# Patient Record
Sex: FEMALE | Race: WHITE | NOT HISPANIC OR LATINO | Employment: OTHER | URBAN - METROPOLITAN AREA
[De-identification: names, ages, dates, MRNs, and addresses within clinical notes are randomized per-mention and may not be internally consistent; named-entity substitution may affect disease eponyms.]

---

## 2017-01-17 ENCOUNTER — LAB VISIT (OUTPATIENT)
Dept: LAB | Facility: HOSPITAL | Age: 72
End: 2017-01-17
Attending: INTERNAL MEDICINE
Payer: MEDICARE

## 2017-01-17 DIAGNOSIS — E78.5 DYSLIPIDEMIA: ICD-10-CM

## 2017-01-17 LAB
CHOLEST/HDLC SERPL: 4.8 {RATIO}
HDL/CHOLESTEROL RATIO: 20.6 %
HDLC SERPL-MCNC: 218 MG/DL
HDLC SERPL-MCNC: 45 MG/DL
LDLC SERPL CALC-MCNC: 116.6 MG/DL
NONHDLC SERPL-MCNC: 173 MG/DL
TRIGL SERPL-MCNC: 282 MG/DL

## 2017-01-17 PROCEDURE — 80061 LIPID PANEL: CPT

## 2017-01-17 PROCEDURE — 36415 COLL VENOUS BLD VENIPUNCTURE: CPT | Mod: PO

## 2017-01-25 ENCOUNTER — OFFICE VISIT (OUTPATIENT)
Dept: NEUROLOGY | Facility: CLINIC | Age: 72
End: 2017-01-25
Payer: MEDICARE

## 2017-01-25 VITALS
SYSTOLIC BLOOD PRESSURE: 151 MMHG | HEIGHT: 65 IN | BODY MASS INDEX: 25.56 KG/M2 | WEIGHT: 153.44 LBS | DIASTOLIC BLOOD PRESSURE: 66 MMHG | HEART RATE: 68 BPM

## 2017-01-25 DIAGNOSIS — R51.9 OCCIPITAL PAIN: Primary | ICD-10-CM

## 2017-01-25 DIAGNOSIS — Z86.59 HISTORY OF ANXIETY: ICD-10-CM

## 2017-01-25 DIAGNOSIS — R03.0 TRANSIENT ELEVATED BLOOD PRESSURE: ICD-10-CM

## 2017-01-25 PROCEDURE — 99214 OFFICE O/P EST MOD 30 MIN: CPT | Mod: PBBFAC | Performed by: PHYSICIAN ASSISTANT

## 2017-01-25 PROCEDURE — 99999 PR PBB SHADOW E&M-EST. PATIENT-LVL IV: CPT | Mod: PBBFAC,,, | Performed by: PHYSICIAN ASSISTANT

## 2017-01-25 PROCEDURE — 99213 OFFICE O/P EST LOW 20 MIN: CPT | Mod: S$PBB,,, | Performed by: PHYSICIAN ASSISTANT

## 2017-01-25 RX ORDER — CYCLOBENZAPRINE HCL 10 MG
TABLET ORAL
Refills: 1 | COMMUNITY
Start: 2016-11-18 | End: 2017-02-07

## 2017-01-25 RX ORDER — PROPRANOLOL HYDROCHLORIDE 10 MG/1
TABLET ORAL
Qty: 60 TABLET | Refills: 3 | Status: SHIPPED | OUTPATIENT
Start: 2017-01-25 | End: 2017-11-16 | Stop reason: ALTCHOICE

## 2017-01-25 NOTE — PROGRESS NOTES
"[facility], Department of Neurology   [facility] [place] [address]  Phone:[phone]  Fax: [phone]      Established Patient       Patient Name: Elizabeth Gleason  : 1945  MRN:  [id number]    2017  Hector Oviedo MPA, PA-C    PCP:  COLEMAN Reyes MD  Date of Last Visit: 2016    CC: Follow-up      IMPRESSION:       ICD-10-CM ICD-9-CM   1. Occipital pain R51 784.0   2. History of anxiety Z86.59 V11.8   3. Transient elevated blood pressure R03.0 796.2   4. Hx of DM and osteoporosis (will not write steroids)   ---------in addition to above--------  Was doing well until recently, back under stress (flat tired, moved, etc). Comes and goes.   PLAN:   Will have her restart inderal daily, at noon (most of her "HA" occur mid day), she can try PRN in am or evening.     Reassured her that her exam was unchanged and essentially normal, if concern/changes, I am happy to get updated imaging, if the above treatment fails    She never got the PT I ordered at last visit, could consider another order for this in the future.     Follow up with PCP for other issues (BP, DM, Thyroid).     Review:   Orders Placed This Encounter    propranolol (INDERAL) 10 MG tablet     No orders of the defined types were placed in this encounter.      The patient is to continue to follow with the PCP for all other health conditions.    The patient indicates understanding of these issues and agrees to the plan    All current medications, test results as well as any necessary instructions were discussed with Elizabeth Gleason. Side effects of medications were explained. The patient indicates understanding of these issues and agrees to the plan and/or medication (s) discussed.      Follow Up PRN, I will be available for a while longer but will be departing the practice late Spring. My colleagues will be available if she needs to be re-examined. She voiced agreement.   INTERVAL HISTORY:  " "    History of Present Illness:  Elizabeth Gleason is a 71 y.o. right handed, female. She presents alone for HA. She  states:    States HA have come and gone. "When I take the inderal, I feel better, then I stop it, then I get a headache. I then take my BP, and its high, and that makes me nervous, and the headache gets worse, then my BP goes up..."   States daily  HA for past week, L occipitalis, no vision loss, weakness, sensory changes, falls, confusion, eye pain. Pain lasts a few hours. "I am sure its the anxiety, but I want to make sure nothing else is wrong." When asked, "if you were on the medicine on a regular basis, would you have the headaches (referencing the inderal)?" she replied "well, no."     Never got the PT ordered at last visit, "when I took the medicine, I was fine." States she is out of inderal.     As an aside, states her BP has been mildly elevated, her FBS has been "okay" during these episodes, and will be following up with her PCP in the near future.     States she is 5/10 pain in office, though smiling, lights on, doesn't appear in distress.     Imaging on file: no recent brain imaging   Therapies tried in past:  excedrin  advil   Aleve  Flexeril   Inderal     Medication reviewed and documented below:  Current Outpatient Prescriptions   Medication Sig Dispense Refill    ascorbic acid (VITAMIN C) 250 MG tablet Take 250 mg by mouth once daily.      aspirin (ECOTRIN) 81 MG EC tablet Take 81 mg by mouth once daily.      blood sugar diagnostic Strp 1 freestyle lite test strip weekly 50 strip 11    calcium-vitamin D 500-125 mg-unit tablet Take 1 tablet by mouth 2 (two) times daily.        colestipol (COLESTID) 1 gram Tab Take 1 tablet (1 g total) by mouth 2 (two) times daily. 180 tablet 3    cranberry 400 mg Cap Take by mouth.      cyclobenzaprine (FLEXERIL) 10 MG tablet TAKE 1 TABLET BY MOUTH 3 (THREE) TIMES DAILY AS NEEDED FOR MUSCLE SPASMS.  1    diazePAM (VALIUM) 5 MG " tablet Take 1 tablet (5 mg total) by mouth every 12 (twelve) hours as needed. 60 tablet 1    fexofenadine (ALLEGRA) 180 MG tablet Take 1 tablet (180 mg total) by mouth once daily. 90 tablet 3    fish oil-omega-3 fatty acids 300-1,000 mg capsule Take 2 g by mouth once daily.      fluticasone (FLONASE) 50 mcg/actuation nasal spray 2 sprays by Each Nare route once daily. 16 g 3    hydrocortisone (ANUSOL-HC) 25 mg suppository PLACE 1 SUPPOSITORY (25 MG TOTAL) RECTALLY 2 (TWO) TIMES DAILY AS NEEDED FOR HEMORRHOIDS. 24 suppository 0    levothyroxine (SYNTHROID) 88 MCG tablet Take 1 tablet (88 mcg total) by mouth once daily. 90 tablet 3    lisinopril-hydrochlorothiazide (PRINZIDE,ZESTORETIC) 20-12.5 mg per tablet Take 1 tablet by mouth once daily. 90 tablet 3    neomycin-polymyxin-hydrocortisone (CORTISPORIN) otic solution INSTILL 4 DROPS FOUR TIMES DAILY IN AFFECTED EAR 10 mL 1    omeprazole (PRILOSEC) 20 MG capsule Take 1 capsule (20 mg total) by mouth once daily. 90 capsule 3    ondansetron (ZOFRAN) 4 MG tablet Take 4 mg by mouth. 1-2 po q 4 hrs prn nausea.      promethazine (PHENERGAN) 25 MG tablet Take 1 tablet (25 mg total) by mouth every 6 (six) hours as needed for Nausea. 20 tablet 1    propranolol (INDERAL) 10 MG tablet 1 pill up to 3x a day as needed for stress/headache 60 tablet 3    rosuvastatin (CRESTOR) 5 MG tablet Take 1 tablet (5 mg total) by mouth once daily. 1 po qod 90 tablet 3    UBIDECARENONE (CO Q-10 ORAL) Take by mouth.      amoxicillin (AMOXIL) 500 MG capsule Take 500 mg by mouth 3 (three) times daily.  0     No current facility-administered medications for this visit.      Review of patient's allergies indicates:   Allergen Reactions    Levaquin [levofloxacin]      Heart raced and felt faint    Tramadol Nausea And Vomiting       PMHx: hearing loss Right ear  Past Medical History   Diagnosis Date    ALLERGIC RHINITIS     Carotid artery narrowing      rt worse than the left     "HEARING LOSS     Hyperlipidemia     Hypertension     Hypothyroid     Impaired fasting glucose     Menopause present     Migraine headache     Osteopenia     Tuberculosis      childhood TB       Fhx:  Family History   Problem Relation Age of Onset    Heart disease Mother      cabg    Kidney disease Mother      was on dialysis    Stroke Father     Cancer Brother      lung cancer    Breast cancer Neg Hx     Ovarian cancer Neg Hx     Colon cancer Maternal Aunt      SHx:no tobacco, occasional glass of wine, 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          Since Last visit, no further changes in PMHx, FHx, SHx if not already listed above.      Review of Testing:  Reviewed in chart, nothing to add to today's visit     ROS:   Review Of Systems (questions asked, positive or additions in BOLD)  Gen: Weakness    HEENT: Tinnitus, headache, blurred vision, eye pain, diplopia, photophobia,  nose bleeds   Card: Palpitations, CP   Pulm: SOB, cough         GI: N/V/D/C, incontinence, hematemesis, hematochezia    : incontinence, hematuria         M/S: Neck pain, falls    Neuro: PER HPI   PSY: Memory loss, confusion, depression, anxiety, trouble in sleep             PHYSICAL EXAM:      Visit Vitals    BP (!) 151/66    Pulse 68    Ht 5' 5" (1.651 m)    Wt 69.6 kg (153 lb 7 oz)    BMI 25.53 kg/m2      GEN: NAD, lights on in exam room   HEENT: NC/AT, " frontalis/temporalis/occipitalis/trapzeius NTTP  CVS: RRR, no MRG  MS: kyphotic at baseline     NEURO:  Mental Status: Awake, alert and appropriately oriented to time, place, and person. Speech is fluent and appropriate language function. Anxious, wringing hands during visit, has to redirected at times.      Cranial Nerves: Extraocular movements are intact and without nystagmus. Visual fields are full to confrontation testing Facial movement is symmetric. Facial sensation is intact. Hearing is normal. Uvula in Shoulder shrug symmetrical. T      Motor:   RUE:appropriate against gravity and medium force as tested 5/5  LUE: appropriate against gravity and medium force as tested 5/5  RLE:appropriate against gravity and medium force as tested 5/5  LLE: appropriate against gravity and medium force as tested 5/5  No resting tremor      Sensory:  RUE  intact light touch, proprioception and temperature  LUE intact light touch, proprioception and temperature     RLE intact light touch  LLE intact light touch        DTR's: 2+ bilat patellar      Gait and Stance: sit to stand and ambulates without assistance     CC:  P Parth Reyes MD      This document has been electronically signed by Hector Oviedo MPA, PA-C on 1/25/2017, 11:51 AM.  I have personally typed this message using the EMR.      Attending, Dr. Kiesha MD was available during today's encounter. Any change to plan along with cosign to appear in the EMR.

## 2017-01-25 NOTE — LETTER
January 25, 2017      CHAPINCITO Reyes MD  [address] [facility] [place] [address]           [facility]  [address]  [place] [address]  Phone: [phone]  Fax: [phone]          Patient: Elizabeth Gleason   MR Number: [id number]   YOB: 1945   Date of Visit: 1/25/2017       Dear Dr. CHAPINCITO Reyes:    Thank you for referring Elizabeth Gleason to me for evaluation. Attached you will find relevant portions of my assessment and plan of care.    If you have questions, please do not hesitate to call me. I look forward to following Elizabeth Gleason along with you.    Sincerely,    Hector Oviedo PA-C    Enclosure  CC:  No Recipients    If you would like to receive this communication electronically, please contact [email or web address] or [phone] to request more information on [employer] Link access.    For providers and/or their staff who would like to refer a patient to [facility], please contact us through our one-stop-shop provider referral line, [facility], at [phone].    If you feel you have received this communication in error or would no longer like to receive these types of communications, please e-mail [email or web address]

## 2017-01-31 ENCOUNTER — OFFICE VISIT (OUTPATIENT)
Dept: INTERNAL MEDICINE | Facility: CLINIC | Age: 72
End: 2017-01-31
Payer: MEDICARE

## 2017-01-31 VITALS
DIASTOLIC BLOOD PRESSURE: 78 MMHG | RESPIRATION RATE: 16 BRPM | BODY MASS INDEX: 25.2 KG/M2 | TEMPERATURE: 98 F | HEART RATE: 60 BPM | HEIGHT: 65 IN | SYSTOLIC BLOOD PRESSURE: 140 MMHG | WEIGHT: 151.25 LBS

## 2017-01-31 DIAGNOSIS — I10 ESSENTIAL HYPERTENSION: Primary | ICD-10-CM

## 2017-01-31 DIAGNOSIS — E78.5 HYPERLIPIDEMIA, UNSPECIFIED HYPERLIPIDEMIA TYPE: ICD-10-CM

## 2017-01-31 DIAGNOSIS — K64.4 EXTERNAL HEMORRHOID: ICD-10-CM

## 2017-01-31 PROCEDURE — 99213 OFFICE O/P EST LOW 20 MIN: CPT | Mod: PBBFAC,PO | Performed by: INTERNAL MEDICINE

## 2017-01-31 PROCEDURE — 99214 OFFICE O/P EST MOD 30 MIN: CPT | Mod: S$PBB,,, | Performed by: INTERNAL MEDICINE

## 2017-01-31 PROCEDURE — 99999 PR PBB SHADOW E&M-EST. PATIENT-LVL III: CPT | Mod: PBBFAC,,, | Performed by: INTERNAL MEDICINE

## 2017-01-31 NOTE — MR AVS SNAPSHOT
[facility]   [facility]  [place] [address]  Phone: [phone]  Fax: [phone]                  Elizabeth Gleason   2017 11:40 AM   Office Visit    Description:  Female : 1945   Provider:  CHAPINCITO Reyes MD   Department:  [facility]           Reason for Visit     Follow-up           Diagnoses this Visit        Comments    Essential hypertension    -  Primary     External hemorrhoid         Hyperlipidemia, unspecified hyperlipidemia type                To Do List           Future Appointments        Provider Department Dept Phone    2017 1:15 PM Kristyn Castro MD [facility]/ -[phone]      Goals (5 Years of Data)     None      Follow-Up and Disposition     Return in about 6 months (around 2017).       These Medications        Disp Refills Start End    rosuvastatin (CRESTOR) 5 MG tablet 90 tablet 3 2017    Take 1 tablet (5 mg total) by mouth once daily. - Oral    Pharmacy: [facility] #[id number] - [place] - [address]  #: [phone]         [facility]     [facility] Nurse Care [facility] -  Assistance  Registered nurses in the [facility] provide clinical advisement, health education, appointment booking, and other advisory services.  Call for this free service at [phone].             Medications           Message regarding Medications     Verify the changes and/or additions to your medication regime listed below are the same as discussed with your clinician today.  If any of these changes or additions are incorrect, please notify your healthcare provider.        START taking these NEW medications        Refills    rosuvastatin (CRESTOR) 5 MG tablet 3    Sig: Take 1 tablet (5 mg total) by mouth once daily.    Class: No Print    Route: Oral           Verify that the below list of medications is an accurate representation of the medications you are currently  taking.  If none reported, the list may be blank. If incorrect, please contact your healthcare provider. Carry this list with you in case of emergency.           Current Medications     amoxicillin (AMOXIL) 500 MG capsule Take 500 mg by mouth 3 (three) times daily.    ascorbic acid (VITAMIN C) 250 MG tablet Take 250 mg by mouth once daily.    aspirin (ECOTRIN) 81 MG EC tablet Take 81 mg by mouth once daily.    blood sugar diagnostic Strp 1 freestyle lite test strip weekly    calcium-vitamin D 500-125 mg-unit tablet Take 1 tablet by mouth 2 (two) times daily.      colestipol (COLESTID) 1 gram Tab Take 1 tablet (1 g total) by mouth 2 (two) times daily.    cranberry 400 mg Cap Take by mouth.    cyclobenzaprine (FLEXERIL) 10 MG tablet TAKE 1 TABLET BY MOUTH 3 (THREE) TIMES DAILY AS NEEDED FOR MUSCLE SPASMS.    diazePAM (VALIUM) 5 MG tablet Take 1 tablet (5 mg total) by mouth every 12 (twelve) hours as needed.    fexofenadine (ALLEGRA) 180 MG tablet Take 1 tablet (180 mg total) by mouth once daily.    fish oil-omega-3 fatty acids 300-1,000 mg capsule Take 2 g by mouth once daily.    fluticasone (FLONASE) 50 mcg/actuation nasal spray 2 sprays by Each Nare route once daily.    hydrocortisone (ANUSOL-HC) 25 mg suppository PLACE 1 SUPPOSITORY (25 MG TOTAL) RECTALLY 2 (TWO) TIMES DAILY AS NEEDED FOR HEMORRHOIDS.    levothyroxine (SYNTHROID) 88 MCG tablet Take 1 tablet (88 mcg total) by mouth once daily.    lisinopril-hydrochlorothiazide (PRINZIDE,ZESTORETIC) 20-12.5 mg per tablet Take 1 tablet by mouth once daily.    neomycin-polymyxin-hydrocortisone (CORTISPORIN) otic solution INSTILL 4 DROPS FOUR TIMES DAILY IN AFFECTED EAR    omeprazole (PRILOSEC) 20 MG capsule Take 1 capsule (20 mg total) by mouth once daily.    ondansetron (ZOFRAN) 4 MG tablet Take 4 mg by mouth. 1-2 po q 4 hrs prn nausea.    promethazine (PHENERGAN) 25 MG tablet Take 1 tablet (25 mg total) by mouth every 6 (six) hours as needed for Nausea.     "propranolol (INDERAL) 10 MG tablet 1 pill up to 3x a day as needed for stress/headache    rosuvastatin (CRESTOR) 5 MG tablet Take 1 tablet (5 mg total) by mouth once daily.    UBIDECARENONE (CO Q-10 ORAL) Take by mouth.           Clinical Reference Information           Vital Signs - Last Recorded  Most recent update: 1/31/2017 11:59 AM by Mable Levy LPN    BP Pulse Temp Resp Ht Wt    (!) 140/78 (BP Location: Left arm, Patient Position: Sitting, BP Method: Manual) 60 98 °F (36.7 °C) (Oral) 16 5' 5" (1.651 m) 68.6 kg (151 lb 3.8 oz)    BMI                25.17 kg/m2          Blood Pressure          Most Recent Value    BP  (!)  140/78      Allergies as of 1/31/2017     Levaquin [Levofloxacin]    Tramadol      Immunizations Administered on Date of Encounter - 1/31/2017     None      "

## 2017-02-02 RX ORDER — ROSUVASTATIN CALCIUM 5 MG/1
5 TABLET, COATED ORAL DAILY
Qty: 90 TABLET | Refills: 3
Start: 2017-02-02 | End: 2017-11-16 | Stop reason: ALTCHOICE

## 2017-02-02 NOTE — PROGRESS NOTES
This office note has been dictated.  HISTORY:  This is a 71-year-old female following up on hypertension and other   issues.  She also has hypothyroidism, dyslipidemia.  We are executing a   three-month follow up today to recheck her blood pressure.  She has brought her   home blood pressure monitor with her today.  She reports her home blood pressure   readings are in the normal range.  She has noticed a small lump around her anus   recently.  No pain.  No bleeding.  No change in bowel habits or other.  No   history of hemorrhoids.    CURRENT MEDICATIONS:  Noted and reviewed and updated in the electronic medical   record medication list.  Of note, she is now tolerating Crestor 5 mg daily.    REVIEW OF SYSTEMS:  CONSTITUTIONAL:  No fever, no chills, no generalized body aches.  HEENT:  No hoarseness, no dysphagia, no earache, no hearing issues.  CARDIOVASCULAR:  No chest pain, palpitations or syncope.  RESPIRATORY:  No cough or shortness of breath.  GASTROINTESTINAL:  No nausea, vomiting, abdominal pain, diarrhea, change in   bowel habits.  See HPI.    PAST MEDICAL HISTORY, PAST SURGICAL HISTORY, FAMILY AND SOCIAL HISTORY:  All   noted and reviewed in the electronic medical record history sections.    PHYSICAL EXAMINATION:  GENERAL:  Pleasant, alert, appropriately groomed lady in no acute distress.  VITAL SIGNS:  All noted and reviewed.  We took the patient's blood pressure   readings today in the office with her home monitor and this rendered readings of   162/89 and 158/80.  HEENT:  Normocephalic.  NECK:  Supple, no masses, no thyromegaly.  LUNGS:  Clear to auscultation and normal to percussion.  CARDIOVASCULAR:  Regular rate and rhythm.  No significant murmur.  Carotids are   full bilaterally without bruits.  No peripheral extremity edema.  GASTROINTESTINAL:  The abdomen is soft, benign.  No masses, no tenderness or   organomegaly.  RECTAL:  There is a small 5-mm external hemorrhoid on the right side, nontender.     No bleeding.  Digital unremarkable.    DATA:  Recent lab data noted regarding lipid profile.    IMPRESSION:  1.  Hypertension, probably reasonably controlled based on the patient's home   readings.  2.  External hemorrhoid, asymptomatic.  3.  Hypothyroidism, on replacement therapy.  4.  Dyslipidemia, on pharmacologic therapy.    PLAN:  1.  Continue current medical regimen.  Continue home monitoring of blood   pressure and advise if consistently outside the normal readings, which are   discussed.  Discussed over-the-counter treatment for the hemorrhoid if needed.    Advise if becomes symptomatic, changes or other.  2.  Return to clinic in six months.      LISETTE/ANNABELLA  dd: 02/02/2017 07:23:49 (CST)  td: 02/02/2017 11:46:12 (CST)  Doc ID   #[id number]  Job ID #[id number]    CC:

## 2017-02-07 ENCOUNTER — OFFICE VISIT (OUTPATIENT)
Dept: OBSTETRICS AND GYNECOLOGY | Facility: CLINIC | Age: 72
End: 2017-02-07
Payer: MEDICARE

## 2017-02-07 VITALS
HEIGHT: 65 IN | SYSTOLIC BLOOD PRESSURE: 122 MMHG | DIASTOLIC BLOOD PRESSURE: 60 MMHG | WEIGHT: 150.56 LBS | BODY MASS INDEX: 25.08 KG/M2

## 2017-02-07 DIAGNOSIS — K64.9 HEMORRHOIDS, UNSPECIFIED HEMORRHOID TYPE: ICD-10-CM

## 2017-02-07 DIAGNOSIS — Z01.419 ENCOUNTER FOR GYNECOLOGICAL EXAMINATION WITHOUT ABNORMAL FINDING: Primary | ICD-10-CM

## 2017-02-07 DIAGNOSIS — Z78.0 POSTMENOPAUSAL: ICD-10-CM

## 2017-02-07 DIAGNOSIS — N95.2 VAGINAL ATROPHY: ICD-10-CM

## 2017-02-07 PROCEDURE — 99999 PR PBB SHADOW E&M-EST. PATIENT-LVL III: CPT | Mod: PBBFAC,,, | Performed by: OBSTETRICS & GYNECOLOGY

## 2017-02-07 PROCEDURE — 99213 OFFICE O/P EST LOW 20 MIN: CPT | Mod: PBBFAC,PO | Performed by: OBSTETRICS & GYNECOLOGY

## 2017-02-07 PROCEDURE — G0101 CA SCREEN;PELVIC/BREAST EXAM: HCPCS | Mod: S$PBB,,, | Performed by: OBSTETRICS & GYNECOLOGY

## 2017-02-07 RX ORDER — HYDROCORTISONE ACETATE PRAMOXINE HCL 2.5; 1 G/100G; G/100G
CREAM TOPICAL 3 TIMES DAILY
Qty: 1 TUBE | Refills: 3 | Status: SHIPPED | OUTPATIENT
Start: 2017-02-07 | End: 2017-11-16 | Stop reason: ALTCHOICE

## 2017-02-07 NOTE — PROGRESS NOTES
"CC: Well woman exam    Elizabeth Gleason is a 71 y.o. female  presents for a well woman exam.  LMP: No LMP recorded. Patient is postmenopausal..    Has a hemorrhoid. It is bothersome for the patient.Using OTC products. NO sexually active.    Past Medical History   Diagnosis Date    ALLERGIC RHINITIS     Carotid artery narrowing      rt worse than the left    HEARING LOSS     Hyperlipidemia     Hypertension     Hypothyroid     Impaired fasting glucose     Menopause present     Migraine headache     Osteopenia     Tuberculosis      childhood TB     Past Surgical History   Procedure Laterality Date    Rt lung lobectomy and a rib removed       secondary to TB    Rt ear surgery      Tubal ligation      Left 5th toe surgery       Social History     Social History    Marital status:      Spouse name: N/A    Number of children: N/A    Years of education: N/A     Occupational History    retired teller      Social History Main Topics    Smoking status: Never Smoker    Smokeless tobacco: Never Used    Alcohol use 0.0 oz/week     0 Standard drinks or equivalent per week      Comment: occasionally    Drug use: No    Sexual activity: No     Other Topics Concern    None     Social History Narrative    She exercises regularly.     Family History   Problem Relation Age of Onset    Heart disease Mother      cabg    Kidney disease Mother      was on dialysis    Stroke Father     Cancer Brother      lung cancer    Colon cancer Maternal Aunt     Breast cancer Cousin      paternal FIRST cousin    Ovarian cancer Neg Hx      OB History      Para Term  AB TAB SAB Ectopic Multiple Living    1 1                  Visit Vitals    /60    Ht 5' 5" (1.651 m)    Wt 68.3 kg (150 lb 9.2 oz)    BMI 25.06 kg/m2         ROS:    ROS:  GENERAL: Denies weight gain or weight loss. Feeling well overall.   SKIN: Denies rash or lesions.   HEAD: Denies head injury or headache.   NODES: " Denies enlarged lymph nodes.   CHEST: Denies chest pain or shortness of breath.   CARDIOVASCULAR: Denies palpitations or left sided chest pain.   ABDOMEN: No abdominal pain, constipation, diarrhea, nausea, vomiting or rectal bleeding.   URINARY: No frequency, dysuria, hematuria, or burning on urination.  REPRODUCTIVE: See HPI.   BREASTS: The patient performs breast self-examination and denies pain, lumps, or nipple discharge.   HEMATOLOGIC: No easy bruisability or excessive bleeding.   MUSCULOSKELETAL: Denies joint pain or swelling.   NEUROLOGIC: Denies syncope or weakness.   PSYCHIATRIC: Denies depression, anxiety or mood swings.    PHYSICAL EXAM:    APPEARANCE: Well nourished, well developed, in no acute distress.  AFFECT: WNL, alert and oriented x 3  SKIN: No acne or hirsutism  NECK: Neck symmetric without masses or thyromegaly  NODES: No inguinal, cervical, axillary, or femoral lymph node enlargement  CHEST: Good respiratory effect  ABDOMEN: Soft.  No tenderness or masses.  No hepatosplenomegaly.  No hernias.  BREASTS: Symmetrical, no skin changes or visible lesions.  No palpable masses, nipple discharge bilaterally.  PELVIC: Normal external genitalia without lesions.  Normal hair distribution.  Adequate perineal body, normal urethral meatus.  Vagina moist and well rugated without lesions or discharge.  Cervix pink, without lesions, discharge or tenderness.  No significant cystocele or rectocele.  Bimanual exam shows uterus to be normal size, regular, mobile and nontender.  Adnexa without masses or tenderness.    RECTAL: Rectovaginal exam confirms above with normal sphincter tone, no masses.  EXTREMITIES: No edema.      ICD-10-CM ICD-9-CM    1. Encounter for gynecological examination without abnormal finding Z01.419 V72.31    2. Postmenopausal Z78.0 V49.81    3. Vaginal atrophy N95.2 627.3    4. Hemorrhoids, unspecified hemorrhoid type K64.9 455.6 hydrocortisone-pramoxine (ANALPRAM-HC) 2.5-1 % Crea          Patient was counseled today on A.C.S. Pap guidelines and recommendations for yearly pelvic exams, mammograms and monthly self breast exams; to see her PCP for other health maintenance.     F/U PRN

## 2017-02-07 NOTE — MR AVS SNAPSHOT
[facility]   [facility]  [place] [address]  Phone: [phone]                  Elizabeth Gleason   2017 1:15 PM   Office Visit    Description:  Female : 1945   Provider:  Kristyn Castro MD   Department:  [facility]           Reason for Visit     Well Woman                To Do List           Future Appointments        Provider Department Dept Phone    2017 1:15 PM Kristyn Castro MD [facility]/ -[phone]      Goals (5 Years of Data)     None      [facility]     [facility] -  Assistance  Registered nurses in the [facility] provide clinical advisement, health education, appointment booking, and other advisory services.  Call for this free service at [phone].             Medications           Message regarding Medications     Verify the changes and/or additions to your medication regime listed below are the same as discussed with your clinician today.  If any of these changes or additions are incorrect, please notify your healthcare provider.        STOP taking these medications     amoxicillin (AMOXIL) 500 MG capsule Take 500 mg by mouth 3 (three) times daily.    cyclobenzaprine (FLEXERIL) 10 MG tablet TAKE 1 TABLET BY MOUTH 3 (THREE) TIMES DAILY AS NEEDED FOR MUSCLE SPASMS.    hydrocortisone (ANUSOL-HC) 25 mg suppository PLACE 1 SUPPOSITORY (25 MG TOTAL) RECTALLY 2 (TWO) TIMES DAILY AS NEEDED FOR HEMORRHOIDS.    ondansetron (ZOFRAN) 4 MG tablet Take 4 mg by mouth. 1-2 po q 4 hrs prn nausea.    promethazine (PHENERGAN) 25 MG tablet Take 1 tablet (25 mg total) by mouth every 6 (six) hours as needed for Nausea.           Verify that the below list of medications is an accurate representation of the medications you are currently taking.  If none reported, the list may be blank. If incorrect, please contact your healthcare provider. Carry this list with you in case of emergency.           Current  "Medications     shark liver oil-cocoa butter 0.25-3% (PREPARATION H) 0.25-3 % suppository Place 1 suppository rectally as needed for Hemorrhoids.    ascorbic acid (VITAMIN C) 250 MG tablet Take 250 mg by mouth once daily.    aspirin (ECOTRIN) 81 MG EC tablet Take 81 mg by mouth once daily.    blood sugar diagnostic Strp 1 freestyle lite test strip weekly    calcium-vitamin D 500-125 mg-unit tablet Take 1 tablet by mouth 2 (two) times daily.      colestipol (COLESTID) 1 gram Tab Take 1 tablet (1 g total) by mouth 2 (two) times daily.    cranberry 400 mg Cap Take by mouth.    diazePAM (VALIUM) 5 MG tablet Take 1 tablet (5 mg total) by mouth every 12 (twelve) hours as needed.    fexofenadine (ALLEGRA) 180 MG tablet Take 1 tablet (180 mg total) by mouth once daily.    fish oil-omega-3 fatty acids 300-1,000 mg capsule Take 2 g by mouth once daily.    fluticasone (FLONASE) 50 mcg/actuation nasal spray 2 sprays by Each Nare route once daily.    levothyroxine (SYNTHROID) 88 MCG tablet Take 1 tablet (88 mcg total) by mouth once daily.    lisinopril-hydrochlorothiazide (PRINZIDE,ZESTORETIC) 20-12.5 mg per tablet Take 1 tablet by mouth once daily.    neomycin-polymyxin-hydrocortisone (CORTISPORIN) otic solution INSTILL 4 DROPS FOUR TIMES DAILY IN AFFECTED EAR    omeprazole (PRILOSEC) 20 MG capsule Take 1 capsule (20 mg total) by mouth once daily.    propranolol (INDERAL) 10 MG tablet 1 pill up to 3x a day as needed for stress/headache    rosuvastatin (CRESTOR) 5 MG tablet Take 1 tablet (5 mg total) by mouth once daily.    UBIDECARENONE (CO Q-10 ORAL) Take by mouth.           Clinical Reference Information           Your Vitals Were     BP Height Weight BMI       122/60 5' 5" (1.651 m) 68.3 kg (150 lb 9.2 oz) 25.06 kg/m2       Blood Pressure          Most Recent Value    BP  122/60      Allergies as of 2/7/2017     Levaquin [Levofloxacin]    Tramadol      Immunizations Administered on Date of Encounter - 2/7/2017     None    "   Language Assistance Services     ATTENTION: Language assistance services are available, free of charge. Please call [phone].      ATENCIÓN: Si habla viviana, tiene a chapman disposición servicios gratuitos de asistencia lingüística. Llame al [phone].     CHÚ Ý: N?u b?n nói Ti?ng Vi?t, có các d?ch v? h? tr? ngôn ng? mi?n phí dành cho b?n. G?i s? [phone].         [facility] complies with applicable Federal civil rights laws and does not discriminate on the basis of race, color, national origin, age, disability, or sex.

## 2017-02-13 ENCOUNTER — TELEPHONE (OUTPATIENT)
Dept: NEUROLOGY | Facility: CLINIC | Age: 72
End: 2017-02-13

## 2017-02-13 DIAGNOSIS — Z01.812 PRE-PROCEDURE LAB EXAM: ICD-10-CM

## 2017-02-13 DIAGNOSIS — R51.9 WORSENING HEADACHES: Primary | ICD-10-CM

## 2017-02-13 NOTE — TELEPHONE ENCOUNTER
Pt states she stills having bad headaches  The medications that was prescribed is no longer working  for her shes requesting a MRI          -- Message from Isaiah Bray sent at 2/13/2017  2:47 PM CST -----  Contact: Self [phone]  or work # [phone]  Patient is returning a miss call, pls call

## 2017-02-13 NOTE — TELEPHONE ENCOUNTER
Left message and call back number         - Message from Khushboo Perez sent at 2/13/2017 11:24 AM CST -----  Contact: Patient [phone]   Patient is calling to speak with  about her head. Please call to discuss further

## 2017-02-14 ENCOUNTER — TELEPHONE (OUTPATIENT)
Dept: NEUROLOGY | Facility: CLINIC | Age: 72
End: 2017-02-14

## 2017-02-14 RX ORDER — HYDROXYZINE PAMOATE 25 MG/1
CAPSULE ORAL
Qty: 3 CAPSULE | Refills: 0 | Status: SHIPPED | OUTPATIENT
Start: 2017-02-14 | End: 2017-11-16 | Stop reason: ALTCHOICE

## 2017-02-14 NOTE — TELEPHONE ENCOUNTER
Called and left detailed message verbatim of instructions left by Yosvany Oviedo PA-C concerning her need to take something for her MRI due to her being claustrophobic.It was explained in no uncertain terms that regardless that she will need a ride to and from testing.Number left for her to call is she has any questions.

## 2017-02-14 NOTE — TELEPHONE ENCOUNTER
Called and spoke to Patient.Her MRI has been scheduled for 2/21 at 4:30.She states she will need sedation due to claustrophobia.She stated she will come in and have lab work drawn this week.She does understand that she will need someone to drive her for her MRI due to sedation.

## 2017-02-14 NOTE — TELEPHONE ENCOUNTER
If she has valium, she can use one of those from her other provider (I ask that she secure a ride, please document this...).    I do not sedate people for MRIs.    If she needs something to relax, and is out of valium I will write her vistaril. 1 pill 4 hours before MRI, second pill 30 mins before. She needs to secure a ride.  If she already has the valium still, DO NOT PICK THIS MEDICATION UP/DO NOT USE.  Please let her know, and document.     I sent the Rx to her [facility] [address].    MORENITA MOLINA  02/14/2017    Please document the above in the chart.

## 2017-02-14 NOTE — TELEPHONE ENCOUNTER
I have order a MRI of the brain +/- contrast. She will need to get pre procedure kidney lab (creatinine), which I have placed.     Staff: can you a) relay the above, b) schedule her for the MRI, c) document?      MORENITA MOLINA  02/14/2017

## 2017-02-15 NOTE — TELEPHONE ENCOUNTER
Called and informed Patient the reasoning why labs have been ordered for her MRI-to check kidney function due to the contrast.She verbalized understanding of above.

## 2017-02-16 ENCOUNTER — LAB VISIT (OUTPATIENT)
Dept: LAB | Facility: HOSPITAL | Age: 72
End: 2017-02-16
Attending: PSYCHIATRY & NEUROLOGY
Payer: MEDICARE

## 2017-02-16 DIAGNOSIS — Z01.812 PRE-PROCEDURE LAB EXAM: ICD-10-CM

## 2017-02-16 LAB
CREAT SERPL-MCNC: 0.8 MG/DL
EST. GFR  (AFRICAN AMERICAN): >60 ML/MIN/1.73 M^2
EST. GFR  (NON AFRICAN AMERICAN): >60 ML/MIN/1.73 M^2

## 2017-02-16 PROCEDURE — 36415 COLL VENOUS BLD VENIPUNCTURE: CPT | Mod: PO

## 2017-02-16 PROCEDURE — 82565 ASSAY OF CREATININE: CPT

## 2017-02-17 ENCOUNTER — TELEPHONE (OUTPATIENT)
Dept: NEUROLOGY | Facility: CLINIC | Age: 72
End: 2017-02-17

## 2017-02-17 NOTE — TELEPHONE ENCOUNTER
Please let her know/document that the labs are normal. I will await her imaging.    MORENITA MOLINA  02/17/2017

## 2017-02-17 NOTE — TELEPHONE ENCOUNTER
Called and left message for Patient.Informed her that per Yosvany Oviedo-her labs are normal and when imaging is available,we will contact her with results.

## 2017-02-21 ENCOUNTER — HOSPITAL ENCOUNTER (OUTPATIENT)
Dept: RADIOLOGY | Facility: HOSPITAL | Age: 72
Discharge: HOME | End: 2017-02-21
Attending: PSYCHIATRY & NEUROLOGY
Payer: MEDICARE

## 2017-02-21 DIAGNOSIS — R51.9 WORSENING HEADACHES: ICD-10-CM

## 2017-02-21 PROCEDURE — 25500020 PHARM REV CODE 255: Performed by: PSYCHIATRY & NEUROLOGY

## 2017-02-21 PROCEDURE — A9585 GADOBUTROL INJECTION: HCPCS | Performed by: PSYCHIATRY & NEUROLOGY

## 2017-02-21 PROCEDURE — 70553 MRI BRAIN STEM W/O & W/DYE: CPT | Mod: 26,,, | Performed by: RADIOLOGY

## 2017-02-21 PROCEDURE — 70553 MRI BRAIN STEM W/O & W/DYE: CPT | Mod: TC

## 2017-02-21 RX ORDER — GADOBUTROL 604.72 MG/ML
8 INJECTION INTRAVENOUS
Status: COMPLETED | OUTPATIENT
Start: 2017-02-21 | End: 2017-02-21

## 2017-02-21 RX ADMIN — GADOBUTROL 8 ML: 604.72 INJECTION INTRAVENOUS at 04:02

## 2017-02-22 ENCOUNTER — TELEPHONE (OUTPATIENT)
Dept: NEUROLOGY | Facility: CLINIC | Age: 72
End: 2017-02-22

## 2017-02-22 NOTE — TELEPHONE ENCOUNTER
Please let the patient know her brain doesn't show any new/acute abnormalities. Continue to take aspirin daily. Please document.    MORENITA MOLINA  02/22/2017

## 2017-02-22 NOTE — TELEPHONE ENCOUNTER
Called and left message for Patient informing her of results and to continue to take her aspirin.Number left for her if she has any questions.

## 2017-06-24 ENCOUNTER — HOSPITAL ENCOUNTER (EMERGENCY)
Facility: HOSPITAL | Age: 72
Discharge: HOME | End: 2017-06-24
Attending: EMERGENCY MEDICINE
Payer: MEDICARE

## 2017-06-24 VITALS
TEMPERATURE: 98 F | SYSTOLIC BLOOD PRESSURE: 135 MMHG | WEIGHT: 152 LBS | HEART RATE: 75 BPM | OXYGEN SATURATION: 98 % | RESPIRATION RATE: 16 BRPM | BODY MASS INDEX: 25.33 KG/M2 | HEIGHT: 65 IN | DIASTOLIC BLOOD PRESSURE: 63 MMHG

## 2017-06-24 DIAGNOSIS — N30.91 CYSTITIS WITH HEMATURIA: Primary | ICD-10-CM

## 2017-06-24 LAB
BACTERIA #/AREA URNS AUTO: ABNORMAL /HPF
BILIRUB UR QL STRIP: NEGATIVE
CLARITY UR REFRACT.AUTO: ABNORMAL
COLOR UR AUTO: ABNORMAL
GLUCOSE UR QL STRIP: ABNORMAL
HGB UR QL STRIP: ABNORMAL
HYALINE CASTS UR QL AUTO: 0 /LPF
KETONES UR QL STRIP: NEGATIVE
LEUKOCYTE ESTERASE UR QL STRIP: ABNORMAL
MICROSCOPIC COMMENT: ABNORMAL
NITRITE UR QL STRIP: NEGATIVE
PH UR STRIP: 6 [PH] (ref 5–8)
PROT UR QL STRIP: ABNORMAL
RBC #/AREA URNS AUTO: >100 /HPF (ref 0–4)
SP GR UR STRIP: 1.01 (ref 1–1.03)
SQUAMOUS #/AREA URNS AUTO: 7 /HPF
URN SPEC COLLECT METH UR: ABNORMAL
UROBILINOGEN UR STRIP-ACNC: NEGATIVE EU/DL
WBC #/AREA URNS AUTO: >100 /HPF (ref 0–5)
WBC CLUMPS UR QL AUTO: ABNORMAL

## 2017-06-24 PROCEDURE — 87186 SC STD MICRODIL/AGAR DIL: CPT

## 2017-06-24 PROCEDURE — 99284 EMERGENCY DEPT VISIT MOD MDM: CPT

## 2017-06-24 PROCEDURE — 81001 URINALYSIS AUTO W/SCOPE: CPT

## 2017-06-24 PROCEDURE — 25000003 PHARM REV CODE 250: Performed by: EMERGENCY MEDICINE

## 2017-06-24 PROCEDURE — 87088 URINE BACTERIA CULTURE: CPT

## 2017-06-24 PROCEDURE — 87086 URINE CULTURE/COLONY COUNT: CPT

## 2017-06-24 PROCEDURE — 87077 CULTURE AEROBIC IDENTIFY: CPT

## 2017-06-24 PROCEDURE — 99284 EMERGENCY DEPT VISIT MOD MDM: CPT | Mod: ,,, | Performed by: EMERGENCY MEDICINE

## 2017-06-24 PROCEDURE — 81003 URINALYSIS AUTO W/O SCOPE: CPT

## 2017-06-24 RX ORDER — CEPHALEXIN 500 MG/1
500 CAPSULE ORAL EVERY 6 HOURS
Qty: 28 CAPSULE | Refills: 0 | Status: SHIPPED | OUTPATIENT
Start: 2017-06-24 | End: 2017-07-01

## 2017-06-24 RX ORDER — ACETAMINOPHEN 325 MG/1
650 TABLET ORAL
Status: COMPLETED | OUTPATIENT
Start: 2017-06-24 | End: 2017-06-24

## 2017-06-24 RX ORDER — PHENAZOPYRIDINE HYDROCHLORIDE 100 MG/1
200 TABLET, FILM COATED ORAL EVERY 8 HOURS PRN
Qty: 9 TABLET | Refills: 0 | Status: SHIPPED | OUTPATIENT
Start: 2017-06-24 | End: 2017-06-26

## 2017-06-24 RX ORDER — PHENAZOPYRIDINE HYDROCHLORIDE 100 MG/1
100 TABLET, FILM COATED ORAL
Status: COMPLETED | OUTPATIENT
Start: 2017-06-24 | End: 2017-06-24

## 2017-06-24 RX ORDER — IBUPROFEN 400 MG/1
400 TABLET ORAL
Status: DISCONTINUED | OUTPATIENT
Start: 2017-06-24 | End: 2017-06-24

## 2017-06-24 RX ORDER — ACETAMINOPHEN 500 MG
500 TABLET ORAL EVERY 6 HOURS PRN
Qty: 20 TABLET | Refills: 0 | Status: SHIPPED | OUTPATIENT
Start: 2017-06-24 | End: 2017-06-26

## 2017-06-24 RX ADMIN — ACETAMINOPHEN 650 MG: 325 TABLET ORAL at 08:06

## 2017-06-24 RX ADMIN — PHENAZOPYRIDINE HYDROCHLORIDE 100 MG: 100 TABLET ORAL at 08:06

## 2017-06-24 NOTE — ED PROVIDER NOTES
Encounter Date: 6/24/2017       History     Chief Complaint   Patient presents with    Hematuria     since 3am    Urinary Tract Infection     burning while urinating     71 y.o. F with h/o HTN, HLD presents to ED with painful urination and blood in urine. Symptoms began about 3am when she went to the bathroom and noted a severe burning pain with urination. She has since developed bloody urine with a few small clumps. She also endorses a burning pain to the suprapubic area that began at the same time, is severe and constant. No radiation of pain. Symptoms aggravated with urination, no alleviating factors. Has had similar symptoms in the past when she was diagnosed with a UTI. Denies associated fever, back pain, nausea or vomiting. Has not taken anything for pain.           Review of patient's allergies indicates:   Allergen Reactions    Levaquin [levofloxacin]      Heart raced and felt faint    Tramadol Nausea And Vomiting     Past Medical History:   Diagnosis Date    ALLERGIC RHINITIS     Carotid artery narrowing     rt worse than the left    HEARING LOSS     Hyperlipidemia     Hypertension     Hypothyroid     Impaired fasting glucose     Menopause present     Migraine headache     Osteopenia     Tuberculosis     childhood TB     Past Surgical History:   Procedure Laterality Date    left 5th toe surgery      rt ear surgery      rt lung lobectomy and a rib removed      secondary to TB    TUBAL LIGATION       Family History   Problem Relation Age of Onset    Heart disease Mother      cabg    Kidney disease Mother      was on dialysis    Stroke Father     Cancer Brother      lung cancer    Colon cancer Maternal Aunt     Breast cancer Cousin      paternal FIRST cousin    Ovarian cancer Neg Hx      Social History   Substance Use Topics    Smoking status: Never Smoker    Smokeless tobacco: Never Used    Alcohol use 0.0 oz/week      Comment: occasionally     Review of Systems   Constitutional:  Negative for chills and fever.   HENT: Negative for congestion and sore throat.    Respiratory: Negative for cough and shortness of breath.    Cardiovascular: Negative for chest pain and leg swelling.   Gastrointestinal: Negative for nausea and vomiting.   Genitourinary: Positive for dysuria, hematuria, pelvic pain and urgency. Negative for flank pain.   Musculoskeletal: Negative for back pain and gait problem.   Skin: Negative for rash.   Neurological: Negative for weakness and numbness.   Hematological: Does not bruise/bleed easily.   All other systems reviewed and are negative.      Physical Exam     Initial Vitals [06/24/17 0655]   BP Pulse Resp Temp SpO2   (!) 191/84 104 20 98.5 °F (36.9 °C) 97 %      MAP       119.67         Physical Exam    Nursing note and vitals reviewed.  Constitutional: She appears well-developed and well-nourished. She is not diaphoretic.   Appears uncomfortable from pain but nontoxic   HENT:   Head: Normocephalic and atraumatic.   Eyes: Conjunctivae and EOM are normal. No scleral icterus.   Neck: Normal range of motion. Neck supple. No tracheal deviation present.   Cardiovascular: Normal rate, regular rhythm, normal heart sounds and intact distal pulses. Exam reveals no gallop and no friction rub.    No murmur heard.  Pulmonary/Chest: Breath sounds normal. No respiratory distress. She has no wheezes. She has no rhonchi. She has no rales.   Abdominal: Soft. Bowel sounds are normal. She exhibits no distension. There is no tenderness. There is no rebound and no guarding.   Abdomen soft, nontender. No CVA tenderness   Neurological: She is oriented to person, place, and time. She has normal strength. No cranial nerve deficit.   Skin: Skin is warm and dry.         ED Course   Procedures  Labs Reviewed   URINALYSIS, REFLEX TO URINE CULTURE   URINALYSIS MICROSCOPIC                   APC / Resident Notes:   71 y.o. F here with LUTS with hematuria, suprapubic pain.  Afebrile, well appearing, no  abdominal or CVA tenderness.  Suspect UTI vs. Early pyelo. Has been passing small amount of blood in urine but no symptoms of blood loss anemia.  UA with RBCs and WBCs with clumps, consistent with UTI with hematuria. UCx sent.  Treated here with tylenol and pyridium and pain significantly improved. Pt hypertensive upon arrival but BP significantly improved with pain control.  Will treat with keflex for 7 days, short course of pyridium and tylenol.  Return precautions discussed. To f/u with PCP as needed for reassessment.  Aliza Lopez MD  10:23 AM           Attending Attestation:   Physician Attestation Statement for Resident:  As the supervising MD   Physician Attestation Statement: I have personally seen and examined this patient.   I agree with the above history. -: 72 yo f, healthy, urinary sx, R flank pain.  No fever/chills.  No CVA tend on exam    UA c/w UTI vs pyelo  Will tx with keflex x 7 days, possible early pyelo  Safe for outpatient management   As the supervising MD I agree with the above PE.    As the supervising MD I agree with the above treatment, course, plan, and disposition.  I have reviewed and agree with the residents interpretation of the following: lab data.                    ED Course     Clinical Impression:   The encounter diagnosis was Cystitis with hematuria.                           Aliza Lopez MD  Resident  06/24/17 [id number]

## 2017-06-26 LAB — BACTERIA UR CULT: NORMAL

## 2017-06-27 ENCOUNTER — TELEPHONE (OUTPATIENT)
Dept: INTERNAL MEDICINE | Facility: CLINIC | Age: 72
End: 2017-06-27

## 2017-06-27 DIAGNOSIS — E11.9 DIABETES MELLITUS WITHOUT COMPLICATION: Primary | ICD-10-CM

## 2017-06-27 NOTE — TELEPHONE ENCOUNTER
----- Message from Michelle Edwards sent at 6/27/2017  7:58 AM CDT -----  Contact: Pt at [phone]  Doctor appointment and lab have been scheduled.  Please link lab orders to the lab appointment.  Date of doctor appointment:   8/15  Physical or EP:  ep  Date of lab appointment:  8/8  Comments:

## 2017-08-08 ENCOUNTER — LAB VISIT (OUTPATIENT)
Dept: LAB | Facility: HOSPITAL | Age: 72
End: 2017-08-08
Attending: INTERNAL MEDICINE
Payer: MEDICARE

## 2017-08-08 ENCOUNTER — TELEPHONE (OUTPATIENT)
Dept: ENDOSCOPY | Facility: HOSPITAL | Age: 72
End: 2017-08-08

## 2017-08-08 ENCOUNTER — OFFICE VISIT (OUTPATIENT)
Dept: SURGERY | Facility: CLINIC | Age: 72
End: 2017-08-08
Payer: MEDICARE

## 2017-08-08 VITALS
SYSTOLIC BLOOD PRESSURE: 140 MMHG | HEIGHT: 65 IN | DIASTOLIC BLOOD PRESSURE: 61 MMHG | WEIGHT: 151.69 LBS | HEART RATE: 68 BPM | BODY MASS INDEX: 25.27 KG/M2

## 2017-08-08 DIAGNOSIS — Z12.11 COLON CANCER SCREENING: ICD-10-CM

## 2017-08-08 DIAGNOSIS — R15.2 FECAL URGENCY: Primary | ICD-10-CM

## 2017-08-08 DIAGNOSIS — E11.9 DIABETES MELLITUS WITHOUT COMPLICATION: ICD-10-CM

## 2017-08-08 DIAGNOSIS — Z12.11 SPECIAL SCREENING FOR MALIGNANT NEOPLASMS, COLON: Primary | ICD-10-CM

## 2017-08-08 LAB
ANION GAP SERPL CALC-SCNC: 9 MMOL/L
BUN SERPL-MCNC: 16 MG/DL
CALCIUM SERPL-MCNC: 9.6 MG/DL
CHLORIDE SERPL-SCNC: 105 MMOL/L
CO2 SERPL-SCNC: 28 MMOL/L
CREAT SERPL-MCNC: 0.8 MG/DL
EST. GFR  (AFRICAN AMERICAN): >60 ML/MIN/1.73 M^2
EST. GFR  (NON AFRICAN AMERICAN): >60 ML/MIN/1.73 M^2
ESTIMATED AVG GLUCOSE: 105 MG/DL
GLUCOSE SERPL-MCNC: 106 MG/DL
HBA1C MFR BLD HPLC: 5.3 %
POTASSIUM SERPL-SCNC: 4.1 MMOL/L
SODIUM SERPL-SCNC: 142 MMOL/L

## 2017-08-08 PROCEDURE — 3008F BODY MASS INDEX DOCD: CPT | Mod: ,,, | Performed by: COLON & RECTAL SURGERY

## 2017-08-08 PROCEDURE — 99203 OFFICE O/P NEW LOW 30 MIN: CPT | Mod: 25,S$PBB,, | Performed by: COLON & RECTAL SURGERY

## 2017-08-08 PROCEDURE — 46600 DIAGNOSTIC ANOSCOPY SPX: CPT | Mod: PBBFAC | Performed by: COLON & RECTAL SURGERY

## 2017-08-08 PROCEDURE — 3077F SYST BP >= 140 MM HG: CPT | Mod: ,,, | Performed by: COLON & RECTAL SURGERY

## 2017-08-08 PROCEDURE — 99999 PR PBB SHADOW E&M-EST. PATIENT-LVL III: CPT | Mod: PBBFAC,,, | Performed by: COLON & RECTAL SURGERY

## 2017-08-08 PROCEDURE — 46600 DIAGNOSTIC ANOSCOPY SPX: CPT | Mod: S$PBB,,, | Performed by: COLON & RECTAL SURGERY

## 2017-08-08 PROCEDURE — 99213 OFFICE O/P EST LOW 20 MIN: CPT | Mod: PBBFAC | Performed by: COLON & RECTAL SURGERY

## 2017-08-08 PROCEDURE — 1159F MED LIST DOCD IN RCRD: CPT | Mod: ,,, | Performed by: COLON & RECTAL SURGERY

## 2017-08-08 PROCEDURE — 3078F DIAST BP <80 MM HG: CPT | Mod: ,,, | Performed by: COLON & RECTAL SURGERY

## 2017-08-08 RX ORDER — POLYETHYLENE GLYCOL 3350, SODIUM SULFATE ANHYDROUS, SODIUM BICARBONATE, SODIUM CHLORIDE, POTASSIUM CHLORIDE 236; 22.74; 6.74; 5.86; 2.97 G/4L; G/4L; G/4L; G/4L; G/4L
4 POWDER, FOR SOLUTION ORAL ONCE
Qty: 4000 ML | Refills: 0 | Status: SHIPPED | OUTPATIENT
Start: 2017-08-08 | End: 2017-08-08

## 2017-08-08 NOTE — PROGRESS NOTES
"Subjective:       Patient ID: Elizabeth Gleason is a 71 y.o. female.    Chief Complaint: Rectal discomfort    HPI  70 yo F with c/o rectal discomfort and fecal urgency.  Two years ago she had an episode of diverticulitis - underwent a CT with rectal contrast at that time, and she feels that ever since the catheter for rectal contrast was placed she's had problems.  C/o fecal urgency but no incontinence.  She says that "when she has to go, she has to go now."  C/o a "spastic colon." She's used Analpram for hemorrhoids in the past.  No pain of bleeding with BM's.      Last colonoscopy - approx 10 yrs ago at [facility]  No family hx of CRC or IBD.      Review of patient's allergies indicates:   Allergen Reactions    Levaquin [levofloxacin]      Heart raced and felt faint    Tramadol Nausea And Vomiting       Past Medical History:   Diagnosis Date    ALLERGIC RHINITIS     Carotid artery narrowing     rt worse than the left    HEARING LOSS     Hyperlipidemia     Hypertension     Hypothyroid     Impaired fasting glucose     Menopause present     Migraine headache     Osteopenia     Tuberculosis     childhood TB       Past Surgical History:   Procedure Laterality Date    left 5th toe surgery      rt ear surgery      rt lung lobectomy and a rib removed      secondary to TB    TUBAL LIGATION         Current Outpatient Prescriptions   Medication Sig Dispense Refill    ascorbic acid (VITAMIN C) 250 MG tablet Take 250 mg by mouth once daily.      aspirin (ECOTRIN) 81 MG EC tablet Take 81 mg by mouth once daily.      blood sugar diagnostic Strp 1 freestyle lite test strip weekly 50 strip 11    calcium-vitamin D 500-125 mg-unit tablet Take 1 tablet by mouth 2 (two) times daily.        colestipol (COLESTID) 1 gram Tab Take 1 tablet (1 g total) by mouth 2 (two) times daily. 180 tablet 3    cranberry 400 mg Cap Take by mouth.      fexofenadine (ALLEGRA) 180 MG tablet Take 1 tablet (180 mg total) by mouth once " daily. 90 tablet 3    fish oil-omega-3 fatty acids 300-1,000 mg capsule Take 2 g by mouth once daily.      fluticasone (FLONASE) 50 mcg/actuation nasal spray 2 sprays by Each Nare route once daily. 16 g 3    hydrocortisone-pramoxine (ANALPRAM-HC) 2.5-1 % Crea Place rectally 3 (three) times daily. 1 Tube 3    hydrOXYzine pamoate (VISTARIL) 25 MG Cap 1 pill 4 hours before MRI, second pill 30 mins before MRI. Do not drive, you will need to secure a ride 3 capsule 0    neomycin-polymyxin-hydrocortisone (CORTISPORIN) otic solution INSTILL 4 DROPS FOUR TIMES DAILY IN AFFECTED EAR 10 mL 1    omeprazole (PRILOSEC) 20 MG capsule Take 1 capsule (20 mg total) by mouth once daily. 90 capsule 3    propranolol (INDERAL) 10 MG tablet 1 pill up to 3x a day as needed for stress/headache 60 tablet 3    rosuvastatin (CRESTOR) 5 MG tablet Take 1 tablet (5 mg total) by mouth once daily. 90 tablet 3    shark liver oil-cocoa butter 0.25-3% (PREPARATION H) 0.25-3 % suppository Place 1 suppository rectally as needed for Hemorrhoids.      UBIDECARENONE (CO Q-10 ORAL) Take by mouth.      diazePAM (VALIUM) 5 MG tablet Take 1 tablet (5 mg total) by mouth every 12 (twelve) hours as needed. 60 tablet 1    levothyroxine (SYNTHROID) 88 MCG tablet Take 1 tablet (88 mcg total) by mouth once daily. 90 tablet 3    lisinopril-hydrochlorothiazide (PRINZIDE,ZESTORETIC) 20-12.5 mg per tablet Take 1 tablet by mouth once daily. 90 tablet 3     No current facility-administered medications for this visit.        Family History   Problem Relation Age of Onset    Heart disease Mother      cabg    Kidney disease Mother      was on dialysis    Stroke Father     Cancer Brother      lung cancer    Colon cancer Maternal Aunt     Breast cancer Cousin      paternal FIRST cousin    Ovarian cancer Neg Hx        Social History     Social History    Marital status:      Spouse name: N/A    Number of children: N/A    Years of education: N/A      Occupational History    retired teller      Social History Main Topics    Smoking status: Never Smoker    Smokeless tobacco: Never Used    Alcohol use 0.0 oz/week      Comment: occasionally    Drug use: No    Sexual activity: No     Other Topics Concern    None     Social History Narrative    She exercises regularly.       Review of Systems   Constitutional: Negative for chills and fever.   HENT: Positive for hearing loss. Negative for congestion and sore throat.    Eyes: Negative for visual disturbance.   Respiratory: Negative for cough and shortness of breath.    Cardiovascular: Negative for chest pain and palpitations.   Gastrointestinal: Positive for rectal pain. Negative for abdominal distention, abdominal pain, anal bleeding, blood in stool, constipation, diarrhea, nausea and vomiting.   Endocrine: Negative for cold intolerance and heat intolerance.   Genitourinary: Negative for dysuria and frequency.   Musculoskeletal: Negative for arthralgias, back pain and neck pain.   Skin: Negative for rash.   Allergic/Immunologic: Negative for immunocompromised state.   Neurological: Negative for dizziness, light-headedness and headaches.   Hematological: Does not bruise/bleed easily.   Psychiatric/Behavioral: Negative for confusion. The patient is not nervous/anxious.        Objective:      Physical Exam   Constitutional: She is oriented to person, place, and time. She appears well-developed and well-nourished.   HENT:   Head: Normocephalic.   Pulmonary/Chest: Effort normal. No respiratory distress.   Abdominal: Soft. Bowel sounds are normal. She exhibits no distension and no mass. There is no tenderness. There is no rebound and no guarding.   Genitourinary:   Genitourinary Comments: Perineum - normal perianal skin, no mass, no fissure, no external hemorrhoids  AMARI - good tone, no mass, no rectocele  Anoscopy - Grade 1 internal hemorrhoids - minimally inflamed     Musculoskeletal: Normal range of motion.    Neurological: She is alert and oriented to person, place, and time.   Skin: Skin is warm and dry.   Psychiatric: She has a normal mood and affect.         Lab Results   Component Value Date    WBC 8.39 09/08/2015    HGB 13.0 09/08/2015    HCT 39.1 09/08/2015    MCV 89 09/08/2015     09/08/2015     BMP  Lab Results   Component Value Date     08/08/2017    K 4.1 08/08/2017     08/08/2017    CO2 28 08/08/2017    BUN 16 08/08/2017    CREATININE 0.8 08/08/2017    CALCIUM 9.6 08/08/2017    ANIONGAP 9 08/08/2017    ESTGFRAFRICA >60.0 08/08/2017    EGFRNONAA >60.0 08/08/2017     CMP  Sodium   Date Value Ref Range Status   08/08/2017 142 136 - 145 mmol/L Final     Potassium   Date Value Ref Range Status   08/08/2017 4.1 3.5 - 5.1 mmol/L Final     Chloride   Date Value Ref Range Status   08/08/2017 105 95 - 110 mmol/L Final     CO2   Date Value Ref Range Status   08/08/2017 28 23 - 29 mmol/L Final     Glucose   Date Value Ref Range Status   08/08/2017 106 70 - 110 mg/dL Final     BUN, Bld   Date Value Ref Range Status   08/08/2017 16 8 - 23 mg/dL Final     Creatinine   Date Value Ref Range Status   08/08/2017 0.8 0.5 - 1.4 mg/dL Final     Calcium   Date Value Ref Range Status   08/08/2017 9.6 8.7 - 10.5 mg/dL Final     Total Protein   Date Value Ref Range Status   10/11/2016 7.0 6.0 - 8.4 g/dL Final     Albumin   Date Value Ref Range Status   10/11/2016 3.8 3.5 - 5.2 g/dL Final     Total Bilirubin   Date Value Ref Range Status   10/11/2016 0.4 0.1 - 1.0 mg/dL Final     Comment:     For infants and newborns, interpretation of results should be based  on gestational age, weight and in agreement with clinical  observations.  Premature Infant recommended reference ranges:  Up to 24 hours.............<8.0 mg/dL  Up to 48 hours............<12.0 mg/dL  3-5 days..................<15.0 mg/dL  6-29 days.................<15.0 mg/dL       Alkaline Phosphatase   Date Value Ref Range Status   10/11/2016 86 55 - 135 U/L  Final     AST   Date Value Ref Range Status   10/11/2016 22 10 - 40 U/L Final     ALT   Date Value Ref Range Status   10/11/2016 21 10 - 44 U/L Final     Anion Gap   Date Value Ref Range Status   08/08/2017 9 8 - 16 mmol/L Final     eGFR if    Date Value Ref Range Status   08/08/2017 >60.0 >60 mL/min/1.73 m^2 Final     eGFR if non    Date Value Ref Range Status   08/08/2017 >60.0 >60 mL/min/1.73 m^2 Final     Comment:     Calculation used to obtain the estimated glomerular filtration  rate (eGFR) is the CKD-EPI equation. Since race is unknown   in our information system, the eGFR values for   -American and Non--American patients are given   for each creatinine result.       No results found for: CEA    Assessment:       1. Fecal urgency    2. Colon cancer screening        Plan:   Will start with repeat colonoscopy, as last was >10 yrs ago.  Check defecography as well.  Recommended increased fiber/fluid intake    Keny Lui MD, FACS, FASCRS

## 2017-08-08 NOTE — LETTER
August 17, 2017        CHAPINCITO Reyes MD  [address] [facility] [address]  [place] [address]             [facility]  [address]  [facility] [address]  Phone: [phone]   Patient: Elizabeth Gleason   MR Number: [id number]   YOB: 1945   Date of Visit: 8/8/2017       Dear Dr. Reyes:    Thank you for referring Elizabeth Gleason to me for evaluation. Attached you will find relevant portions of my assessment and plan of care.    If you have questions, please do not hesitate to call me. I look forward to following Elizabeth Gleason along with you.    Sincerely,      Keny Lui MD            CC  No Recipients    Enclosure

## 2017-08-08 NOTE — LETTER
August 11, 2017      [place] [id number] [facility] [address]           [facility]  [address]  [facility] [address]  Phone: [phone]          Patient: Elizabeth Gleason   MR Number: [id number]   YOB: 1945   Date of Visit: 8/8/2017       Dear [facility]:    Thank you for referring Elizabeth Gleason to me for evaluation. Attached you will find relevant portions of my assessment and plan of care.    If you have questions, please do not hesitate to call me. I look forward to following Elizabeth Gleason along with you.    Sincerely,        Enclosure  CC:  No Recipients    If you would like to receive this communication electronically, please contact [email or web address] or [phone] to request more information on [employer] Link access.    For providers and/or their staff who would like to refer a patient to [facility], please contact us through our one-stop-shop provider referral line, [facility], at [phone].    If you feel you have received this communication in error or would no longer like to receive these types of communications, please e-mail [email or web address]

## 2017-08-14 ENCOUNTER — TELEPHONE (OUTPATIENT)
Dept: SURGERY | Facility: CLINIC | Age: 72
End: 2017-08-14

## 2017-08-14 NOTE — TELEPHONE ENCOUNTER
----- Message from Sarah Del Rio sent at 8/14/2017 10:24 AM CDT -----  Contact: pt#[phone]  Pt wants to know when she needs to take fleet enemas. Please call

## 2017-08-15 ENCOUNTER — OFFICE VISIT (OUTPATIENT)
Dept: INTERNAL MEDICINE | Facility: CLINIC | Age: 72
End: 2017-08-15
Payer: MEDICARE

## 2017-08-15 VITALS
WEIGHT: 153 LBS | SYSTOLIC BLOOD PRESSURE: 136 MMHG | DIASTOLIC BLOOD PRESSURE: 65 MMHG | BODY MASS INDEX: 25.49 KG/M2 | TEMPERATURE: 98 F | HEART RATE: 78 BPM | HEIGHT: 65 IN

## 2017-08-15 DIAGNOSIS — Z78.0 POSTMENOPAUSAL STATUS (AGE-RELATED) (NATURAL): ICD-10-CM

## 2017-08-15 DIAGNOSIS — E78.5 HYPERLIPIDEMIA, UNSPECIFIED HYPERLIPIDEMIA TYPE: ICD-10-CM

## 2017-08-15 DIAGNOSIS — R73.01 IMPAIRED FASTING GLUCOSE: ICD-10-CM

## 2017-08-15 DIAGNOSIS — Z12.31 OTHER SCREENING MAMMOGRAM: ICD-10-CM

## 2017-08-15 DIAGNOSIS — I10 ESSENTIAL HYPERTENSION: Primary | ICD-10-CM

## 2017-08-15 DIAGNOSIS — E03.9 HYPOTHYROIDISM, UNSPECIFIED TYPE: ICD-10-CM

## 2017-08-15 PROCEDURE — 3075F SYST BP GE 130 - 139MM HG: CPT | Mod: ,,, | Performed by: INTERNAL MEDICINE

## 2017-08-15 PROCEDURE — 1159F MED LIST DOCD IN RCRD: CPT | Mod: ,,, | Performed by: INTERNAL MEDICINE

## 2017-08-15 PROCEDURE — 3078F DIAST BP <80 MM HG: CPT | Mod: ,,, | Performed by: INTERNAL MEDICINE

## 2017-08-15 PROCEDURE — 99214 OFFICE O/P EST MOD 30 MIN: CPT | Mod: S$PBB,,, | Performed by: INTERNAL MEDICINE

## 2017-08-15 PROCEDURE — 99999 PR PBB SHADOW E&M-EST. PATIENT-LVL IV: CPT | Mod: PBBFAC,,, | Performed by: INTERNAL MEDICINE

## 2017-08-15 PROCEDURE — 1126F AMNT PAIN NOTED NONE PRSNT: CPT | Mod: ,,, | Performed by: INTERNAL MEDICINE

## 2017-08-15 PROCEDURE — 99214 OFFICE O/P EST MOD 30 MIN: CPT | Mod: PBBFAC,PO | Performed by: INTERNAL MEDICINE

## 2017-08-15 RX ORDER — DIAZEPAM 5 MG/1
5 TABLET ORAL EVERY 12 HOURS PRN
Qty: 60 TABLET | Refills: 1 | Status: SHIPPED | OUTPATIENT
Start: 2017-08-15 | End: 2019-04-26

## 2017-08-15 RX ORDER — LEVOTHYROXINE SODIUM 88 UG/1
88 TABLET ORAL DAILY
Qty: 90 TABLET | Refills: 3 | Status: SHIPPED | OUTPATIENT
Start: 2017-08-15 | End: 2018-03-01 | Stop reason: SDUPTHER

## 2017-08-15 RX ORDER — LISINOPRIL AND HYDROCHLOROTHIAZIDE 12.5; 2 MG/1; MG/1
1 TABLET ORAL DAILY
Qty: 90 TABLET | Refills: 3 | Status: SHIPPED | OUTPATIENT
Start: 2017-08-15 | End: 2017-11-16 | Stop reason: ALTCHOICE

## 2017-08-16 ENCOUNTER — TELEPHONE (OUTPATIENT)
Dept: RADIOLOGY | Facility: HOSPITAL | Age: 72
End: 2017-08-16

## 2017-08-16 NOTE — PROGRESS NOTES
This office note has been dictated.  HISTORY:  This is a 71-year-old lady following up on hypertension, dyslipidemia,   hypothyroidism, impaired fasting blood sugar.  She reports that all is well,   with no chest pain, palpitations, syncope, cough, shortness of breath,   claudication, edema.  Taking all medications as directed.    CURRENT MEDICATIONS:  All noted and reviewed in the electronic medical record   medication list.    REVIEW OF SYSTEMS:  CONSTITUTIONAL:  No fever.  No chills.  No generalized body aches.  CARDIOVASCULAR:  No chest pain.  No palpitations.  No syncope.  RESPIRATORY:  No cough.  No shortness of breath.  GASTROINTESTINAL:  No nausea or vomiting.  No abdominal pain.  No diarrhea or   change in bowel habits.    PAST MEDICAL HISTORY, PAST SURGICAL HISTORY, FAMILY AND SOCIAL HISTORY:  All   noted and reviewed in our electronic medical record history sections.    PHYSICAL EXAMINATION:  GENERAL:  Pleasant, alert, appropriately groomed lady.  VITAL SIGNS:  Blood pressure taken manually by this examiner is 130/62.  Other   vital signs are normal.  EYES:  Sclerae white, nonicteric.  HEENT:  Normocephalic.  NECK:  Supple.  No masses.  No thyromegaly.  LUNGS:  Clear to auscultation and normal to percussion.  CARDIOVASCULAR:  Regular rate and rhythm.  No significant murmur.  Carotids are   full bilaterally without bruits.  There is no peripheral extremity edema.  There   is no abdominal bruit.  GASTROINTESTINAL:  The abdomen is soft and benign.  No masses, tenderness or   organomegaly.  MENTAL STATUS:  Alert, oriented.  Affect and mood all appropriate.    DATA:  Lab data noted and reviewed from 08/08/2017, glycohemoglobin is 5.3,   basic metabolic profile normal.    IMPRESSION:  1.  Hypertension, well controlled.  2.  Dyslipidemia, on statin therapy.  3.  Hypothyroidism, on replacement therapy.  4.  Impaired fasting blood sugar, stable.    PLAN:  1.  Update bone densitometry and mammogram.  2.  Return to  clinic in November of this year for followup and review.      PB/HN  dd: 08/16/2017 12:13:26 (CDT)  td: 08/17/2017 00:06:07 (CDT)  Doc ID   #[id number]  Job ID #[id number]    CC:

## 2017-08-17 ENCOUNTER — HOSPITAL ENCOUNTER (OUTPATIENT)
Dept: RADIOLOGY | Facility: HOSPITAL | Age: 72
Discharge: HOME | End: 2017-08-17
Attending: COLON & RECTAL SURGERY
Payer: MEDICARE

## 2017-08-17 ENCOUNTER — TELEPHONE (OUTPATIENT)
Dept: INTERNAL MEDICINE | Facility: CLINIC | Age: 72
End: 2017-08-17

## 2017-08-17 DIAGNOSIS — R80.9 PROTEINURIA, UNSPECIFIED TYPE: Primary | ICD-10-CM

## 2017-08-17 DIAGNOSIS — R15.2 FECAL URGENCY: ICD-10-CM

## 2017-08-17 PROCEDURE — 74270 X-RAY XM COLON 1CNTRST STD: CPT | Mod: TC

## 2017-08-17 PROCEDURE — 74270 X-RAY XM COLON 1CNTRST STD: CPT | Mod: 26,GC,, | Performed by: RADIOLOGY

## 2017-08-17 NOTE — TELEPHONE ENCOUNTER
----- Message from Marie Smith sent at 8/17/2017  1:30 PM CDT -----  Contact: Pt [phone]  Pt would like to talk to Dr Reyes regarding a call she got from [facility] about a program they want to put her in for having protein in her blood. Please advise pt.

## 2017-08-17 NOTE — TELEPHONE ENCOUNTER
Pt advises that she received a call from [facility] about participating in a study for protein in her urine.    She is concerned.    After conferring with Dr. Reyes, pt's kidney function is fine and that this is a study and not a treatment and only an option to participate.    However he states that he will retest her urine in one month.    Verbalized understanding and scheduled on 09/19/17.    Urine orders linked.

## 2017-08-25 ENCOUNTER — ANESTHESIA EVENT (OUTPATIENT)
Dept: ENDOSCOPY | Facility: HOSPITAL | Age: 72
End: 2017-08-25
Payer: MEDICARE

## 2017-08-25 ENCOUNTER — HOSPITAL ENCOUNTER (OUTPATIENT)
Facility: HOSPITAL | Age: 72
Discharge: HOME | End: 2017-08-25
Attending: COLON & RECTAL SURGERY | Admitting: COLON & RECTAL SURGERY
Payer: MEDICARE

## 2017-08-25 ENCOUNTER — SURGERY (OUTPATIENT)
Age: 72
End: 2017-08-25

## 2017-08-25 ENCOUNTER — ANESTHESIA (OUTPATIENT)
Dept: ENDOSCOPY | Facility: HOSPITAL | Age: 72
End: 2017-08-25
Payer: MEDICARE

## 2017-08-25 VITALS
WEIGHT: 143 LBS | RESPIRATION RATE: 20 BRPM | HEIGHT: 65 IN | SYSTOLIC BLOOD PRESSURE: 159 MMHG | TEMPERATURE: 98 F | HEART RATE: 64 BPM | DIASTOLIC BLOOD PRESSURE: 61 MMHG | BODY MASS INDEX: 23.82 KG/M2 | OXYGEN SATURATION: 96 %

## 2017-08-25 DIAGNOSIS — Z12.11 SCREENING FOR COLON CANCER: ICD-10-CM

## 2017-08-25 DIAGNOSIS — I10 ESSENTIAL HYPERTENSION: Primary | ICD-10-CM

## 2017-08-25 PROCEDURE — G0121 COLON CA SCRN NOT HI RSK IND: HCPCS | Mod: ,,, | Performed by: COLON & RECTAL SURGERY

## 2017-08-25 PROCEDURE — 25000003 PHARM REV CODE 250: Performed by: NURSE PRACTITIONER

## 2017-08-25 PROCEDURE — 37000008 HC ANESTHESIA 1ST 15 MINUTES: Performed by: COLON & RECTAL SURGERY

## 2017-08-25 PROCEDURE — G0121 COLON CA SCRN NOT HI RSK IND: HCPCS | Performed by: COLON & RECTAL SURGERY

## 2017-08-25 PROCEDURE — D9220A PRA ANESTHESIA: Mod: 33,CRNA,, | Performed by: NURSE ANESTHETIST, CERTIFIED REGISTERED

## 2017-08-25 PROCEDURE — D9220A PRA ANESTHESIA: Mod: 33,ANES,, | Performed by: ANESTHESIOLOGY

## 2017-08-25 PROCEDURE — 37000009 HC ANESTHESIA EA ADD 15 MINS: Performed by: COLON & RECTAL SURGERY

## 2017-08-25 PROCEDURE — 63600175 PHARM REV CODE 636 W HCPCS: Performed by: NURSE ANESTHETIST, CERTIFIED REGISTERED

## 2017-08-25 RX ORDER — SODIUM CHLORIDE 9 MG/ML
INJECTION, SOLUTION INTRAVENOUS CONTINUOUS
Status: DISCONTINUED | OUTPATIENT
Start: 2017-08-25 | End: 2017-08-25 | Stop reason: HOSPADM

## 2017-08-25 RX ORDER — LIDOCAINE HCL/PF 100 MG/5ML
SYRINGE (ML) INTRAVENOUS
Status: DISCONTINUED | OUTPATIENT
Start: 2017-08-25 | End: 2017-08-25

## 2017-08-25 RX ORDER — PROPOFOL 10 MG/ML
VIAL (ML) INTRAVENOUS
Status: DISCONTINUED | OUTPATIENT
Start: 2017-08-25 | End: 2017-08-25

## 2017-08-25 RX ORDER — PROPOFOL 10 MG/ML
VIAL (ML) INTRAVENOUS CONTINUOUS PRN
Status: DISCONTINUED | OUTPATIENT
Start: 2017-08-25 | End: 2017-08-25

## 2017-08-25 RX ADMIN — SODIUM CHLORIDE: 0.9 INJECTION, SOLUTION INTRAVENOUS at 10:08

## 2017-08-25 RX ADMIN — PROPOFOL 150 MCG/KG/MIN: 10 INJECTION, EMULSION INTRAVENOUS at 11:08

## 2017-08-25 RX ADMIN — PROPOFOL 50 MG: 10 INJECTION, EMULSION INTRAVENOUS at 11:08

## 2017-08-25 RX ADMIN — LIDOCAINE HYDROCHLORIDE 75 MG: 20 INJECTION, SOLUTION INTRAVENOUS at 11:08

## 2017-08-25 NOTE — TRANSFER OF CARE
"Anesthesia Transfer of Care Note    Patient: Elizabeth Gleason    Procedure(s) Performed: Procedure(s) (LRB):  COLONOSCOPY (N/A)    Patient location: GI    Anesthesia Type: general    Transport from OR: Transported from OR on room air with adequate spontaneous ventilation    Post pain: adequate analgesia    Post assessment: no apparent anesthetic complications and tolerated procedure well    Post vital signs: stable    Level of consciousness: awake and alert    Nausea/Vomiting: no nausea/vomiting    Complications: none    Transfer of care protocol was followed      Last vitals:   Visit Vitals  BP (!) 162/72 (BP Location: Left arm, Patient Position: Lying)   Pulse 77   Temp 36.7 °C (98.1 °F) (Oral)   Resp 18   Ht 5' 5" (1.651 m)   Wt 64.9 kg (143 lb)   SpO2 98%   Breastfeeding? No   BMI 23.80 kg/m²     "

## 2017-08-25 NOTE — DISCHARGE INSTRUCTIONS
Colonoscopy     A camera attached to a flexible tube with a viewing lens is used to take video pictures.     Colonoscopy is a test to view the inside of your lower digestive tract (colon and rectum). Sometimes it can show the last part of the small intestine (ileum). During the test, small pieces of tissue may be removed for testing. This is called a biopsy. Small growths, such as polyps, may also be removed.   Why is colonoscopy done?  The test is done to help look for colon cancer. And it can help find the source of abdominal pain, bleeding, and changes in bowel habits. It may be needed once a year, depending on factors such as your:  · Age  · Health history  · Family health history  · Symptoms  · Results from any prior colonoscopy  Risks and possible complications  These include:  · Bleeding               · A puncture or tear in the colon   · Risks of anesthesia  · A cancer lesion not being seen  Getting ready   To prepare for the test:  · Talk with your healthcare provider about the risks of the test (see below). Also ask your healthcare provider about alternatives to the test.  · Tell your healthcare provider about any medicines you take. Also tell him or her about any health conditions you may have.  · Make sure your rectum and colon are empty for the test. Follow the diet and bowel prep instructions exactly. If you dont, the test may need to be rescheduled.  · Plan for a friend or family member to drive you home after the test.     Colonoscopy provides an inside view of the entire colon.     You may discuss the results with your doctor right away or at a future visit.  During the test   The test is usually done in the hospital on an outpatient basis. This means you go home the same day. The procedure takes about 30 minutes. During that time:  · You are given relaxing (sedating) medicine through an IV line. You may be drowsy, or fully asleep.  · The healthcare provider will first give you a physical exam to  check for anal and rectal problems.  · Then the anus is lubricated and the scope inserted.  · If you are awake, you may have a feeling similar to needing to have a bowel movement. You may also feel pressure as air is pumped into the colon. Its OK to pass gas during the procedure.  · Biopsy, polyp removal, or other treatments may be done during the test.  After the test   You may have gas right after the test. It can help to try to pass it to help prevent later bloating. Your healthcare provider may discuss the results with you right away. Or you may need to schedule a follow-up visit to talk about the results. After the test, you can go back to your normal eating and other activities. You may be tired from the sedation and need to rest for a few hours.  Date Last Reviewed: 11/1/2016  © 0920-7662 [employer] [address] [place] [address], [place] [address]. All rights reserved. This information is not intended as a substitute for professional medical care. Always follow your healthcare professional's instructions.

## 2017-08-25 NOTE — ANESTHESIA POSTPROCEDURE EVALUATION
"Anesthesia Post Evaluation    Patient: Elizabeth Gleason    Procedure(s) Performed: Procedure(s) (LRB):  COLONOSCOPY (N/A)    Final Anesthesia Type: general  Patient location during evaluation: PACU  Patient participation: Yes- Able to Participate  Level of consciousness: awake and alert  Post-procedure vital signs: reviewed and stable  Pain management: adequate  Airway patency: patent  PONV status at discharge: No PONV  Anesthetic complications: no      Cardiovascular status: blood pressure returned to baseline  Respiratory status: unassisted  Hydration status: euvolemic  Follow-up not needed.        Visit Vitals  BP (!) 159/61   Pulse 64   Temp 36.8 °C (98.2 °F) (Oral)   Resp 20   Ht 5' 5" (1.651 m)   Wt 64.9 kg (143 lb)   SpO2 96%   Breastfeeding? No   BMI 23.80 kg/m²       Pain/Osiris Score: Pain Assessment Performed: Yes (8/25/2017 12:10 PM)  Presence of Pain: denies (8/25/2017 12:10 PM)  Pain Rating Prior to Med Admin: 0 (8/25/2017 12:10 PM)  Osiris Score: 10 (8/25/2017 12:10 PM)      "

## 2017-08-25 NOTE — H&P
Procedure : Colonoscopy    Indication(s):  asymptomatic screening exam    Review of patient's allergies indicates:   Allergen Reactions    Levaquin [levofloxacin]      Heart raced and felt faint    Tramadol Nausea And Vomiting       Past Medical History:   Diagnosis Date    ALLERGIC RHINITIS     Carotid artery narrowing     rt worse than the left    HEARING LOSS     Hyperlipidemia     Hypertension     Hypothyroid     Impaired fasting glucose     Menopause present     Migraine headache     Osteopenia     Tuberculosis     childhood TB       Prior to Admission medications    Medication Sig Start Date End Date Taking? Authorizing Provider   ascorbic acid (VITAMIN C) 250 MG tablet Take 250 mg by mouth once daily.   Yes Historical Provider, MD   aspirin (ECOTRIN) 81 MG EC tablet Take 81 mg by mouth once daily.   Yes Historical Provider, MD   calcium-vitamin D 500-125 mg-unit tablet Take 1 tablet by mouth 2 (two) times daily.     Yes Historical Provider, MD   colestipol (COLESTID) 1 gram Tab Take 1 tablet (1 g total) by mouth 2 (two) times daily. 10/18/16 10/18/17 Yes CHAPINCITO Reyes MD   cranberry 400 mg Cap Take by mouth.   Yes Historical Provider, MD   fish oil-omega-3 fatty acids 300-1,000 mg capsule Take 2 g by mouth once daily.   Yes Historical Provider, MD   levothyroxine (SYNTHROID) 88 MCG tablet Take 1 tablet (88 mcg total) by mouth once daily. 8/15/17  Yes CHAPINCITO Reyes MD   lisinopril-hydrochlorothiazide (PRINZIDE,ZESTORETIC) 20-12.5 mg per tablet Take 1 tablet by mouth once daily. 8/15/17  Yes CHAPINCITO Reyes MD   neomycin-polymyxin-hydrocortisone (CORTISPORIN) otic solution INSTILL 4 DROPS FOUR TIMES DAILY IN AFFECTED EAR 6/17/16  Yes CHAPINCITO Reyes MD   shark liver oil-cocoa butter 0.25-3% (PREPARATION H) 0.25-3 % suppository Place 1 suppository rectally as needed for Hemorrhoids.   Yes Historical Provider, MD   UBIDECARENONE (CO Q-10 ORAL) Take by mouth.   Yes Historical Provider, MD    blood sugar diagnostic Strp 1 freestyle lite test strip weekly 2/11/16   CHAPINCITO Reyes MD   diazePAM (VALIUM) 5 MG tablet Take 1 tablet (5 mg total) by mouth every 12 (twelve) hours as needed. 8/15/17   CHAPINCITO Reyes MD   fexofenadine (ALLEGRA) 180 MG tablet Take 1 tablet (180 mg total) by mouth once daily. 1/22/15   CHAPINCITO Reyes MD   fluticasone (FLONASE) 50 mcg/actuation nasal spray 2 sprays by Each Nare route once daily. 6/14/16   CHAPINCITO Reyes MD   hydrocortisone-pramoxine (ANALPRAM-HC) 2.5-1 % Crea Place rectally 3 (three) times daily. 2/7/17   Kristyn Castro MD   hydrOXYzine pamoate (VISTARIL) 25 MG Cap 1 pill 4 hours before MRI, second pill 30 mins before MRI. Do not drive, you will need to secure a ride 2/14/17   Hector Oviedo PA-C   omeprazole (PRILOSEC) 20 MG capsule Take 1 capsule (20 mg total) by mouth once daily. 6/14/16   CHAPINCITO Reyes MD   propranolol (INDERAL) 10 MG tablet 1 pill up to 3x a day as needed for stress/headache 1/25/17   Hector Oviedo PA-C   rosuvastatin (CRESTOR) 5 MG tablet Take 1 tablet (5 mg total) by mouth once daily. 2/2/17 2/2/18  CHAPINCITO Reyes MD       Sedation Problems: NO    Family History   Problem Relation Age of Onset    Heart disease Mother      cabg    Kidney disease Mother      was on dialysis    Stroke Father     Cancer Brother      lung cancer    Colon cancer Maternal Aunt     Breast cancer Cousin      paternal FIRST cousin    Ovarian cancer Neg Hx        Fam Hx of Sedation Problems: NO    Social History     Social History    Marital status:      Spouse name: N/A    Number of children: N/A    Years of education: N/A     Occupational History    retired teller      Social History Main Topics    Smoking status: Never Smoker    Smokeless tobacco: Never Used    Alcohol use 0.0 oz/week      Comment: occasionally    Drug use: No    Sexual activity: No     Other Topics Concern    Not on file     Social History  Narrative    She exercises regularly.       Review of Systems -     Respiratory ROS: no cough, shortness of breath, or wheezing  Cardiovascular ROS: no chest pain or dyspnea on exertion  Gastrointestinal ROS: no abdominal pain, change in bowel habits, or black or bloody stools  Musculoskeletal ROS: negative  Neurological ROS: no TIA or stroke symptoms        Physical Exam:  General: no distress  Head: normocephalic  Airway:  normal oropharynx, airway normal  Neck: supple, symmetrical, trachea midline  Lungs:  normal respiratory effort  Heart: regular rate and rhythm  Abdomen: soft, non-tender non-distented; bowel sounds normal; no masses,  no organomegaly  Extremities: no cyanosis or edema, or clubbing       Deep Sedation: Mallampati Score per anesthesia     SedationPlan :Moderate     ASA : II    Patient is medically cleared for anesthesia.    Anesthesia/Surgery risks, benefits and alternative options discussed and understood by patient/family.

## 2017-08-25 NOTE — PATIENT INSTRUCTIONS
Discharge Summary/Instructions after an Endoscopic Procedure  Patient Name: Elizabeth Gleason  Patient MRN: [id number]  Patient YOB: 1945 Friday, August 25, 2017  Keny Lui MD  RESTRICTIONS:  During your procedure today, you received medications for sedation.  These   medications may affect your judgment, balance and coordination.  Therefore,   for 24 hours, you have the following restrictions:   - DO NOT drive a car, operate machinery, make legal/financial decisions,   sign important papers or drink alcohol.    ACTIVITY:  The following day: return to full activity including work, except no heavy   lifting, straining or running for 3 days if polyps were removed.  DIET:  Eat and drink normally unless instructed otherwise.  TREATMENT FOR COMMON SIDE EFFECTS:  - Mild abdominal pain, belching, bloating or excessive gas: rest, eat   lightly and use a heating pad.  - Sore Throat: treat with throat lozenges and/or gargle with warm salt   water.  SYMPTOMS TO WATCH FOR AND REPORT TO YOUR PHYSICIAN:  1. Abdominal pain or bloating, other than gas cramps.  2. Chest pain.  3. Back pain.  4. Chills or fever occurring within 24 hours after the procedure.  5. Rectal bleeding, which would show as bright red, maroon, or black stools.   (A tablespoon of blood from the rectum is not serious, especially if   hemorrhoids are present.)  6. Vomiting.  7. Weakness or dizziness.  8. Because air was used during the procedure, expelling large amounts of air   from your rectum or belching is normal.  9. If a bowel prep was taken, you may not have a bowel movement for 1-3   days.  This is normal.  GO DIRECTLY TO THE EMERGENCY ROOM IF YOU HAVE ANY OF THE FOLLOWING:   Difficulty breathing  Chills and/or fever over 101 F   Persistent vomiting and/or vomiting blood   Severe abdominal pain   Severe chest pain   Black, tarry stools   Bleeding- more than one tablespoon  Your doctor recommends these additional instructions:  If any biopsies  were taken, your doctorâs clinic will call you in 1 to 2   weeks with any results.  You are being discharged to home.   Eat a high fiber diet.   Continue your present medications.   You have a contact number available for emergencies.  The signs and symptoms   of potential delayed complications were discussed with you.  You may return   to normal activities tomorrow.  Written discharge instructions were   provided to you.   Your physician has recommended a repeat colonoscopy in 10 years for   screening purposes.  For questions, problems or results please call your physician - Keny Lui MD at Work:  [phone], Work:  [phone].  [facility] [place], EMERGENCY ROOM PHONE NUMBER: [phone]  IF A COMPLICATION OR EMERGENCY SITUATION ARISES AND YOU ARE UNABLE TO REACH   YOUR PHYSICIAN - GO DIRECTLY TO THE EMERGENCY ROOM.  Keny Lui MD  8/25/2017 11:37:38 AM  This report has been verified and signed electronically.

## 2017-08-25 NOTE — ANESTHESIA PREPROCEDURE EVALUATION
08/25/2017  Elizabeth Gleason is a 71 y.o., female.    Anesthesia Evaluation    I have reviewed the Patient Summary Reports.        Review of Systems  Anesthesia Hx:  No problems with previous Anesthesia        Physical Exam  General:  Well nourished    Airway/Jaw/Neck:  Airway Findings: Mouth Opening: Normal General Airway Assessment: Adult  TM Distance: Normal, at least 6 cm  Jaw/Neck Findings:  Neck ROM: Normal ROM       Chest/Lungs:  Chest/Lungs Findings: Clear to auscultation     Heart/Vascular:  Heart Findings: Rate: Normal  Rhythm: Regular Rhythm        Mental Status:  Mental Status Findings:  Cooperative, Alert and Oriented         Anesthesia Plan  Type of Anesthesia, risks & benefits discussed:  Anesthesia Type:  general  Patient's Preference:   Intra-op Monitoring Plan:   Intra-op Monitoring Plan Comments:   Post Op Pain Control Plan:   Post Op Pain Control Plan Comments:   Induction:    Beta Blocker:  Patient is not currently on a Beta-Blocker (No further documentation required).       Informed Consent: Patient understands risks and agrees with Anesthesia plan.  Questions answered. Anesthesia consent signed with patient.  ASA Score: 3     Day of Surgery Review of History & Physical:            Ready For Surgery From Anesthesia Perspective.

## 2017-09-01 ENCOUNTER — TELEPHONE (OUTPATIENT)
Dept: ENDOSCOPY | Facility: HOSPITAL | Age: 72
End: 2017-09-01

## 2017-09-19 ENCOUNTER — LAB VISIT (OUTPATIENT)
Dept: LAB | Facility: HOSPITAL | Age: 72
End: 2017-09-19
Attending: INTERNAL MEDICINE
Payer: MEDICARE

## 2017-09-19 DIAGNOSIS — R80.9 PROTEINURIA, UNSPECIFIED TYPE: ICD-10-CM

## 2017-09-19 LAB
BILIRUB UR QL STRIP: NEGATIVE
CLARITY UR REFRACT.AUTO: CLEAR
COLOR UR AUTO: YELLOW
GLUCOSE UR QL STRIP: NEGATIVE
HGB UR QL STRIP: NEGATIVE
KETONES UR QL STRIP: NEGATIVE
LEUKOCYTE ESTERASE UR QL STRIP: NEGATIVE
NITRITE UR QL STRIP: NEGATIVE
PH UR STRIP: 5 [PH] (ref 5–8)
PROT UR QL STRIP: NEGATIVE
SP GR UR STRIP: 1.01 (ref 1–1.03)
URN SPEC COLLECT METH UR: NORMAL
UROBILINOGEN UR STRIP-ACNC: NEGATIVE EU/DL

## 2017-09-19 PROCEDURE — 81003 URINALYSIS AUTO W/O SCOPE: CPT

## 2017-09-20 ENCOUNTER — TELEPHONE (OUTPATIENT)
Dept: INTERNAL MEDICINE | Facility: CLINIC | Age: 72
End: 2017-09-20

## 2017-09-20 DIAGNOSIS — E78.5 HYPERLIPIDEMIA, UNSPECIFIED HYPERLIPIDEMIA TYPE: Primary | ICD-10-CM

## 2017-09-27 NOTE — TELEPHONE ENCOUNTER
Spoke with pt to advise of results.    Verbalized understanding.    Pt advises that she is due for f/u in November with labs to f/u on her chol.    Scheduled.

## 2017-10-16 ENCOUNTER — TELEPHONE (OUTPATIENT)
Dept: INTERNAL MEDICINE | Facility: CLINIC | Age: 72
End: 2017-10-16

## 2017-10-16 NOTE — TELEPHONE ENCOUNTER
----- Message from Floresita Xie sent at 10/16/2017  7:04 AM CDT -----  Contact: patient [phone]  cell phone  Pt called about frequent uti's and c/o pressure with urination. She had a few pyridium from the last time she went to the ER for blood in urine with a uti. Patient would like advice from Evangel[facility] and is aware that  is not in clinic. Pt is off work tomorrow. Please let her know if you can fit her in or order a u/a.

## 2017-10-16 NOTE — TELEPHONE ENCOUNTER
Spoke with pt who advises that since taking the Pyridium she is feeling better but would still like an appt to be sure that she does not need abx.    Therefore, she is agreeable to an appt on 10/17/17 at 10:40 am with Dr. ASKSHI Palma.

## 2017-10-17 ENCOUNTER — OFFICE VISIT (OUTPATIENT)
Dept: INTERNAL MEDICINE | Facility: CLINIC | Age: 72
End: 2017-10-17
Payer: MEDICARE

## 2017-10-17 ENCOUNTER — LAB VISIT (OUTPATIENT)
Dept: LAB | Facility: HOSPITAL | Age: 72
End: 2017-10-17
Attending: INTERNAL MEDICINE
Payer: MEDICARE

## 2017-10-17 VITALS
DIASTOLIC BLOOD PRESSURE: 80 MMHG | TEMPERATURE: 98 F | WEIGHT: 155.19 LBS | SYSTOLIC BLOOD PRESSURE: 142 MMHG | OXYGEN SATURATION: 98 % | HEART RATE: 75 BPM | HEIGHT: 65 IN | BODY MASS INDEX: 25.85 KG/M2

## 2017-10-17 DIAGNOSIS — R10.2 SUPRAPUBIC PRESSURE: ICD-10-CM

## 2017-10-17 DIAGNOSIS — R10.2 SUPRAPUBIC PRESSURE: Primary | ICD-10-CM

## 2017-10-17 LAB
BACTERIA #/AREA URNS AUTO: NORMAL /HPF
BILIRUB UR QL STRIP: NEGATIVE
CLARITY UR REFRACT.AUTO: CLEAR
COLOR UR AUTO: YELLOW
GLUCOSE UR QL STRIP: NEGATIVE
HGB UR QL STRIP: NEGATIVE
KETONES UR QL STRIP: NEGATIVE
LEUKOCYTE ESTERASE UR QL STRIP: NEGATIVE
MICROSCOPIC COMMENT: NORMAL
NITRITE UR QL STRIP: NEGATIVE
NON-SQ EPI CELLS #/AREA URNS AUTO: <1 /HPF
PH UR STRIP: 6 [PH] (ref 5–8)
PROT UR QL STRIP: NEGATIVE
RBC #/AREA URNS AUTO: 0 /HPF (ref 0–4)
SP GR UR STRIP: 1.01 (ref 1–1.03)
SQUAMOUS #/AREA URNS AUTO: 1 /HPF
URN SPEC COLLECT METH UR: NORMAL
UROBILINOGEN UR STRIP-ACNC: NEGATIVE EU/DL
WBC #/AREA URNS AUTO: 0 /HPF (ref 0–5)

## 2017-10-17 PROCEDURE — 99213 OFFICE O/P EST LOW 20 MIN: CPT | Mod: S$PBB,,, | Performed by: INTERNAL MEDICINE

## 2017-10-17 PROCEDURE — 99999 PR PBB SHADOW E&M-EST. PATIENT-LVL III: CPT | Mod: PBBFAC,,, | Performed by: INTERNAL MEDICINE

## 2017-10-17 PROCEDURE — 99213 OFFICE O/P EST LOW 20 MIN: CPT | Mod: PBBFAC,PO | Performed by: INTERNAL MEDICINE

## 2017-10-17 PROCEDURE — 81001 URINALYSIS AUTO W/SCOPE: CPT

## 2017-10-17 PROCEDURE — 87086 URINE CULTURE/COLONY COUNT: CPT

## 2017-10-17 RX ORDER — PHENAZOPYRIDINE HYDROCHLORIDE 100 MG/1
100 TABLET, FILM COATED ORAL 3 TIMES DAILY PRN
Qty: 30 TABLET | Refills: 1 | Status: SHIPPED | OUTPATIENT
Start: 2017-10-17 | End: 2017-10-27

## 2017-10-17 NOTE — PROGRESS NOTES
Subjective:       Patient ID: Elizabeth Gleason is a 72 y.o. female.    Chief Complaint: Urinary Tract Infection    HPI  This is a new patient to me but established to [facility].    She reports suprapubic pressure on Saturday night and Sunday night. Associated symptoms included urinary frequency, nausea, and low back pain. She denies fevers, flank pain.Took pyridium on Sunday with some improvement. Symptoms now resolved but she wants to make sure an infection is not present. Has since increased water intake and stopped daily soda intake. She reports a UTI in June that required ED visit and cipro. She denies constipation. Previously saw urology in 2013- dx'd with cystocele.        Review of Systems   Constitutional: Negative for fatigue, fever and unexpected weight change.   Respiratory: Negative for cough and shortness of breath.    Cardiovascular: Negative for chest pain and leg swelling.   Gastrointestinal: Positive for abdominal pain (suprapubic). Negative for blood in stool, constipation and diarrhea.   Endocrine: Negative for polydipsia and polyuria.   Genitourinary: Positive for frequency. Negative for difficulty urinating, dysuria, flank pain and hematuria.   Musculoskeletal: Positive for back pain (low back, not flank). Negative for gait problem and joint swelling.   Skin: Negative for pallor, rash and wound.   Allergic/Immunologic: Negative for immunocompromised state.   Neurological: Negative for numbness and headaches.   Hematological: Negative for adenopathy.       Objective:        Vitals:    10/17/17 1036   BP: (!) 142/80   Pulse: 75   Temp: 98.3 °F (36.8 °C)     Body mass index is 25.83 kg/m².    Physical Exam   Constitutional: She is oriented to person, place, and time. She appears well-developed and well-nourished. No distress.   HENT:   Head: Normocephalic and atraumatic.   Nose: Nose normal.   Eyes: Conjunctivae and EOM are normal. Right eye exhibits no discharge. Left eye exhibits no  discharge.   Neck: Normal range of motion. Neck supple.   Cardiovascular: Normal rate, regular rhythm, normal heart sounds and intact distal pulses.    Pulmonary/Chest: Effort normal and breath sounds normal.   Abdominal: Soft. Bowel sounds are normal. She exhibits no distension. There is no tenderness. There is no CVA tenderness.   Musculoskeletal: Normal range of motion. She exhibits no edema.   Neurological: She is alert and oriented to person, place, and time.   Skin: Skin is warm and dry. She is not diaphoretic. No erythema.   Psychiatric: She has a normal mood and affect. Her behavior is normal. Thought content normal.       Assessment:     1. Suprapubic pressure           Plan:         1. Suprapubic pressure  - Urinalysis; Future  - Urine culture; Future  - phenazopyridine (PYRIDIUM) 100 MG tablet; Take 1 tablet (100 mg total) by mouth 3 (three) times daily as needed for Pain.  Dispense: 30 tablet; Refill: 1      Patient was instructed to notify clinic if symptoms change, worsen or do not improve.

## 2017-10-18 LAB — BACTERIA UR CULT: NO GROWTH

## 2017-10-19 ENCOUNTER — TELEPHONE (OUTPATIENT)
Dept: INTERNAL MEDICINE | Facility: CLINIC | Age: 72
End: 2017-10-19

## 2017-10-19 NOTE — TELEPHONE ENCOUNTER
Please inform patient that urinalysis was normal and final urine culture was no growth. The urinalysis results were also mailed yesterday.

## 2017-10-19 NOTE — TELEPHONE ENCOUNTER
----- Message from Estrella Flores sent at 10/19/2017 11:57 AM CDT -----  Contact: [phone]  Patient would like to get test results.  Name of test (lab, mammo, etc.):  urine  Date of test:  10/17  Ordering provider: jett  Where was the test performed:  [place]  Comments:

## 2017-10-19 NOTE — OR NURSING
All pt sedation and monitoring done by crna.    

## 2017-10-26 ENCOUNTER — HOSPITAL ENCOUNTER (OUTPATIENT)
Dept: RADIOLOGY | Facility: HOSPITAL | Age: 72
Discharge: HOME | End: 2017-10-26
Attending: INTERNAL MEDICINE
Payer: MEDICARE

## 2017-10-26 DIAGNOSIS — Z12.31 SCREENING MAMMOGRAM, ENCOUNTER FOR: ICD-10-CM

## 2017-10-26 PROCEDURE — 77067 SCR MAMMO BI INCL CAD: CPT | Mod: 26,,, | Performed by: STUDENT IN AN ORGANIZED HEALTH CARE EDUCATION/TRAINING PROGRAM

## 2017-10-26 PROCEDURE — 77067 SCR MAMMO BI INCL CAD: CPT | Mod: TC

## 2017-10-26 PROCEDURE — 77063 BREAST TOMOSYNTHESIS BI: CPT | Mod: 26,,, | Performed by: STUDENT IN AN ORGANIZED HEALTH CARE EDUCATION/TRAINING PROGRAM

## 2017-10-30 ENCOUNTER — TELEPHONE (OUTPATIENT)
Dept: RADIOLOGY | Facility: HOSPITAL | Age: 72
End: 2017-10-30

## 2017-10-30 NOTE — TELEPHONE ENCOUNTER
Spoke with patient and explained mammogram findings.Patient expressed understanding of results. Patient is scheduled for a abnormal mammogram follow up appointment at [facility] [place] on 11/7/17.

## 2017-11-07 ENCOUNTER — HOSPITAL ENCOUNTER (OUTPATIENT)
Dept: RADIOLOGY | Facility: HOSPITAL | Age: 72
Discharge: HOME | End: 2017-11-07
Attending: INTERNAL MEDICINE
Payer: MEDICARE

## 2017-11-07 DIAGNOSIS — R92.8 ABNORMAL MAMMOGRAM: ICD-10-CM

## 2017-11-07 PROCEDURE — 77061 BREAST TOMOSYNTHESIS UNI: CPT | Mod: TC

## 2017-11-07 PROCEDURE — 77061 BREAST TOMOSYNTHESIS UNI: CPT | Mod: 26,,, | Performed by: RADIOLOGY

## 2017-11-07 PROCEDURE — 77065 DX MAMMO INCL CAD UNI: CPT | Mod: 26,,, | Performed by: RADIOLOGY

## 2017-11-07 PROCEDURE — 76642 ULTRASOUND BREAST LIMITED: CPT | Mod: TC,RT

## 2017-11-07 PROCEDURE — 76642 ULTRASOUND BREAST LIMITED: CPT | Mod: 26,RT,, | Performed by: RADIOLOGY

## 2017-11-09 ENCOUNTER — LAB VISIT (OUTPATIENT)
Dept: LAB | Facility: HOSPITAL | Age: 72
End: 2017-11-09
Attending: INTERNAL MEDICINE
Payer: MEDICARE

## 2017-11-09 DIAGNOSIS — E78.5 HYPERLIPIDEMIA, UNSPECIFIED HYPERLIPIDEMIA TYPE: ICD-10-CM

## 2017-11-09 LAB
ANION GAP SERPL CALC-SCNC: 8 MMOL/L
BUN SERPL-MCNC: 14 MG/DL
CALCIUM SERPL-MCNC: 9.7 MG/DL
CHLORIDE SERPL-SCNC: 105 MMOL/L
CHOLEST SERPL-MCNC: 278 MG/DL
CHOLEST/HDLC SERPL: 6.6 {RATIO}
CO2 SERPL-SCNC: 28 MMOL/L
CREAT SERPL-MCNC: 0.8 MG/DL
EST. GFR  (AFRICAN AMERICAN): >60 ML/MIN/1.73 M^2
EST. GFR  (NON AFRICAN AMERICAN): >60 ML/MIN/1.73 M^2
GLUCOSE SERPL-MCNC: 114 MG/DL
HDLC SERPL-MCNC: 42 MG/DL
HDLC SERPL: 15.1 %
LDLC SERPL CALC-MCNC: 165.2 MG/DL
NONHDLC SERPL-MCNC: 236 MG/DL
POTASSIUM SERPL-SCNC: 4 MMOL/L
SODIUM SERPL-SCNC: 141 MMOL/L
TRIGL SERPL-MCNC: 354 MG/DL

## 2017-11-09 PROCEDURE — 80048 BASIC METABOLIC PNL TOTAL CA: CPT

## 2017-11-09 PROCEDURE — 80061 LIPID PANEL: CPT

## 2017-11-09 PROCEDURE — 36415 COLL VENOUS BLD VENIPUNCTURE: CPT | Mod: PO

## 2017-11-16 ENCOUNTER — TELEPHONE (OUTPATIENT)
Dept: INTERNAL MEDICINE | Facility: CLINIC | Age: 72
End: 2017-11-16

## 2017-11-16 ENCOUNTER — OFFICE VISIT (OUTPATIENT)
Dept: INTERNAL MEDICINE | Facility: CLINIC | Age: 72
End: 2017-11-16
Payer: MEDICARE

## 2017-11-16 VITALS
SYSTOLIC BLOOD PRESSURE: 139 MMHG | TEMPERATURE: 99 F | BODY MASS INDEX: 25.93 KG/M2 | HEIGHT: 65 IN | WEIGHT: 155.63 LBS | DIASTOLIC BLOOD PRESSURE: 62 MMHG | RESPIRATION RATE: 14 BRPM | HEART RATE: 67 BPM

## 2017-11-16 DIAGNOSIS — E78.2 MIXED HYPERLIPIDEMIA: ICD-10-CM

## 2017-11-16 DIAGNOSIS — E03.9 ACQUIRED HYPOTHYROIDISM: ICD-10-CM

## 2017-11-16 DIAGNOSIS — I10 ESSENTIAL HYPERTENSION: Primary | ICD-10-CM

## 2017-11-16 DIAGNOSIS — M85.80 OSTEOPENIA, UNSPECIFIED LOCATION: ICD-10-CM

## 2017-11-16 DIAGNOSIS — M54.6 ACUTE LEFT-SIDED THORACIC BACK PAIN: ICD-10-CM

## 2017-11-16 PROCEDURE — 99214 OFFICE O/P EST MOD 30 MIN: CPT | Mod: S$PBB,,, | Performed by: INTERNAL MEDICINE

## 2017-11-16 PROCEDURE — 99999 PR PBB SHADOW E&M-EST. PATIENT-LVL III: CPT | Mod: PBBFAC,,, | Performed by: INTERNAL MEDICINE

## 2017-11-16 PROCEDURE — 99213 OFFICE O/P EST LOW 20 MIN: CPT | Mod: PBBFAC,PO | Performed by: INTERNAL MEDICINE

## 2017-11-16 RX ORDER — PRAVASTATIN SODIUM 20 MG/1
20 TABLET ORAL
Qty: 45 TABLET | Refills: 3 | Status: SHIPPED | OUTPATIENT
Start: 2017-11-16 | End: 2018-03-01

## 2017-11-16 RX ORDER — LISINOPRIL AND HYDROCHLOROTHIAZIDE 12.5; 2 MG/1; MG/1
1 TABLET ORAL DAILY
COMMUNITY
End: 2018-03-01 | Stop reason: SDUPTHER

## 2017-11-16 RX ORDER — TIZANIDINE 4 MG/1
TABLET ORAL
Qty: 10 TABLET | Refills: 2 | Status: SHIPPED | OUTPATIENT
Start: 2017-11-16 | End: 2018-05-11

## 2017-11-16 NOTE — TELEPHONE ENCOUNTER
----- Message from Leigh Ann Mccollum sent at 11/16/2017 12:49 PM CST -----  Contact: SELF [phone]  Pt requesting bone density results be mailed to her. Please advise

## 2017-11-30 NOTE — PROGRESS NOTES
This office note has been dictated.  HISTORY OF PRESENT ILLNESS:  This is a 72-year-old lady with hypertension,   dyslipidemia, hypothyroidism and others, following up on those issues.  She also   has a complaint today of some left-sided mid back pain.  She has had this pain   before and usually resolves over a week or so.  No recent trauma, no radiation   of the pain.  No rashes.  Recent lab data will be noted.  She denies any chest   pain, palpitations, syncope, cough or shortness of breath.  No claudication.    CURRENT MEDICATIONS:  All medications noted and reviewed in the electronic   medical record medication list.  Noted that the patient is off her rosuvastatin.    REVIEW OF SYSTEMS:  CONSTITUTIONAL:  No fever, no chills, no generalized body aches.  CARDIOVASCULAR:  No chest pain.  No palpitations.  RESPIRATORY:  No syncope.  GASTROINTESTINAL:  No nausea or vomiting.  No abdominal pain or diarrhea, change   in bowel habits, no bowel incontinence.  GENITOURINARY:  No urinary incontinence.    PAST MEDICAL HISTORY, PAST SURGICAL HISTORY, FAMILY MEDICAL HISTORY, AND SOCIAL   HISTORY:  All noted and reviewed in the electronic medical record history   sections.    PHYSICAL EXAMINATION:  GENERAL:  Alert, pleasant, appropriately groomed lady in no acute distress.  VITAL SIGNS:  All noted and reviewed as normal.  HEENT:  Normocephalic.  NECK:  Supple, no masses, no thyromegaly.  LUNGS:  Clear to auscultation.  CARDIOVASCULAR:  Regular rate and rhythm.  EXTREMITIES:  There is no significant murmur.  Carotids are full bilaterally   without bruits.  There is no peripheral extremity edema.  No ischemic changes of   lower extremity.  MUSCULOSKELETAL:  Back, no gross deformity.  The area of discomfort is the left   mid to lower thoracic paraspinous region.  There are no masses, rashes, or other   in this area.    DATA:  Lab data noted and reviewed from 11/09/2017.  Lipids not optimal.    IMPRESSION:  1.  Hypertension,  reasonably controlled.  2.  Dyslipidemia, currently off medication.  3.  Hypothyroidism, on replacement therapy.  4.  Musculoskeletal back pain.    PLAN:  1.  Pravastatin 40 mg at bedtime.  2.  In three months, check lipid profile, TSH, complete metabolic profile.  3.  Muscle relaxer, tizanidine 4 mg q. 8 hours p.r.n.  Heat and gradual   expectation regarding the back pain and advise us if without resolution.  4.  Discussed recent bone densitometry and osteopenia and the recommendation for   pharmacologic therapy.  She declines such at this time.  5.  Follow up in six months.      LISETTE/ANNABELLA  dd: 11/30/2017 10:13:59 (CST)  td: 12/01/2017 02:08:04 (CST)  Doc ID   #[id number]  Job ID #[id number]    CC:

## 2018-01-23 ENCOUNTER — TELEPHONE (OUTPATIENT)
Dept: INTERNAL MEDICINE | Facility: CLINIC | Age: 73
End: 2018-01-23

## 2018-01-23 RX ORDER — ESCITALOPRAM OXALATE 5 MG/1
5 TABLET ORAL DAILY
Qty: 30 TABLET | Refills: 6 | Status: SHIPPED | OUTPATIENT
Start: 2018-01-23 | End: 2018-03-01 | Stop reason: DRUGHIGH

## 2018-01-23 NOTE — TELEPHONE ENCOUNTER
----- Message from Rachelle Wilde sent at 1/23/2018  9:37 AM CST -----  Contact: Self Call [phone] or [phone]  Patient would like for you to give her call she having headaches. Patient would like to get medical advice.  Symptoms (please be specific):  Headaches  How long has patient had these symptoms:  For awhile.  Pharmacy name and phone #:  [facility] [phone] (Phone) or [phone] (Fax)  Any drug allergies:    Comments: / Minerva

## 2018-01-23 NOTE — TELEPHONE ENCOUNTER
"Spoke with pt who advises that she is having recurring pain in the back of her head and she believes it's her "nerves" and she has been crying as well.    She discarded the valium as they have  and she does not like taking the meds.    Pt advises that she use to see a HTN MD in her early 20's.    She recently underwent an MRI and has seen a neurologist for this issue.    Denies dizziness or blurred vision.    Pt obtained a BP while we were on the phone at 194/82, which she attributes to pain from the head pain.    Please review and advise of a medication that she can take on a daily basis to help her calm down (i.e, Xanax).    Thanks.  "

## 2018-01-23 NOTE — TELEPHONE ENCOUNTER
Spoke with pt to advise.    Verbalized understanding and scheduled f/u on 3/1/18 at 11:40 am.    Reminder mailed.

## 2018-01-24 ENCOUNTER — TELEPHONE (OUTPATIENT)
Dept: INTERNAL MEDICINE | Facility: CLINIC | Age: 73
End: 2018-01-24

## 2018-01-24 NOTE — TELEPHONE ENCOUNTER
----- Message from Daniela Weeks sent at 1/23/2018  4:41 PM CST -----  Contact: pt [phone]  Patient is returning a phone call.  Who left a message for the patient: Aydennader   Does patient know what this is regarding:a message was left    Comments:

## 2018-02-15 ENCOUNTER — LAB VISIT (OUTPATIENT)
Dept: LAB | Facility: HOSPITAL | Age: 73
End: 2018-02-15
Attending: INTERNAL MEDICINE
Payer: MEDICARE

## 2018-02-15 DIAGNOSIS — I10 ESSENTIAL HYPERTENSION: ICD-10-CM

## 2018-02-15 DIAGNOSIS — E78.2 MIXED HYPERLIPIDEMIA: ICD-10-CM

## 2018-02-15 DIAGNOSIS — E03.9 ACQUIRED HYPOTHYROIDISM: ICD-10-CM

## 2018-02-15 LAB
ALBUMIN SERPL BCP-MCNC: 3.8 G/DL
ALP SERPL-CCNC: 90 U/L
ALT SERPL W/O P-5'-P-CCNC: 11 U/L
ANION GAP SERPL CALC-SCNC: 10 MMOL/L
AST SERPL-CCNC: 13 U/L
BILIRUB SERPL-MCNC: 0.5 MG/DL
BUN SERPL-MCNC: 17 MG/DL
CALCIUM SERPL-MCNC: 9.7 MG/DL
CHLORIDE SERPL-SCNC: 105 MMOL/L
CHOLEST SERPL-MCNC: 252 MG/DL
CHOLEST/HDLC SERPL: 6.3 {RATIO}
CO2 SERPL-SCNC: 26 MMOL/L
CREAT SERPL-MCNC: 0.8 MG/DL
EST. GFR  (AFRICAN AMERICAN): >60 ML/MIN/1.73 M^2
EST. GFR  (NON AFRICAN AMERICAN): >60 ML/MIN/1.73 M^2
GLUCOSE SERPL-MCNC: 113 MG/DL
HDLC SERPL-MCNC: 40 MG/DL
HDLC SERPL: 15.9 %
LDLC SERPL CALC-MCNC: 149 MG/DL
NONHDLC SERPL-MCNC: 212 MG/DL
POTASSIUM SERPL-SCNC: 3.9 MMOL/L
PROT SERPL-MCNC: 7.5 G/DL
SODIUM SERPL-SCNC: 141 MMOL/L
T4 FREE SERPL-MCNC: 1.04 NG/DL
TRIGL SERPL-MCNC: 315 MG/DL
TSH SERPL DL<=0.005 MIU/L-ACNC: 0.23 UIU/ML

## 2018-02-15 PROCEDURE — 36415 COLL VENOUS BLD VENIPUNCTURE: CPT | Mod: PO

## 2018-02-15 PROCEDURE — 80053 COMPREHEN METABOLIC PANEL: CPT

## 2018-02-15 PROCEDURE — 84443 ASSAY THYROID STIM HORMONE: CPT

## 2018-02-15 PROCEDURE — 84439 ASSAY OF FREE THYROXINE: CPT

## 2018-02-15 PROCEDURE — 80061 LIPID PANEL: CPT

## 2018-03-01 ENCOUNTER — OFFICE VISIT (OUTPATIENT)
Dept: INTERNAL MEDICINE | Facility: CLINIC | Age: 73
End: 2018-03-01
Payer: MEDICARE

## 2018-03-01 VITALS
BODY MASS INDEX: 25.56 KG/M2 | OXYGEN SATURATION: 97 % | HEIGHT: 65 IN | WEIGHT: 153.44 LBS | SYSTOLIC BLOOD PRESSURE: 134 MMHG | TEMPERATURE: 98 F | RESPIRATION RATE: 16 BRPM | DIASTOLIC BLOOD PRESSURE: 84 MMHG | HEART RATE: 78 BPM

## 2018-03-01 DIAGNOSIS — I10 ESSENTIAL HYPERTENSION: Primary | ICD-10-CM

## 2018-03-01 DIAGNOSIS — E78.5 DYSLIPIDEMIA: ICD-10-CM

## 2018-03-01 DIAGNOSIS — F41.9 ANXIETY DISORDER, UNSPECIFIED TYPE: ICD-10-CM

## 2018-03-01 PROCEDURE — 99214 OFFICE O/P EST MOD 30 MIN: CPT | Mod: S$PBB,,, | Performed by: INTERNAL MEDICINE

## 2018-03-01 PROCEDURE — 99213 OFFICE O/P EST LOW 20 MIN: CPT | Mod: PBBFAC,PO | Performed by: INTERNAL MEDICINE

## 2018-03-01 PROCEDURE — 99999 PR PBB SHADOW E&M-EST. PATIENT-LVL III: CPT | Mod: PBBFAC,,, | Performed by: INTERNAL MEDICINE

## 2018-03-01 RX ORDER — LEVOTHYROXINE SODIUM 88 UG/1
88 TABLET ORAL DAILY
Qty: 90 TABLET | Refills: 3 | Status: SHIPPED | OUTPATIENT
Start: 2018-03-01 | End: 2018-08-14 | Stop reason: SDUPTHER

## 2018-03-01 RX ORDER — PRAVASTATIN SODIUM 10 MG/1
10 TABLET ORAL
Qty: 36 TABLET | Refills: 3 | Status: SHIPPED | OUTPATIENT
Start: 2018-03-02 | End: 2018-08-14 | Stop reason: SDUPTHER

## 2018-03-01 RX ORDER — LISINOPRIL AND HYDROCHLOROTHIAZIDE 12.5; 2 MG/1; MG/1
1 TABLET ORAL DAILY
Qty: 90 TABLET | Refills: 3 | Status: SHIPPED | OUTPATIENT
Start: 2018-03-01 | End: 2018-08-14 | Stop reason: SDUPTHER

## 2018-03-01 RX ORDER — ESCITALOPRAM OXALATE 10 MG/1
10 TABLET ORAL DAILY
Qty: 90 TABLET | Refills: 3 | Status: SHIPPED | OUTPATIENT
Start: 2018-03-01 | End: 2018-08-14 | Stop reason: SDUPTHER

## 2018-03-01 NOTE — PROGRESS NOTES
This office note has been dictated.  HISTORY OF PRESENT ILLNESS:  This is a 72-year-old lady with hypertension,   dyslipidemia and others, following up on blood pressure today, dyslipidemia and   also anxiety.  Recently, we started the patient on maintenance therapy with   Lexapro 5 mg daily.  She does report positive response, but feels like she can   advance the dose if appropriate.  Denies chest pain, palpitations, syncope,   cough or shortness of breath.  She did not tolerate the pravastatin because of   lower leg achiness.  She also had difficulties with rosuvastatin in the past.    CURRENT MEDICATIONS:  Noted and reviewed in the electronic medical record   medication list and adjusted.    REVIEW OF SYSTEMS:  CARDIOVASCULAR:  No chest pain, no palpitations, no syncope.  CARDIOVASCULAR:  No chest pain, no palpitations, no lower extremity edema.  GASTROINTESTINAL:  No nausea, vomiting, abdominal pain, diarrhea or change in   bowel habits.    PAST MEDICAL HISTORY, PAST SURGICAL HISTORY, FAMILY MEDICAL HISTORY AND SOCIAL   HISTORY:  All noted and reviewed and updated in the electronic medical record   history section.    PHYSICAL EXAMINATION:  GENERAL:  Alert, appropriately groomed lady, in no acute distress.  VITAL SIGNS:  Blood pressure taken manually by this examiner is 134/84.  Other   vital signs are normal.  HEENT:  Normocephalic.  NECK:  Supple.  No masses, no thyromegaly.  LUNGS:  Clear to auscultation.  CARDIOVASCULAR:  Regular rate and rhythm.  No significant murmur.  Carotids are   full bilaterally without bruits.  No peripheral extremity edema.    LABORATORY DATA:  Noted and reviewed from 02/15/2018.  Lipids reflect that she   has not been taking the statin medication.    IMPRESSION:  1.  Hypertension, controlled.  2.  Dyslipidemia, intolerance to standard doses of a couple of statins.  3.  Anxiety, improved on low-dose Lexapro.    PLAN:  1.  Increase Lexapro to 10 mg daily and give us a report in four  to six weeks.  2.  Trial of low-dose pravastatin 10 mg on Monday, Wednesday and Friday and   advise to follow up in six months.      LISETTE/ANNABELLA  dd: 03/01/2018 12:08:10 (CST)  td: 03/02/2018 09:19:22 (CST)  Doc ID   #[id number]  Job ID #[id number]    CC:

## 2018-03-26 ENCOUNTER — TELEPHONE (OUTPATIENT)
Dept: INTERNAL MEDICINE | Facility: CLINIC | Age: 73
End: 2018-03-26

## 2018-03-26 NOTE — TELEPHONE ENCOUNTER
----- Message from Floresita iXe sent at 3/26/2018  7:12 AM CDT -----  Contact: patient [phone]  Pt has had a problem with diarrhea for several months and is not improving. Pt said that as soon as she eats she has to run for the bathroom. Pt would like to speak with Evangelina today if possible . I have scheduled pt for Thursday wit /resident . Pt refused an appt with another doctor.

## 2018-03-26 NOTE — TELEPHONE ENCOUNTER
Spoke with pt to advise.    Verbalized understanding and states that she does not want to see a resident.    Agreeable to an appt with Dr. Menjivar on 3/27/18 at 9:20 am.

## 2018-03-27 ENCOUNTER — LAB VISIT (OUTPATIENT)
Dept: LAB | Facility: HOSPITAL | Age: 73
End: 2018-03-27
Attending: INTERNAL MEDICINE
Payer: MEDICARE

## 2018-03-27 ENCOUNTER — OFFICE VISIT (OUTPATIENT)
Dept: INTERNAL MEDICINE | Facility: CLINIC | Age: 73
End: 2018-03-27
Payer: MEDICARE

## 2018-03-27 ENCOUNTER — TELEPHONE (OUTPATIENT)
Dept: INTERNAL MEDICINE | Facility: CLINIC | Age: 73
End: 2018-03-27

## 2018-03-27 VITALS
BODY MASS INDEX: 25.35 KG/M2 | HEART RATE: 66 BPM | TEMPERATURE: 99 F | SYSTOLIC BLOOD PRESSURE: 164 MMHG | WEIGHT: 152.31 LBS | DIASTOLIC BLOOD PRESSURE: 60 MMHG | RESPIRATION RATE: 16 BRPM

## 2018-03-27 DIAGNOSIS — K52.9 CHRONIC DIARRHEA: Primary | ICD-10-CM

## 2018-03-27 DIAGNOSIS — R19.7 DIARRHEA: ICD-10-CM

## 2018-03-27 DIAGNOSIS — K52.9 CHRONIC DIARRHEA: ICD-10-CM

## 2018-03-27 LAB
ALBUMIN SERPL BCP-MCNC: 4.2 G/DL
ALP SERPL-CCNC: 98 U/L
ALT SERPL W/O P-5'-P-CCNC: 13 U/L
ANION GAP SERPL CALC-SCNC: 7 MMOL/L
AST SERPL-CCNC: 13 U/L
BASOPHILS # BLD AUTO: 0.07 K/UL
BASOPHILS NFR BLD: 0.8 %
BILIRUB SERPL-MCNC: 0.4 MG/DL
BUN SERPL-MCNC: 12 MG/DL
CALCIUM SERPL-MCNC: 10.1 MG/DL
CHLORIDE SERPL-SCNC: 103 MMOL/L
CO2 SERPL-SCNC: 30 MMOL/L
CREAT SERPL-MCNC: 0.8 MG/DL
DIFFERENTIAL METHOD: ABNORMAL
EOSINOPHIL # BLD AUTO: 0.1 K/UL
EOSINOPHIL NFR BLD: 0.7 %
ERYTHROCYTE [DISTWIDTH] IN BLOOD BY AUTOMATED COUNT: 12.7 %
ERYTHROCYTE [SEDIMENTATION RATE] IN BLOOD BY WESTERGREN METHOD: 17 MM/HR
EST. GFR  (AFRICAN AMERICAN): >60 ML/MIN/1.73 M^2
EST. GFR  (NON AFRICAN AMERICAN): >60 ML/MIN/1.73 M^2
GLUCOSE SERPL-MCNC: 121 MG/DL
HCT VFR BLD AUTO: 42.9 %
HGB BLD-MCNC: 14.2 G/DL
IMM GRANULOCYTES # BLD AUTO: 0.04 K/UL
IMM GRANULOCYTES NFR BLD AUTO: 0.4 %
LYMPHOCYTES # BLD AUTO: 3.2 K/UL
LYMPHOCYTES NFR BLD: 35.4 %
MCH RBC QN AUTO: 30 PG
MCHC RBC AUTO-ENTMCNC: 33.1 G/DL
MCV RBC AUTO: 91 FL
MONOCYTES # BLD AUTO: 0.5 K/UL
MONOCYTES NFR BLD: 5.3 %
NEUTROPHILS # BLD AUTO: 5.2 K/UL
NEUTROPHILS NFR BLD: 57.4 %
NRBC BLD-RTO: 0 /100 WBC
PLATELET # BLD AUTO: 398 K/UL
PMV BLD AUTO: 9.5 FL
POTASSIUM SERPL-SCNC: 4.2 MMOL/L
PROT SERPL-MCNC: 7.8 G/DL
RBC # BLD AUTO: 4.74 M/UL
SODIUM SERPL-SCNC: 140 MMOL/L
T4 FREE SERPL-MCNC: 1.11 NG/DL
TSH SERPL DL<=0.005 MIU/L-ACNC: 0.39 UIU/ML
WBC # BLD AUTO: 9.03 K/UL

## 2018-03-27 PROCEDURE — 99999 PR PBB SHADOW E&M-EST. PATIENT-LVL III: CPT | Mod: PBBFAC,,, | Performed by: INTERNAL MEDICINE

## 2018-03-27 PROCEDURE — 85651 RBC SED RATE NONAUTOMATED: CPT

## 2018-03-27 PROCEDURE — 99213 OFFICE O/P EST LOW 20 MIN: CPT | Mod: PBBFAC,PO | Performed by: INTERNAL MEDICINE

## 2018-03-27 PROCEDURE — 99213 OFFICE O/P EST LOW 20 MIN: CPT | Mod: S$PBB,,, | Performed by: INTERNAL MEDICINE

## 2018-03-27 PROCEDURE — 84443 ASSAY THYROID STIM HORMONE: CPT

## 2018-03-27 PROCEDURE — 36415 COLL VENOUS BLD VENIPUNCTURE: CPT | Mod: PO

## 2018-03-27 PROCEDURE — 85025 COMPLETE CBC W/AUTO DIFF WBC: CPT

## 2018-03-27 PROCEDURE — 80053 COMPREHEN METABOLIC PANEL: CPT

## 2018-03-27 PROCEDURE — 84439 ASSAY OF FREE THYROXINE: CPT

## 2018-03-27 NOTE — TELEPHONE ENCOUNTER
----- Message from Minerva Peralta sent at 3/27/2018  2:01 PM CDT -----  Contact: Pt [phone]  Patient would like to get a referral.  Does the patient already have the specialty clinic appointment scheduled:  No  If yes, what date is the appointment scheduled:   N/a  Referral to what specialty:  Gastroenterologist.  Reason (be specific):  Dr. Menjivar recommended that the patient be seen.   Does the patient want the referral with a specific physician:  No  Is this an [facility]:  Patient does not know.  Comments:  Patient said she does not want to be seen by Dr. Stephanie Lloyd, she said that she would like to see a regular doctor. Patient want to know if the doctor that she sees will take her Medicare or [place] insurance?

## 2018-03-27 NOTE — TELEPHONE ENCOUNTER
Patient was notified that Dr. Menjivar recommended Dr. Luis Wilkinson if he wasn't available , next available would be fine.

## 2018-03-28 ENCOUNTER — LAB VISIT (OUTPATIENT)
Dept: LAB | Facility: HOSPITAL | Age: 73
End: 2018-03-28
Attending: INTERNAL MEDICINE
Payer: MEDICARE

## 2018-03-28 DIAGNOSIS — K52.9 CHRONIC DIARRHEA: ICD-10-CM

## 2018-03-28 PROCEDURE — 87045 FECES CULTURE AEROBIC BACT: CPT

## 2018-03-28 PROCEDURE — 87449 NOS EACH ORGANISM AG IA: CPT

## 2018-03-28 PROCEDURE — 87427 SHIGA-LIKE TOXIN AG IA: CPT | Mod: 59

## 2018-03-28 PROCEDURE — 87209 SMEAR COMPLEX STAIN: CPT

## 2018-03-28 PROCEDURE — 87329 GIARDIA AG IA: CPT

## 2018-03-28 PROCEDURE — 89055 LEUKOCYTE ASSESSMENT FECAL: CPT

## 2018-03-28 PROCEDURE — 87046 STOOL CULTR AEROBIC BACT EA: CPT

## 2018-03-29 ENCOUNTER — OFFICE VISIT (OUTPATIENT)
Dept: GASTROENTEROLOGY | Facility: CLINIC | Age: 73
End: 2018-03-29
Payer: MEDICARE

## 2018-03-29 VITALS
HEIGHT: 65 IN | WEIGHT: 150.81 LBS | SYSTOLIC BLOOD PRESSURE: 138 MMHG | HEART RATE: 68 BPM | BODY MASS INDEX: 25.13 KG/M2 | DIASTOLIC BLOOD PRESSURE: 58 MMHG

## 2018-03-29 DIAGNOSIS — R19.7 DIARRHEA, UNSPECIFIED TYPE: Primary | ICD-10-CM

## 2018-03-29 LAB
C DIFF GDH STL QL: NEGATIVE
C DIFF TOX A+B STL QL IA: NEGATIVE
CRYPTOSP AG STL QL IA: NEGATIVE
G LAMBLIA AG STL QL IA: NEGATIVE
O+P STL TRI STN: NORMAL
WBC #/AREA STL HPF: NORMAL /[HPF]

## 2018-03-29 PROCEDURE — 99204 OFFICE O/P NEW MOD 45 MIN: CPT | Mod: S$PBB,,, | Performed by: INTERNAL MEDICINE

## 2018-03-29 PROCEDURE — 99999 PR PBB SHADOW E&M-EST. PATIENT-LVL III: CPT | Mod: PBBFAC,,, | Performed by: INTERNAL MEDICINE

## 2018-03-29 PROCEDURE — 99213 OFFICE O/P EST LOW 20 MIN: CPT | Mod: PBBFAC | Performed by: INTERNAL MEDICINE

## 2018-03-29 RX ORDER — DICYCLOMINE HYDROCHLORIDE 10 MG/1
10 CAPSULE ORAL NIGHTLY
Qty: 30 CAPSULE | Refills: 1 | Status: SHIPPED | OUTPATIENT
Start: 2018-03-29 | End: 2018-04-18 | Stop reason: SDUPTHER

## 2018-03-29 NOTE — LETTER
March 29, 2018      Fernando Menjivar MD  [address] [place] [address]           [facility]  [address]  [place] [address]  Phone: [phone]  Fax: [phone]          Patient: Elizabeth Gleason   MR Number: [id number]   YOB: 1945   Date of Visit: 3/29/2018       Dear Dr. Fernando Menjivar:    Thank you for referring Elizabeth Gleason to me for evaluation. Attached you will find relevant portions of my assessment and plan of care.    If you have questions, please do not hesitate to call me. I look forward to following Elizabeth Gleason along with you.    Sincerely,    Christopher Yan MD    Enclosure  CC:  No Recipients    If you would like to receive this communication electronically, please contact [email or web address] or [phone] to request more information on [employer] access.    For providers and/or their staff who would like to refer a patient to [facility], please contact us through our one-stop-shop provider referral line, [facility], at [phone].    If you feel you have received this communication in error or would no longer like to receive these types of communications, please e-mail [email or web address]

## 2018-03-29 NOTE — PROGRESS NOTES
REASON FOR VISIT:  Postprandial diarrhea and fecal urgency.    HISTORY OF PRESENT ILLNESS:  Ms. Elizabeth Gleason is a 72-year-old who has had the   above issues for more than a year now and previously has been worked up by Dr. Lui in Colorectal Surgery when she was found to have good anal tone, no   rectocele, grade I internal hemorrhoids and a defecogram was done, which was   normal.  A previous colonoscopy was normal as well.  No biopsies were, however,   done.  She currently complains of no bowel movements after breakfast, which is   usually small volume and after her lunch and supper, she usually has significant   issues with bowel movements 3 to 4 times or sometimes up to 5 times, loose and   watery.  She denies any nausea, vomiting, weight loss, blood in the stool or   mucus.  No fevers or chills.  No abdominal pain.  She does feel a little crampy   with borborygmi around the time of bowel movement.  Otherwise, no major fecal   incontinence issues recently.    PAST MEDICAL, SURGICAL, SOCIAL AND FAMILY HISTORY:  Reviewed.    MEDICATIONS AND ALLERGIES:  Reviewed.    REVIEW OF SYSTEMS:  CONSTITUTIONAL:  No fever.  No chills.  No weight loss.  Appetite is normal.  EYES:  No visual changes.  ENT:  No odynophagia or hoarseness of voice.  CARDIOVASCULAR:  No angina or palpitations.  RESPIRATORY:  No shortness of breath or wheezing.  GENITOURINARY:  No dysuria, frequency or stress incontinence.  MUSCULOSKELETAL:  No myalgia or arthralgia.  SKIN:  No pruritus or eczema.  NEUROLOGIC:  No headache.  No seizures.  PSYCHIATRIC:  Complains of anxiety.  No depression.  GASTROINTESTINAL:  See HPI.    PHYSICAL EXAMINATION:  VITAL SIGNS:  See EPIC.  GENERAL:  Awake, alert and oriented x3, in no acute distress.  NECK:  Supple.  No carotid bruits.  No cervical adenopathy.  ABDOMEN:  Slightly obese, soft, nondistended and nontender.  No masses palpable.    No hepatosplenomegaly appreciated.  Bowel sounds are normal.  EYES:   Conjunctivae are anicteric.  ENT:  Oropharynx, mucosa moist.  CARDIOVASCULAR:  S1 and S2 normal.  RESPIRATORY:  Bilateral air entry equal.  SKIN:  No palmar erythema or spider angiomata.  NEUROLOGIC:  No asterixis.  PSYCHIATRY:  Affect is appropriate.  LOWER EXTREMITY:  No pedal edema.    IMPRESSION:  Postprandial urgency, usually after a large meal with episodes of   urge fecal incontinence.    RECOMMENDATIONS:  Start Bentyl 10 mg nightly and if after a week no improvement,   she will change the timing of the medicine to half an hour before her lunch and   follow up in a month.  If no improvement, we will either consider increasing   the dose or transition to Librax and consider repeat colonoscopy with biopsies.      ACT/IN  dd: 03/29/2018 16:04:58 (CDT)  td: 03/30/2018 13:25:03 (CDT)  Doc ID   #[id number]  Job ID #[id number]    CC:

## 2018-04-01 LAB
E COLI SXT1 STL QL IA: NEGATIVE
E COLI SXT2 STL QL IA: NEGATIVE

## 2018-04-02 LAB — BACTERIA STL CULT: NORMAL

## 2018-04-05 NOTE — PROGRESS NOTES
Subjective:       Patient ID: Elizabeth Gleason is a 72 y.o. female.    Chief Complaint: Diarrhea (hx.diverticulitis) and Rectal Pain    HPI   The patient has been experiencing intermittent symptoms of diarrhea for the past several months.  She describes bowel movements as multiple watery brown stools.  Mild midabdominal discomfort is noted.  She also complains of pressure in the anal area and occasionally sees spots of blood on the toilet paper after passing the stool.  Occasional fecal incontinence is noted.  She reports that the symptoms were present before her colonoscopy was performed in 8/17.  She has not appreciated any intolerance to dairy, cholesterol medication, or fish oil supplements.  Imodium appears to help.  Her last antibiotics were over a year ago.  Her last colonoscopy was performed on 8/25/17 and showed sigmoid diverticulosis but no polyps.  There was no family history of colon cancer or inflammatory bowel disease.    Review of Systems   Constitutional: Negative for activity change, appetite change, fatigue and unexpected weight change.   Eyes: Negative for visual disturbance.   Respiratory: Negative for cough and shortness of breath.    Cardiovascular: Negative for chest pain, palpitations and leg swelling.   Gastrointestinal: Positive for abdominal pain, anal bleeding, diarrhea and rectal pain. Negative for abdominal distention, blood in stool, constipation, nausea and vomiting.   Genitourinary: Negative for dysuria and hematuria.   Musculoskeletal: Negative for arthralgias, neck pain and neck stiffness.   Skin: Negative for rash.   Neurological: Negative for dizziness, syncope and headaches.   Psychiatric/Behavioral: Negative for sleep disturbance.       Objective:      Physical Exam   Constitutional: She is oriented to person, place, and time. She appears well-developed and well-nourished. No distress.   HENT:   Head: Normocephalic and atraumatic.   Eyes: Conjunctivae are normal. No  scleral icterus.   Neck: Neck supple. No JVD present. No thyromegaly present.   Cardiovascular: Normal rate, regular rhythm, normal heart sounds and intact distal pulses.  Exam reveals no gallop and no friction rub.    No murmur heard.  Pulmonary/Chest: Effort normal and breath sounds normal. No respiratory distress. She has no wheezes. She has no rales.   Abdominal: Soft. Bowel sounds are normal. She exhibits no mass. There is no tenderness.   Musculoskeletal: Normal range of motion. She exhibits no tenderness.   Lymphadenopathy:     She has no cervical adenopathy.   Neurological: She is alert and oriented to person, place, and time.   Skin: Skin is warm and dry. No rash noted.   Psychiatric: She has a normal mood and affect. Her behavior is normal.   Nursing note and vitals reviewed.      Assessment:       1. Chronic diarrhea        Plan:           Elizabeth was seen today for diarrhea.  GI consultation will be obtained regarding chronic diarrhea.  Stool studies will be obtained.  Blood tests will be drawn today.  The patient is to return to clinic as needed.    Diagnoses and all orders for this visit:    Chronic diarrhea  -     Stool Exam-Ova,Cysts,Parasites; Future  -     Stool culture; Future  -     Giardia / Cryptosporidum, EIA; Future  -     WBC, Stool; Future  -     Clostridium difficile EIA; Future  -     CBC auto differential; Future  -     Sedimentation rate, manual; Future  -     Comprehensive metabolic panel; Future  -     TSH; Future  -     Ambulatory consult to Gastroenterology    Diarrhea   -     TSH; Future

## 2018-04-16 ENCOUNTER — TELEPHONE (OUTPATIENT)
Dept: GASTROENTEROLOGY | Facility: CLINIC | Age: 73
End: 2018-04-16

## 2018-04-16 NOTE — TELEPHONE ENCOUNTER
Dr. Yan,  Patient states the bentyl is not working , patient states after she eats she has diarrhea , patient states she's unable to control her diarrhea,and she use the bathroom on herself 4 times. she would like to know if something else can be called in   Please advise

## 2018-04-16 NOTE — TELEPHONE ENCOUNTER
----- Message from Sarah Del Rio sent at 4/16/2018  8:01 AM CDT -----  Contact: pt#[phone]  Pt states that BENTYL is not working. Please call

## 2018-04-18 RX ORDER — DICYCLOMINE HYDROCHLORIDE 10 MG/1
10 CAPSULE ORAL 2 TIMES DAILY
Qty: 60 CAPSULE | Refills: 1 | Status: SHIPPED | OUTPATIENT
Start: 2018-04-18 | End: 2018-05-18

## 2018-04-19 ENCOUNTER — TELEPHONE (OUTPATIENT)
Dept: GASTROENTEROLOGY | Facility: CLINIC | Age: 73
End: 2018-04-19

## 2018-04-19 NOTE — TELEPHONE ENCOUNTER
----- Message from Siomara New sent at 4/19/2018 11:44 AM CDT -----  Contact: Pt  Pt would like prescription dicyclomine (BENTYL) 10 MG capsule,  Per Pt  pharmacy never received orders and she needs RX before 4/21/2018 is her last pill.    Pt can be reached at [phone] or [phone]

## 2018-04-19 NOTE — TELEPHONE ENCOUNTER
Dr. Yan ,  Patient states medication was sent to mail pharmacy, spoke with Matias at [facility] to have medication switch over

## 2018-04-19 NOTE — TELEPHONE ENCOUNTER
Ma spoke with pt and informed her the medication was called in to [facility] and she can go and pick it up  Pt verbalized understanding

## 2018-04-19 NOTE — TELEPHONE ENCOUNTER
----- Message from Marie Wilkinson sent at 4/19/2018  1:26 PM CDT -----  Contact: Self   Pt missed a phone call from the nurse and would like a call back at her earliest convenience         Pt can be contacted at [phone]

## 2018-04-20 ENCOUNTER — OFFICE VISIT (OUTPATIENT)
Dept: OBSTETRICS AND GYNECOLOGY | Facility: CLINIC | Age: 73
End: 2018-04-20
Payer: MEDICARE

## 2018-04-20 VITALS
BODY MASS INDEX: 25.49 KG/M2 | DIASTOLIC BLOOD PRESSURE: 70 MMHG | HEIGHT: 65 IN | SYSTOLIC BLOOD PRESSURE: 120 MMHG | WEIGHT: 153 LBS

## 2018-04-20 DIAGNOSIS — M85.80 OSTEOPENIA, UNSPECIFIED LOCATION: ICD-10-CM

## 2018-04-20 DIAGNOSIS — Z01.419 WELL FEMALE EXAM WITH ROUTINE GYNECOLOGICAL EXAM: Primary | ICD-10-CM

## 2018-04-20 DIAGNOSIS — R92.8 ABNORMAL MAMMOGRAM: ICD-10-CM

## 2018-04-20 PROCEDURE — G0101 CA SCREEN;PELVIC/BREAST EXAM: HCPCS | Mod: PBBFAC,PO | Performed by: OBSTETRICS & GYNECOLOGY

## 2018-04-20 PROCEDURE — G0101 CA SCREEN;PELVIC/BREAST EXAM: HCPCS | Mod: S$PBB,,, | Performed by: OBSTETRICS & GYNECOLOGY

## 2018-04-20 PROCEDURE — 99213 OFFICE O/P EST LOW 20 MIN: CPT | Mod: PBBFAC,PO,25 | Performed by: OBSTETRICS & GYNECOLOGY

## 2018-04-20 PROCEDURE — 99999 PR PBB SHADOW E&M-EST. PATIENT-LVL III: CPT | Mod: PBBFAC,,, | Performed by: OBSTETRICS & GYNECOLOGY

## 2018-04-20 NOTE — PROGRESS NOTES
SUBJECTIVE:   72 y.o. female   for routine gyn exam. No LMP recorded. Patient is postmenopausal..  She has no unusual complaints.  No PMB.  No HRT.  H/o osteopenia.  Used to take Actonel for 5 years, then stopped.  H/o breast mass on MMG/ u/s .  Needs f/u MMG 6 months afterwards.         Past Medical History:   Diagnosis Date    ALLERGIC RHINITIS     Carotid artery narrowing     rt worse than the left    HEARING LOSS     Hyperlipidemia     Hypertension     Hypothyroid     Impaired fasting glucose     Menopause present     Migraine headache     Osteopenia     Tuberculosis     childhood TB     Past Surgical History:   Procedure Laterality Date    COLONOSCOPY N/A 2017    Procedure: COLONOSCOPY;  Surgeon: Keny Lui MD;  [facility] ([address] [place] [address]);  Service: Endoscopy;  Laterality: N/A;    left 5th toe surgery      rt ear surgery      rt lung lobectomy and a rib removed      secondary to TB    TUBAL LIGATION       Social History     Social History    Marital status:      Spouse name: N/A    Number of children: N/A    Years of education: N/A     Occupational History    retired teller      Social History Main Topics    Smoking status: Never Smoker    Smokeless tobacco: Never Used    Alcohol use 0.0 oz/week      Comment: occasionally    Drug use: No    Sexual activity: No     Other Topics Concern    Not on file     Social History Narrative    She exercises regularly.     Family History   Problem Relation Age of Onset    Heart disease Mother      cabg    Kidney disease Mother      was on dialysis    Stroke Father     Cancer Brother      lung cancer    Colon cancer Maternal Aunt     Breast cancer Cousin      paternal FIRST cousin    Ovarian cancer Neg Hx      OB History    Para Term  AB Living   1 1 1         SAB TAB Ectopic Multiple Live Births                  # Outcome Date GA Lbr Sukhi/2nd Weight Sex Delivery Anes PTL Lv   1 Term                      Current Outpatient Prescriptions   Medication Sig Dispense Refill    ascorbic acid (VITAMIN C) 250 MG tablet Take 250 mg by mouth once daily.      aspirin (ECOTRIN) 81 MG EC tablet Take 81 mg by mouth once daily.      blood sugar diagnostic Strp 1 freestyle lite test strip weekly 50 strip 11    calcium-vitamin D 500-125 mg-unit tablet Take 1 tablet by mouth 2 (two) times daily.        cranberry 400 mg Cap Take by mouth.      diazePAM (VALIUM) 5 MG tablet Take 1 tablet (5 mg total) by mouth every 12 (twelve) hours as needed. 60 tablet 1    dicyclomine (BENTYL) 10 MG capsule Take 1 capsule (10 mg total) by mouth 2 (two) times daily. 60 capsule 1    escitalopram oxalate (LEXAPRO) 10 MG tablet Take 1 tablet (10 mg total) by mouth once daily. 90 tablet 3    fexofenadine (ALLEGRA) 180 MG tablet Take 1 tablet (180 mg total) by mouth once daily. 90 tablet 3    fish oil-omega-3 fatty acids 300-1,000 mg capsule Take 2 g by mouth once daily.      levothyroxine (SYNTHROID) 88 MCG tablet Take 1 tablet (88 mcg total) by mouth once daily. 90 tablet 3    lisinopril-hydrochlorothiazide (PRINZIDE,ZESTORETIC) 20-12.5 mg per tablet Take 1 tablet by mouth once daily. 90 tablet 3    neomycin-polymyxin-hydrocortisone (CORTISPORIN) otic solution INSTILL 4 DROPS FOUR TIMES DAILY IN AFFECTED EAR 10 mL 1    omeprazole (PRILOSEC) 20 MG capsule Take 1 capsule (20 mg total) by mouth once daily. 90 capsule 3    pravastatin (PRAVACHOL) 10 MG tablet Take 1 tablet (10 mg total) by mouth every Mon, Wed, Fri. 36 tablet 3    tiZANidine (ZANAFLEX) 4 MG tablet 1 po q hs prn as a muscle relaxer. 10 tablet 2    UBIDECARENONE (CO Q-10 ORAL) Take by mouth.       No current facility-administered medications for this visit.      Allergies: Levaquin [levofloxacin] and Tramadol     ROS:  Constitutional: no weight loss, weight gain, fever, fatigue  Eyes:  No vision changes, glasses/contacts  ENT/Mouth: No ulcers, sinus problems, ears  "ringing, headache  Cardiovascular: No inability to lie flat, chest pain, exercise intolerance, swelling, heart palpitations  Respiratory: No wheezing, coughing blood, shortness of breath, or cough  Gastrointestinal: No diarrhea, bloody stool, nausea/vomiting, constipation, gas, hemorrhoids  Genitourinary: No blood in urine, painful urination, urgency of urination, frequency of urination, incomplete emptying, incontinence, abnormal bleeding, painful periods, heavy periods, vaginal discharge, vaginal odor, painful intercourse, sexual problems, bleeding after intercourse.  Musculoskeletal: No muscle weakness  Skin/Breast: No painful breasts, nipple discharge, masses, rash, ulcers  Neurological: No passing out, seizures, numbness, headache  Endocrine: No diabetes, hypothyroid, hyperthyroid, hot flashes, hair loss, abnormal hair growth, ance  Psychiatric: No depression, crying  Hematologic: No bruises, bleeding, swollen lymph nodes, anemia.      OBJECTIVE:   The patient appears well, alert, oriented x 3, in no distress.  /70 (BP Location: Right arm, Patient Position: Sitting, BP Method: Medium (Manual))   Ht 5' 5" (1.651 m)   Wt 69.4 kg (153 lb)   BMI 25.46 kg/m²   NECK: no thyromegaly, trachea midline  SKIN: no acne, striae, hirsutism  CHEST: CTAB  CV: RRR  BREAST EXAM: breasts appear normal, no suspicious masses, no skin or nipple changes or axillary nodes  ABDOMEN: no hernias, masses, or hepatosplenomegaly  GENITALIA: normal external genitalia, no erythema, no discharge  URETHRA: normal urethra, normal urethral meatus  VAGINA: Normal  CERVIX: no lesions or cervical motion tenderness  UTERUS: normal size, contour, position, consistency, mobility, non-tender  ADNEXA: no mass, fullness, tenderness      ASSESSMENT:   1. Well female exam with routine gynecological exam     2. Abnormal mammogram     3. Osteopenia, unspecified location         PLAN:       Follow up MMG  Discussed osteopenia, mgt options  Return to " clinic in 1 year

## 2018-05-07 ENCOUNTER — HOSPITAL ENCOUNTER (OUTPATIENT)
Dept: RADIOLOGY | Facility: HOSPITAL | Age: 73
Discharge: HOME | End: 2018-05-07
Attending: INTERNAL MEDICINE
Payer: MEDICARE

## 2018-05-07 DIAGNOSIS — R92.8 ABNORMAL MAMMOGRAM: ICD-10-CM

## 2018-05-07 PROCEDURE — 77065 DX MAMMO INCL CAD UNI: CPT | Mod: TC,PO

## 2018-05-07 PROCEDURE — 77065 DX MAMMO INCL CAD UNI: CPT | Mod: 26,,, | Performed by: RADIOLOGY

## 2018-05-07 PROCEDURE — 77061 BREAST TOMOSYNTHESIS UNI: CPT | Mod: TC,PO

## 2018-05-07 PROCEDURE — 77061 BREAST TOMOSYNTHESIS UNI: CPT | Mod: 26,,, | Performed by: RADIOLOGY

## 2018-05-11 ENCOUNTER — OFFICE VISIT (OUTPATIENT)
Dept: GASTROENTEROLOGY | Facility: CLINIC | Age: 73
End: 2018-05-11
Payer: MEDICARE

## 2018-05-11 VITALS
DIASTOLIC BLOOD PRESSURE: 60 MMHG | HEIGHT: 65 IN | SYSTOLIC BLOOD PRESSURE: 141 MMHG | WEIGHT: 154.56 LBS | BODY MASS INDEX: 25.75 KG/M2 | HEART RATE: 58 BPM

## 2018-05-11 DIAGNOSIS — R19.7 DIARRHEA, UNSPECIFIED TYPE: Primary | ICD-10-CM

## 2018-05-11 PROCEDURE — 99213 OFFICE O/P EST LOW 20 MIN: CPT | Mod: PBBFAC | Performed by: INTERNAL MEDICINE

## 2018-05-11 PROCEDURE — 99999 PR PBB SHADOW E&M-EST. PATIENT-LVL III: CPT | Mod: PBBFAC,,, | Performed by: INTERNAL MEDICINE

## 2018-05-11 PROCEDURE — 99214 OFFICE O/P EST MOD 30 MIN: CPT | Mod: S$PBB,,, | Performed by: INTERNAL MEDICINE

## 2018-05-11 NOTE — LETTER
May 11, 2018      CHAPINCITO Reyes MD  [address] [place] [address]           Frank kimberli - Gastroenterology  [address]  [facility] [address]  Phone: [phone]  Fax: [phone]          Patient: Elizabeth Gleason   MR Number: [id number]   YOB: 1945   Date of Visit: 5/11/2018       Dear Dr. CHAPINCITO Reyes:    Thank you for referring Elizabeth Gleason to me for evaluation. Attached you will find relevant portions of my assessment and plan of care.    If you have questions, please do not hesitate to call me. I look forward to following Elizabeth Gleason along with you.    Sincerely,    Christopher Yan MD    Enclosure  CC:  No Recipients    If you would like to receive this communication electronically, please contact [email or web address] or [phone] to request more information on [facility] access.    For providers and/or their staff who would like to refer a patient to [facility], please contact us through our one-stop-shop provider referral line, [facility], at [phone].    If you feel you have received this communication in error or would no longer like to receive these types of communications, please e-mail [email or web address]

## 2018-05-11 NOTE — PROGRESS NOTES
REASON FOR VISIT:  Postprandial urgency, usually after a large meal.    HISTORY OF PRESENT ILLNESS:  Ms. Gleason is a 72-year-old who was last seen on   03/29/2018 with a longstanding history of postprandial urgency with occasional   urge fecal incontinence who was asked to start taking Bentyl 10 mg nightly and   thereafter switch it to half an hour before lunch, but she has not noticed any   improvement since doing that.  Prior workup has included a colonoscopy without   biopsy, defecogram, CT scan of the abdomen and stool for infection and they have   all been negative.  Her thyroid function also is normal.  Denies any weight   loss.  Appetite is normal.  No blood in the stool.  She has noticed Imodium does   seem to work and today we discussed about scheduling Imodium one every other   day and to start taking probiotic, i.e., FAD ? IO daily.  We also   discussed about testing stool for pancreatic elastase to evaluate for exocrine   pancreatic insufficiency; otherwise, no new complaints.    PAST MEDICAL, SURGICAL, SOCIAL AND FAMILY HISTORY:  Reviewed.    MEDICATIONS AND ALLERGIES:  Reviewed.    REVIEW OF SYSTEMS:  CONSTITUTIONAL:  No fever, no chills, no weight loss.  Appetite is normal.  EYES:  No visual changes.  ENT:  No odynophagia or hoarseness of voice.  CARDIOVASCULAR:  No angina or palpitation.  RESPIRATORY:  No shortness of breath or wheezing.  GENITOURINARY:  No dysuria or frequency.  MUSCULOSKELETAL:  No myalgia, no arthralgia.  SKIN:  No pruritus or eczema.  NEUROLOGIC:  No headache, no seizure.  PSYCHIATRY:  No depression but complains of anxiety.  GASTROINTESTINAL:  See HPI.    PHYSICAL EXAMINATION:  VITAL SIGNS:  See EPIC.  GENERAL:  Awake, alert and oriented x3, in no acute distress.  About 25 minutes spent with the patient more than 50% in counseling regarding   workup and treatment options.    IMPRESSION:  Intermittent fecal urge incontinence.    RECOMMENDATIONS:  1.  Start [facility]on [facility] daily.  2.  Imodium one every other day.  3.  Check pancreatic fecal elastase.  4.  Follow up in GI Clinic in three months.      ACT/HN  dd: 05/11/2018 13:58:01 (CDT)  td: 05/11/2018 23:11:14 (CDT)  Doc ID   #[id number]  Job ID #[id number]    CC:

## 2018-05-15 ENCOUNTER — LAB VISIT (OUTPATIENT)
Dept: LAB | Facility: HOSPITAL | Age: 73
End: 2018-05-15
Attending: INTERNAL MEDICINE
Payer: MEDICARE

## 2018-05-15 DIAGNOSIS — R19.7 DIARRHEA, UNSPECIFIED TYPE: ICD-10-CM

## 2018-05-15 PROCEDURE — 82656 EL-1 FECAL QUAL/SEMIQ: CPT

## 2018-05-22 ENCOUNTER — TELEPHONE (OUTPATIENT)
Dept: GASTROENTEROLOGY | Facility: CLINIC | Age: 73
End: 2018-05-22

## 2018-05-22 LAB
ELASTASE 1, FECAL: 261.9 UG E/G STOOL
ELASTASE INTERPRETATION: NORMAL

## 2018-05-22 RX ORDER — LANCETS
EACH MISCELLANEOUS
Qty: 50 EACH | Refills: 11 | Status: SHIPPED | OUTPATIENT
Start: 2018-05-22 | End: 2018-05-29 | Stop reason: SDUPTHER

## 2018-05-22 NOTE — TELEPHONE ENCOUNTER
----- Message from Sarah Hu sent at 5/21/2018  4:34 PM CDT -----  Contact: self/[phone]  Diabetic or Medical Supplies.  What supplies are needed: meter,lancets,strips  What is the brand name of the supplies: Free Style Freedom Lite   Is this a refill or new prescription: new  Who prescribed the supplies:    Pharmacy or company name, phone # and location:  [facility] [phone] (Phone)  [phone] (Fax)  Comments:

## 2018-05-22 NOTE — TELEPHONE ENCOUNTER
----- Message from Christopher Yan MD sent at 5/22/2018  9:59 AM CDT -----  Stool test reveals normal pancreas function.

## 2018-05-23 ENCOUNTER — TELEPHONE (OUTPATIENT)
Dept: INTERNAL MEDICINE | Facility: CLINIC | Age: 73
End: 2018-05-23

## 2018-05-23 NOTE — TELEPHONE ENCOUNTER
----- Message from Zhen Coronado sent at 5/23/2018 11:05 AM CDT -----  Contact: Patient [phone]  Patient stating the Diabetes  Machine is broken, just checking the status of the Equipment to see if was sent to pharmacy.    Pharmacy: [facility] [phone] (Phone) [phone] (Fax)    Please call and advise  Thank you

## 2018-05-23 NOTE — TELEPHONE ENCOUNTER
----- Message from Apolonia Zheng sent at 5/22/2018  4:52 PM CDT -----  Contact: pt [phone] or [phone]  Pt requests to get orders put in for a new gluco meter because hers is broken. Please advise.

## 2018-05-24 ENCOUNTER — TELEPHONE (OUTPATIENT)
Dept: INTERNAL MEDICINE | Facility: CLINIC | Age: 73
End: 2018-05-24

## 2018-05-24 DIAGNOSIS — E11.9 DIABETES MELLITUS WITHOUT COMPLICATION: Primary | ICD-10-CM

## 2018-05-24 NOTE — TELEPHONE ENCOUNTER
----- Message from Suma Argueta sent at 5/24/2018 12:57 PM CDT -----  Contact: patient- [phone]  Diabetic or Medical Supplies.  What supplies are needed: meter, strips and needles  What is the brand name of the supplies: freestyle freedom light  Is this a refill or new prescription:  New Rx  Who prescribed the supplies:    Pharmacy or company name, phone # and location:  [facility]  [phone] (Phone)  [phone] (Fax)  Comments:  Patient would like to know if another meter can be ordered. Please call to advise.

## 2018-05-29 RX ORDER — LANCETS
EACH MISCELLANEOUS
Qty: 50 EACH | Refills: 11 | Status: SHIPPED | OUTPATIENT
Start: 2018-05-29 | End: 2023-04-25

## 2018-05-29 RX ORDER — INSULIN PUMP SYRINGE, 3 ML
EACH MISCELLANEOUS
Qty: 1 EACH | Refills: 0 | Status: SHIPPED | OUTPATIENT
Start: 2018-05-29 | End: 2023-04-25

## 2018-05-29 NOTE — TELEPHONE ENCOUNTER
----- Message from Minerva Peralta sent at 5/29/2018 10:10 AM CDT -----  Contact: Pt [phone]  Patient is calling in regards to her diabetic meter that is broken she said that someone put in an order for her already but the company [employer] does not carry the script for the free style freedom life meter. Patient would like you to send the order to her pharmacy [facility]/pharmacy #[id number] - [place], [place] - [address] fax# [phone] Phone # [phone].

## 2018-05-29 NOTE — TELEPHONE ENCOUNTER
Pt is requesting that the diabetic supplies are sent to [facility] as [facility] does not carry such.    Please send as loaded.    Thanks.

## 2018-06-25 ENCOUNTER — TELEPHONE (OUTPATIENT)
Dept: INTERNAL MEDICINE | Facility: CLINIC | Age: 73
End: 2018-06-25

## 2018-06-25 DIAGNOSIS — E11.9 DIABETES MELLITUS WITHOUT COMPLICATION: Primary | ICD-10-CM

## 2018-06-25 NOTE — TELEPHONE ENCOUNTER
----- Message from Minerva Peralta sent at 6/25/2018 11:07 AM CDT -----  Contact: Pt [phone]  Doctor appointment and lab have been scheduled.  Please link lab orders to the lab appointment.  Date of doctor appointment:  08/14/2018  Physical or EP:  Ep   Date of lab appointment:  08/07/2018  Comments:

## 2018-07-10 ENCOUNTER — TELEPHONE (OUTPATIENT)
Dept: INTERNAL MEDICINE | Facility: CLINIC | Age: 73
End: 2018-07-10

## 2018-07-10 NOTE — TELEPHONE ENCOUNTER
----- Message from Katlin Lundberg sent at 7/9/2018  9:10 AM CDT -----  Contact: Self [phone] or [phone]  Patient is returning a phone call.  Who left a message for the patient: BRYN MONTESINOS  Does patient know what this is regarding:  N/A  Comments:

## 2018-07-10 NOTE — TELEPHONE ENCOUNTER
As per pt's EMR, our office never called her, however, she called to have lab orders attached to her upcoming appt, which has been done.    See previous message.

## 2018-07-13 ENCOUNTER — TELEPHONE (OUTPATIENT)
Dept: GASTROENTEROLOGY | Facility: CLINIC | Age: 73
End: 2018-07-13

## 2018-07-13 RX ORDER — DICYCLOMINE HYDROCHLORIDE 10 MG/1
10 CAPSULE ORAL 3 TIMES DAILY PRN
Qty: 90 CAPSULE | Refills: 0 | Status: SHIPPED | OUTPATIENT
Start: 2018-07-13 | End: 2018-08-12

## 2018-07-13 NOTE — TELEPHONE ENCOUNTER
----- Message from Lillian Palmer sent at 7/13/2018  8:01 AM CDT -----  Contact: self - [phone]  Yuriy - needs refills on bentyl 10mg - states is almost out of it - send to [facility] [place] you have fax # - please call patient at

## 2018-07-16 ENCOUNTER — TELEPHONE (OUTPATIENT)
Dept: GASTROENTEROLOGY | Facility: CLINIC | Age: 73
End: 2018-07-16

## 2018-07-31 ENCOUNTER — EXTERNAL CHRONIC CARE MANAGEMENT (OUTPATIENT)
Dept: PRIMARY CARE CLINIC | Facility: CLINIC | Age: 73
End: 2018-07-31
Payer: MEDICARE

## 2018-07-31 PROCEDURE — 99490 CHRNC CARE MGMT STAFF 1ST 20: CPT | Mod: S$PBB,,, | Performed by: INTERNAL MEDICINE

## 2018-07-31 PROCEDURE — 99490 CHRNC CARE MGMT STAFF 1ST 20: CPT | Mod: PBBFAC,PO | Performed by: INTERNAL MEDICINE

## 2018-08-07 ENCOUNTER — LAB VISIT (OUTPATIENT)
Dept: LAB | Facility: HOSPITAL | Age: 73
End: 2018-08-07
Attending: INTERNAL MEDICINE
Payer: MEDICARE

## 2018-08-07 DIAGNOSIS — E11.9 DIABETES MELLITUS WITHOUT COMPLICATION: ICD-10-CM

## 2018-08-07 LAB
ALBUMIN SERPL BCP-MCNC: 3.9 G/DL
ALP SERPL-CCNC: 101 U/L
ALT SERPL W/O P-5'-P-CCNC: 14 U/L
ANION GAP SERPL CALC-SCNC: 11 MMOL/L
AST SERPL-CCNC: 16 U/L
BILIRUB SERPL-MCNC: 0.4 MG/DL
BUN SERPL-MCNC: 15 MG/DL
CALCIUM SERPL-MCNC: 9.5 MG/DL
CHLORIDE SERPL-SCNC: 104 MMOL/L
CO2 SERPL-SCNC: 27 MMOL/L
CREAT SERPL-MCNC: 0.8 MG/DL
EST. GFR  (AFRICAN AMERICAN): >60 ML/MIN/1.73 M^2
EST. GFR  (NON AFRICAN AMERICAN): >60 ML/MIN/1.73 M^2
ESTIMATED AVG GLUCOSE: 114 MG/DL
GLUCOSE SERPL-MCNC: 109 MG/DL
HBA1C MFR BLD HPLC: 5.6 %
POTASSIUM SERPL-SCNC: 4.1 MMOL/L
PROT SERPL-MCNC: 7.5 G/DL
SODIUM SERPL-SCNC: 142 MMOL/L

## 2018-08-07 PROCEDURE — 80053 COMPREHEN METABOLIC PANEL: CPT

## 2018-08-07 PROCEDURE — 36415 COLL VENOUS BLD VENIPUNCTURE: CPT | Mod: PO

## 2018-08-07 PROCEDURE — 83036 HEMOGLOBIN GLYCOSYLATED A1C: CPT

## 2018-08-14 ENCOUNTER — OFFICE VISIT (OUTPATIENT)
Dept: INTERNAL MEDICINE | Facility: CLINIC | Age: 73
End: 2018-08-14
Payer: MEDICARE

## 2018-08-14 DIAGNOSIS — E78.5 DYSLIPIDEMIA: ICD-10-CM

## 2018-08-14 DIAGNOSIS — R05.9 COUGH: ICD-10-CM

## 2018-08-14 DIAGNOSIS — E03.9 ACQUIRED HYPOTHYROIDISM: ICD-10-CM

## 2018-08-14 DIAGNOSIS — I10 ESSENTIAL HYPERTENSION: Primary | ICD-10-CM

## 2018-08-14 PROCEDURE — 99214 OFFICE O/P EST MOD 30 MIN: CPT | Mod: S$PBB,,, | Performed by: INTERNAL MEDICINE

## 2018-08-14 PROCEDURE — 99214 OFFICE O/P EST MOD 30 MIN: CPT | Mod: PBBFAC,PO | Performed by: INTERNAL MEDICINE

## 2018-08-14 PROCEDURE — 99999 PR PBB SHADOW E&M-EST. PATIENT-LVL IV: CPT | Mod: PBBFAC,,, | Performed by: INTERNAL MEDICINE

## 2018-08-14 RX ORDER — NEOMYCIN SULFATE, POLYMYXIN B SULFATE AND HYDROCORTISONE 10; 3.5; 1 MG/ML; MG/ML; [USP'U]/ML
3 SUSPENSION/ DROPS AURICULAR (OTIC) 4 TIMES DAILY
Qty: 10 ML | Refills: 2 | Status: SHIPPED | OUTPATIENT
Start: 2018-08-14 | End: 2020-01-10 | Stop reason: SDUPTHER

## 2018-08-14 RX ORDER — LISINOPRIL AND HYDROCHLOROTHIAZIDE 12.5; 2 MG/1; MG/1
1 TABLET ORAL DAILY
Qty: 90 TABLET | Refills: 3 | Status: SHIPPED | OUTPATIENT
Start: 2018-08-14 | End: 2019-03-08 | Stop reason: ALTCHOICE

## 2018-08-14 RX ORDER — PRAVASTATIN SODIUM 10 MG/1
10 TABLET ORAL
Qty: 36 TABLET | Refills: 3 | Status: SHIPPED | OUTPATIENT
Start: 2018-08-15 | End: 2018-11-28 | Stop reason: SINTOL

## 2018-08-14 RX ORDER — FLUTICASONE PROPIONATE 50 MCG
1 SPRAY, SUSPENSION (ML) NASAL DAILY
Qty: 16 G | Refills: 3 | Status: SHIPPED | OUTPATIENT
Start: 2018-08-14 | End: 2020-09-22

## 2018-08-14 RX ORDER — PHENAZOPYRIDINE HYDROCHLORIDE 100 MG/1
100 TABLET, FILM COATED ORAL 3 TIMES DAILY PRN
Qty: 30 TABLET | Refills: 1 | Status: SHIPPED | OUTPATIENT
Start: 2018-08-14 | End: 2018-08-24

## 2018-08-14 RX ORDER — ESCITALOPRAM OXALATE 10 MG/1
10 TABLET ORAL DAILY
Qty: 90 TABLET | Refills: 3 | Status: SHIPPED | OUTPATIENT
Start: 2018-08-14 | End: 2019-08-20 | Stop reason: SDUPTHER

## 2018-08-14 RX ORDER — LEVOTHYROXINE SODIUM 88 UG/1
88 TABLET ORAL DAILY
Qty: 90 TABLET | Refills: 3 | Status: SHIPPED | OUTPATIENT
Start: 2018-08-14 | End: 2019-05-10 | Stop reason: SDUPTHER

## 2018-08-15 VITALS
HEART RATE: 74 BPM | SYSTOLIC BLOOD PRESSURE: 148 MMHG | TEMPERATURE: 98 F | OXYGEN SATURATION: 97 % | BODY MASS INDEX: 25.97 KG/M2 | WEIGHT: 155.88 LBS | HEIGHT: 65 IN | RESPIRATION RATE: 16 BRPM | DIASTOLIC BLOOD PRESSURE: 70 MMHG

## 2018-08-15 NOTE — PROGRESS NOTES
This office note has been dictated.  HISTORY OF PRESENT ILLNESS:  This is a 72-year-old lady following up on   hypertension, dyslipidemia, hypothyroidism and others.  She is seeing   Gastroenterology for some ongoing issues of chronic diarrhea.  She currently   denies chest pain, palpitations, syncope, edema or claudication.  She does state   for a couple of weeks, she has had a slight dry tickle in the throat type of   cough.  Some sinus congestion, minimal.  She does report having some seasonal   allergies and thinks this may be such taking an oral antihistamine.    CURRENT MEDICATIONS:  All medications noted and reviewed in the electronic   medical record medication list.    REVIEW OF SYSTEMS:  CONSTITUTIONAL:  No fever, no chills, no generalized body aches.  CARDIOVASCULAR:  No chest pain, palpitations, syncope.  No edema.  No   claudication.  RESPIRATORY:  See HPI.  GASTROINTESTINAL:  No nausea, vomiting, abdominal pain.    PAST MEDICAL HISTORY, PAST SURGICAL HISTORY, FAMILY MEDICAL HISTORY AND SOCIAL   HISTORY:  All noted and reviewed in the electronic medical record history   sections.    PHYSICAL EXAMINATION:  GENERAL:  Alert, pleasant, appropriately groomed lady in no acute distress.  VITAL SIGNS:  Blood pressure taken manually by this examiner is 148/70.  Other   vital signs noted and reviewed as normal.  HEENT:  Normocephalic.  NECK:  Supple, no masses, no thyromegaly.  LUNGS:  Clear to auscultation.  CARDIOVASCULAR:  Regular rate and rhythm.  There is no significant murmur.    Carotids are full bilaterally without bruits.  There is no JVD.  There is no   peripheral extremity edema.  No ischemic changes in lower extremities.  MENTAL STATUS:  Alert, oriented.  Affect and mood all appropriate.    DATA:  Lab data noted and reviewed from 08/07/2018, metabolic profile and   glycohemoglobin.    IMPRESSION:  1.  Hypertension, elevated systolic reading today, though home readings have   been reasonable.  2.   Dyslipidemia.  She has been off her pravastatin, just started taking such   again about two weeks ago.  3.  Hypothyroidism, on replacement therapy.  4.  Cough seems to be allergic rhinitis in nature with postnasal drip, but if   persists, we will have to consider ACE inhibitor induced.    PLAN:  1.  Recheck chemistry profile and lipid profile in three months.  2.  Use Flonase nasal consistently for the next few weeks.  3.  If cough persist over the next month, contact the office and we will   consider further evaluation and probably discontinue her ACE inhibitor at that   time as well.  Return to clinic in six months unless otherwise specified.      LISETTE/ANNABELLA  dd: 08/15/2018 10:03:09 (CDT)  td: 08/15/2018 21:54:14 (CDT)  Doc ID   #[id number]  Job ID #[id number]    CC:

## 2018-08-16 ENCOUNTER — TELEPHONE (OUTPATIENT)
Dept: ENDOSCOPY | Facility: HOSPITAL | Age: 73
End: 2018-08-16

## 2018-08-20 RX ORDER — DICYCLOMINE HYDROCHLORIDE 10 MG/1
10 CAPSULE ORAL
COMMUNITY
End: 2018-08-20 | Stop reason: SDUPTHER

## 2018-08-20 RX ORDER — DICYCLOMINE HYDROCHLORIDE 10 MG/1
10 CAPSULE ORAL
Qty: 60 CAPSULE | Refills: 3 | Status: SHIPPED | OUTPATIENT
Start: 2018-08-20 | End: 2018-09-04 | Stop reason: SDUPTHER

## 2018-08-20 NOTE — TELEPHONE ENCOUNTER
----- Message from Sarah Del Rio sent at 8/20/2018 10:47 AM CDT -----  Contact: pt#[phone] or [phone]  :Rx Refill/Request     Is this a Refill or New Rx:  refill  Rx Name and Strength:  dicyclomine (BENTYL) 10 MG capsule  Preferred Pharmacy with phone number: [facility] - [address]  Communication Preference:callback   Additional Information:

## 2018-08-31 ENCOUNTER — EXTERNAL CHRONIC CARE MANAGEMENT (OUTPATIENT)
Dept: PRIMARY CARE CLINIC | Facility: CLINIC | Age: 73
End: 2018-08-31
Payer: MEDICARE

## 2018-08-31 PROCEDURE — 99490 CHRNC CARE MGMT STAFF 1ST 20: CPT | Mod: PBBFAC,PO | Performed by: INTERNAL MEDICINE

## 2018-08-31 PROCEDURE — 99490 CHRNC CARE MGMT STAFF 1ST 20: CPT | Mod: S$PBB,,, | Performed by: INTERNAL MEDICINE

## 2018-09-04 RX ORDER — DICYCLOMINE HYDROCHLORIDE 10 MG/1
10 CAPSULE ORAL 3 TIMES DAILY PRN
Qty: 60 CAPSULE | Refills: 3 | Status: SHIPPED | OUTPATIENT
Start: 2018-09-04 | End: 2018-10-01

## 2018-09-04 NOTE — TELEPHONE ENCOUNTER
----- Message from Linda Garcia sent at 9/4/2018  8:07 AM CDT -----  Contact: Self- [phone] or [phone]                                Prescription Refill Request  Name of medication and dose dicyclomine (BENTYL) 10 MG capsule    Pharmacy [facility]/pharmacy #[id number] - [place] - [address] [phone]      Are you completely out of your medication YES    Additional Information:  Pt requesting a 30 day supply- [facility] pharmacy is out of the rx- 16 pills left- takes 3 a day

## 2018-09-30 ENCOUNTER — EXTERNAL CHRONIC CARE MANAGEMENT (OUTPATIENT)
Dept: PRIMARY CARE CLINIC | Facility: CLINIC | Age: 73
End: 2018-09-30
Payer: MEDICARE

## 2018-09-30 PROCEDURE — 99490 CHRNC CARE MGMT STAFF 1ST 20: CPT | Mod: S$PBB,,, | Performed by: INTERNAL MEDICINE

## 2018-09-30 PROCEDURE — 99490 CHRNC CARE MGMT STAFF 1ST 20: CPT | Mod: PBBFAC,PO | Performed by: INTERNAL MEDICINE

## 2018-10-01 ENCOUNTER — OFFICE VISIT (OUTPATIENT)
Dept: GASTROENTEROLOGY | Facility: CLINIC | Age: 73
End: 2018-10-01
Payer: MEDICARE

## 2018-10-01 VITALS
DIASTOLIC BLOOD PRESSURE: 69 MMHG | BODY MASS INDEX: 25.68 KG/M2 | HEART RATE: 63 BPM | WEIGHT: 154.13 LBS | SYSTOLIC BLOOD PRESSURE: 162 MMHG | HEIGHT: 65 IN

## 2018-10-01 DIAGNOSIS — R19.7 DIARRHEA, UNSPECIFIED TYPE: Primary | ICD-10-CM

## 2018-10-01 PROCEDURE — 99213 OFFICE O/P EST LOW 20 MIN: CPT | Mod: PBBFAC | Performed by: INTERNAL MEDICINE

## 2018-10-01 PROCEDURE — 99999 PR PBB SHADOW E&M-EST. PATIENT-LVL III: CPT | Mod: PBBFAC,,, | Performed by: INTERNAL MEDICINE

## 2018-10-01 PROCEDURE — 99214 OFFICE O/P EST MOD 30 MIN: CPT | Mod: S$PBB,,, | Performed by: INTERNAL MEDICINE

## 2018-10-01 RX ORDER — DIPHENOXYLATE HYDROCHLORIDE AND ATROPINE SULFATE 2.5; .025 MG/1; MG/1
1 TABLET ORAL 4 TIMES DAILY PRN
Qty: 30 TABLET | Refills: 1 | Status: SHIPPED | OUTPATIENT
Start: 2018-10-01 | End: 2018-10-11 | Stop reason: SDUPTHER

## 2018-10-01 RX ORDER — DIPHENOXYLATE HYDROCHLORIDE AND ATROPINE SULFATE 2.5; .025 MG/1; MG/1
1 TABLET ORAL 4 TIMES DAILY PRN
Qty: 30 TABLET | Refills: 1 | Status: SHIPPED | OUTPATIENT
Start: 2018-10-01 | End: 2018-10-01 | Stop reason: SDUPTHER

## 2018-10-01 NOTE — PROGRESS NOTES
REASON FOR VISIT:  Diarrhea with urge fecal incontinence.    HISTORY OF PRESENT ILLNESS:  Ms. Gleason is a 73-year-old who has been   complaining of loose bowel movements with urge fecal incontinence, which has not   responded to probiotics or Bentyl.  In the past, she has undergone stool   testing for C. diff, routine cultures, Giardia.  Negative ova, parasites and no   evidence of white cells.  She has also been tested for pancreatic insufficiency   with stool for fecal elastase and was negative.  Prior colonoscopy for screening   was unremarkable.  She had previously seen the surgeon, Dr. Lui, for fecal   urgency and digital rectal exam revealed good tone and defecography was normal   as well.  So today, we discussed about repeating colonoscopy with biopsies to   evaluate for microscopic colitis.  In the meanwhile, she will start psyllium   fiber supplement and we will give her a prescription for Lomotil to help with   the diarrhea and further evaluation will be made thereafter.    PAST MEDICAL, SURGICAL, SOCIAL AND FAMILY HISTORY:  Reviewed.    MEDICATIONS AND ALLERGIES:  Reviewed.    REVIEW OF SYSTEMS:  CONSTITUTIONAL:  No fever, no chills, no weight loss.  Appetite is normal.  EYES:  No visual changes.  ENT:  No odynophagia or hoarseness of voice.  CARDIOVASCULAR:  No angina or palpitation.  RESPIRATORY:  No shortness of breath or wheezing.  GASTROINTESTINAL:  See HPI.  No blood in the stool.    PHYSICAL EXAMINATION:  VITAL SIGNS:  See EPIC.  GENERAL:  Awake, alert and oriented x3, no acute distress.  No physical exam done today.  About 25 minutes spent with the patient and her   daughter-in-law, about 50% in counseling regarding evaluation for her ongoing   diarrhea.    Her prior TTG IgA was negative.  I do not think upper endoscopy is warranted at   this time.    IMPRESSION:  Continued diarrhea with urge fecal incontinence.    RECOMMENDATION:  1.  Proceed with a colonoscopy with biopsies.  2.  Trial of  psyllium daily and Lomotil p.r.n. for diarrhea.        ACT/HN  dd: 10/01/2018 10:07:37 (CDT)  td: 10/02/2018 05:17:02 (CDT)  Doc ID   #[id number]  Job ID #[id number]    CC:

## 2018-10-08 ENCOUNTER — TELEPHONE (OUTPATIENT)
Dept: INTERNAL MEDICINE | Facility: CLINIC | Age: 73
End: 2018-10-08

## 2018-10-08 NOTE — TELEPHONE ENCOUNTER
"----- Message from Katlin Lundberg sent at 10/8/2018  3:18 PM CDT -----  Contact: Elizabeth Gleason [phone]  Patient would like to get medical advice.    Symptoms (please be specific):  Bladder Pressure     How long has patient had these symptoms:  Since Friday    Pharmacy name and phone # (DON'T enter "on file" or "in chart"):  [id number] - [place], [place] - [address] [phone] (Phone)  [phone] (Fax)    Any drug allergies:  No    Would you prefer a response via [employer]?:  NO   Comments:  Patient states she gets frequent bladder infection she would like to know if a script can be called in or if she needs to be seen tomorrow.     "

## 2018-10-09 RX ORDER — SULFAMETHOXAZOLE AND TRIMETHOPRIM 800; 160 MG/1; MG/1
1 TABLET ORAL 2 TIMES DAILY
Qty: 10 TABLET | Refills: 0 | Status: SHIPPED | OUTPATIENT
Start: 2018-10-09 | End: 2018-10-14

## 2018-10-11 RX ORDER — DIPHENOXYLATE HYDROCHLORIDE AND ATROPINE SULFATE 2.5; .025 MG/1; MG/1
1 TABLET ORAL 4 TIMES DAILY PRN
Qty: 30 TABLET | Refills: 1 | Status: SHIPPED | OUTPATIENT
Start: 2018-10-11 | End: 2018-10-21

## 2018-10-11 RX ORDER — DIPHENOXYLATE HYDROCHLORIDE AND ATROPINE SULFATE 2.5; .025 MG/1; MG/1
1 TABLET ORAL 4 TIMES DAILY PRN
Qty: 30 TABLET | Refills: 1 | Status: SHIPPED | OUTPATIENT
Start: 2018-10-11 | End: 2018-10-11 | Stop reason: SDUPTHER

## 2018-10-11 NOTE — TELEPHONE ENCOUNTER
----- Message from Christopher Yan MD sent at 10/11/2018  1:47 PM CDT -----  Please notify patient, [facility] [address]

## 2018-10-11 NOTE — TELEPHONE ENCOUNTER
----- Message from Linda Jose sent at 10/11/2018  9:52 AM CDT -----  Contact: Self- [phone] or [phone]                                Prescription Refill Request  Name of medication and dose diphenoxylate-atropine 2.5-0.025 mg (LOMOTIL) 2.5-0.025 mg per tablet    Ph[facility] - [address] [phone]      Are you completely out of your medication YES    Additional Information:

## 2018-10-26 NOTE — TELEPHONE ENCOUNTER
----- Message from Calista Morris sent at 4/18/2018 11:01 AM CDT -----  Contact: pt [phone] or cell [phone]  Yuriy    Pt states her rx is stating it is too soon to refill her dicyclomine (BENTYL) 10 MG capsule. Pt states she is out due too Dr Yan advising her to take 2 pills a day instead of one a day. Pt is asking to speak with the nurse regarding the matter. Pt states she will be out of medication on Sunday.  Please call pt.       [facility]/pharmacy #[id number] - [place] -   [address] [facility] [address]   [phone] (Phone)  [phone] (Fax)      
Ma returned patient call to get an update on her condition , no answer message left on pt vm     Dr. Yan ,  Wants new script  Please see patient note  Please advise   
constant

## 2018-10-29 RX ORDER — DIPHENOXYLATE HYDROCHLORIDE AND ATROPINE SULFATE 2.5; .025 MG/1; MG/1
1 TABLET ORAL 4 TIMES DAILY PRN
Qty: 30 TABLET | Refills: 1 | Status: SHIPPED | OUTPATIENT
Start: 2018-10-29 | End: 2018-10-29

## 2018-10-29 RX ORDER — DIPHENOXYLATE HYDROCHLORIDE AND ATROPINE SULFATE 2.5; .025 MG/1; MG/1
1 TABLET ORAL 4 TIMES DAILY PRN
Qty: 30 TABLET | Refills: 1 | Status: SHIPPED | OUTPATIENT
Start: 2018-10-29 | End: 2018-11-08

## 2018-10-31 ENCOUNTER — EXTERNAL CHRONIC CARE MANAGEMENT (OUTPATIENT)
Dept: PRIMARY CARE CLINIC | Facility: CLINIC | Age: 73
End: 2018-10-31
Payer: MEDICARE

## 2018-10-31 PROCEDURE — 99490 CHRNC CARE MGMT STAFF 1ST 20: CPT | Mod: S$PBB,,, | Performed by: INTERNAL MEDICINE

## 2018-10-31 PROCEDURE — 99490 CHRNC CARE MGMT STAFF 1ST 20: CPT | Mod: PBBFAC,PO | Performed by: INTERNAL MEDICINE

## 2018-11-08 ENCOUNTER — HOSPITAL ENCOUNTER (OUTPATIENT)
Dept: RADIOLOGY | Facility: HOSPITAL | Age: 73
Discharge: HOME | End: 2018-11-08
Attending: INTERNAL MEDICINE
Payer: MEDICARE

## 2018-11-08 DIAGNOSIS — R92.8 ABNORMAL MAMMOGRAM: ICD-10-CM

## 2018-11-08 PROCEDURE — 77062 BREAST TOMOSYNTHESIS BI: CPT | Mod: 26,,, | Performed by: RADIOLOGY

## 2018-11-08 PROCEDURE — 77066 DX MAMMO INCL CAD BI: CPT | Mod: TC,PO

## 2018-11-08 PROCEDURE — 77066 DX MAMMO INCL CAD BI: CPT | Mod: 26,,, | Performed by: RADIOLOGY

## 2018-11-14 ENCOUNTER — LAB VISIT (OUTPATIENT)
Dept: LAB | Facility: HOSPITAL | Age: 73
End: 2018-11-14
Attending: INTERNAL MEDICINE
Payer: MEDICARE

## 2018-11-14 DIAGNOSIS — E78.5 DYSLIPIDEMIA: ICD-10-CM

## 2018-11-14 DIAGNOSIS — I10 ESSENTIAL HYPERTENSION: ICD-10-CM

## 2018-11-14 LAB
ALBUMIN SERPL BCP-MCNC: 4 G/DL
ALP SERPL-CCNC: 93 U/L
ALT SERPL W/O P-5'-P-CCNC: 23 U/L
ANION GAP SERPL CALC-SCNC: 10 MMOL/L
AST SERPL-CCNC: 21 U/L
BILIRUB SERPL-MCNC: 0.5 MG/DL
BUN SERPL-MCNC: 13 MG/DL
CALCIUM SERPL-MCNC: 9.7 MG/DL
CHLORIDE SERPL-SCNC: 103 MMOL/L
CHOLEST SERPL-MCNC: 288 MG/DL
CHOLEST/HDLC SERPL: 8 {RATIO}
CO2 SERPL-SCNC: 27 MMOL/L
CREAT SERPL-MCNC: 0.8 MG/DL
EST. GFR  (AFRICAN AMERICAN): >60 ML/MIN/1.73 M^2
EST. GFR  (NON AFRICAN AMERICAN): >60 ML/MIN/1.73 M^2
GLUCOSE SERPL-MCNC: 124 MG/DL
HDLC SERPL-MCNC: 36 MG/DL
HDLC SERPL: 12.5 %
LDLC SERPL CALC-MCNC: ABNORMAL MG/DL
NONHDLC SERPL-MCNC: 252 MG/DL
POTASSIUM SERPL-SCNC: 4 MMOL/L
PROT SERPL-MCNC: 7.5 G/DL
SODIUM SERPL-SCNC: 140 MMOL/L
TRIGL SERPL-MCNC: 443 MG/DL

## 2018-11-14 PROCEDURE — 36415 COLL VENOUS BLD VENIPUNCTURE: CPT | Mod: PO

## 2018-11-14 PROCEDURE — 80053 COMPREHEN METABOLIC PANEL: CPT

## 2018-11-14 PROCEDURE — 80061 LIPID PANEL: CPT

## 2018-11-14 RX ORDER — DIPHENOXYLATE HYDROCHLORIDE AND ATROPINE SULFATE 2.5; .025 MG/1; MG/1
1 TABLET ORAL 4 TIMES DAILY PRN
Qty: 60 TABLET | Refills: 1 | Status: SHIPPED | OUTPATIENT
Start: 2018-11-14 | End: 2018-11-24

## 2018-11-14 NOTE — TELEPHONE ENCOUNTER
----- Message from Lillian Estela sent at 11/14/2018 10:51 AM CST -----  Contact: self [phone] or   Yuriy - needs refills on lomotil - takes 4 daily - needs more than 30 - [facility] [phone] - please call patient at [phone] or

## 2018-11-14 NOTE — TELEPHONE ENCOUNTER
Dr. Yan ,  Patient wants more than 30 pills cause shes taking 4 pills a day and she also wants to know when does she needs to follow up with you    Message sent to provider per pt request

## 2018-11-21 ENCOUNTER — TELEPHONE (OUTPATIENT)
Dept: INTERNAL MEDICINE | Facility: CLINIC | Age: 73
End: 2018-11-21

## 2018-11-21 NOTE — TELEPHONE ENCOUNTER
Informed the patient that recent cholesterol check his reviewed.  Clarify that she is taking and tolerating her current low dose of pravastatin.  If so I would recommend that we increase it slightly.  If she is agreeable let me know and we will give instructions.

## 2018-11-28 ENCOUNTER — TELEPHONE (OUTPATIENT)
Dept: INTERNAL MEDICINE | Facility: CLINIC | Age: 73
End: 2018-11-28

## 2018-11-28 DIAGNOSIS — E78.5 HYPERLIPIDEMIA, UNSPECIFIED HYPERLIPIDEMIA TYPE: Primary | ICD-10-CM

## 2018-11-28 RX ORDER — PITAVASTATIN CALCIUM 1.04 MG/1
TABLET, FILM COATED ORAL
Qty: 36 TABLET | Refills: 3 | Status: SHIPPED | OUTPATIENT
Start: 2018-11-28 | End: 2019-04-08

## 2018-11-28 NOTE — TELEPHONE ENCOUNTER
Spoke with patient and informed of lab results.  Patient states she's not taking the pravastatin, she states it gives her severe muscle aches.    She states she is willing to try something else.    Copies mailed to patient per request.

## 2018-11-30 ENCOUNTER — EXTERNAL CHRONIC CARE MANAGEMENT (OUTPATIENT)
Dept: PRIMARY CARE CLINIC | Facility: CLINIC | Age: 73
End: 2018-11-30
Payer: MEDICARE

## 2018-11-30 PROCEDURE — 99490 CHRNC CARE MGMT STAFF 1ST 20: CPT | Mod: PBBFAC,PO | Performed by: INTERNAL MEDICINE

## 2018-11-30 PROCEDURE — 99490 CHRNC CARE MGMT STAFF 1ST 20: CPT | Mod: S$PBB,,, | Performed by: INTERNAL MEDICINE

## 2018-12-17 RX ORDER — DICYCLOMINE HYDROCHLORIDE 10 MG/1
10 CAPSULE ORAL
Qty: 120 CAPSULE | Refills: 0 | Status: SHIPPED | OUTPATIENT
Start: 2018-12-17 | End: 2019-01-16

## 2018-12-17 NOTE — TELEPHONE ENCOUNTER
----- Message from Linda Garcia sent at 12/17/2018  3:15 PM CST -----  Contact: Self- [phone] or [phone]                                Prescription Refill Request  Name of medication and dose dicyclomine HCl 10 mg Oral 3 times daily PRN     [id number] - [place], [place] - [address] [facility] [phone]      Are you completely out of your medication YES    Additional Information:

## 2018-12-18 RX ORDER — DIPHENOXYLATE HYDROCHLORIDE AND ATROPINE SULFATE 2.5; .025 MG/1; MG/1
1 TABLET ORAL 4 TIMES DAILY PRN
Qty: 60 TABLET | Refills: 1 | Status: SHIPPED | OUTPATIENT
Start: 2018-12-18 | End: 2018-12-28

## 2018-12-18 NOTE — TELEPHONE ENCOUNTER
----- Message from Dorinda Lockwood sent at 12/18/2018 12:48 PM CST -----  Contact: pt: [phone]  Needs Advice    Reason for call: pt stated she made the mistake of requesting a refill for the wrong medication pt stated dicyclomine (BENTYL) 10 MG  Was called into pharmacy instead of Lomotil and was like for the correct medication to be called in        Communication Preference: pt: [phone]

## 2018-12-31 ENCOUNTER — EXTERNAL CHRONIC CARE MANAGEMENT (OUTPATIENT)
Dept: PRIMARY CARE CLINIC | Facility: CLINIC | Age: 73
End: 2018-12-31
Payer: MEDICARE

## 2018-12-31 PROCEDURE — 99490 CHRNC CARE MGMT STAFF 1ST 20: CPT | Mod: PBBFAC,PO | Performed by: INTERNAL MEDICINE

## 2018-12-31 PROCEDURE — 99490 PR CHRONIC CARE MGMT, 1ST 20 MIN: ICD-10-PCS | Mod: S$PBB,,, | Performed by: INTERNAL MEDICINE

## 2018-12-31 PROCEDURE — 99490 CHRNC CARE MGMT STAFF 1ST 20: CPT | Mod: S$PBB,,, | Performed by: INTERNAL MEDICINE

## 2019-01-22 RX ORDER — DIPHENOXYLATE HYDROCHLORIDE AND ATROPINE SULFATE 2.5; .025 MG/1; MG/1
TABLET ORAL
Refills: 1 | COMMUNITY
Start: 2019-01-06 | End: 2019-01-22 | Stop reason: SDUPTHER

## 2019-01-22 RX ORDER — DIPHENOXYLATE HYDROCHLORIDE AND ATROPINE SULFATE 2.5; .025 MG/1; MG/1
TABLET ORAL
Qty: 30 TABLET | Refills: 1 | Status: SHIPPED | OUTPATIENT
Start: 2019-01-22 | End: 2019-01-24 | Stop reason: SDUPTHER

## 2019-01-22 NOTE — TELEPHONE ENCOUNTER
----- Message from Sarah Del Rio sent at 1/22/2019 11:30 AM CST -----  Contact: pt#[phone] or [phone]  Rx Refill/Request     Is this a Refill or New Rx:  refill  Rx Name and Strength:  Lomotil 2.5  Preferred Pharmacy with phone number:   [facility]/pharmacy #[id number] - [place] - [address]  [address]  [place] [address]  Phone: [phone] Fax: [phone]    Communication Preference:call  Additional Information:

## 2019-01-24 RX ORDER — DIPHENOXYLATE HYDROCHLORIDE AND ATROPINE SULFATE 2.5; .025 MG/1; MG/1
TABLET ORAL
Qty: 60 TABLET | Refills: 0 | Status: SHIPPED | OUTPATIENT
Start: 2019-01-24 | End: 2019-02-15 | Stop reason: SDUPTHER

## 2019-01-24 NOTE — TELEPHONE ENCOUNTER
Dr. Yan,  Patient states a prescription was called in for lomotil and she was only given a 30 day supply patient states she takes the pills 4 times a day and its usually 60 pills  Please advise

## 2019-01-24 NOTE — TELEPHONE ENCOUNTER
----- Message from Lillian Chankert sent at 1/24/2019  1:15 PM CST -----  Contact: self - [phone] or [phone]  Yuriy    Needs Advice    Reason for call: lomotil - only got 30 pills - usually gets 60 - takes 4 daily        Communication Preference:  [phone] or [phone]    Additional Information[facility]  [facility] [phone]

## 2019-01-25 ENCOUNTER — TELEPHONE (OUTPATIENT)
Dept: GASTROENTEROLOGY | Facility: CLINIC | Age: 74
End: 2019-01-25

## 2019-01-25 NOTE — TELEPHONE ENCOUNTER
Ma spoke with pt ,pt informed that her medication was increased by dr dan on 1/24  Pt states [facility] if [facility] is [facility] for pickup

## 2019-01-25 NOTE — TELEPHONE ENCOUNTER
----- Message from Елена Padilla MA sent at 1/25/2019 11:37 AM CST -----  Contact: [phone] or [phone]   Needs Advice    Reason for call: Pt said that she called yesterday to have her Lomotil refilled and said that she was only given 30 and that she takes them 4 times a day. She would like to have the Rx increased         Communication Preference: [phone] or [phone]     Additional Information:    [id number] [place] - [address] [facility]  [address]  [place] [address]  Phone: [phone] Fax: [phone]

## 2019-02-15 RX ORDER — DIPHENOXYLATE HYDROCHLORIDE AND ATROPINE SULFATE 2.5; .025 MG/1; MG/1
TABLET ORAL
Qty: 60 TABLET | Refills: 0 | Status: SHIPPED | OUTPATIENT
Start: 2019-02-15 | End: 2019-03-08 | Stop reason: SDUPTHER

## 2019-02-15 NOTE — TELEPHONE ENCOUNTER
----- Message from Linda Jose sent at 2/15/2019 10:00 AM CST -----  Contact: Self- [phone] or [phone]                                Prescription Refill Request  Name of medication and dose diphenoxylate-atropine 2.5-0.025 mg (LOMOTIL) 2.5-0.025 mg per tablet    Pharmacy [facility]/pharmacy #[id number] - [place], [place] - [address] [facility] [phone]      Are you completely out of your medication YES    Additional Information:

## 2019-03-07 ENCOUNTER — TELEPHONE (OUTPATIENT)
Dept: INTERNAL MEDICINE | Facility: CLINIC | Age: 74
End: 2019-03-07

## 2019-03-07 DIAGNOSIS — R05.9 COUGH: Primary | ICD-10-CM

## 2019-03-07 NOTE — TELEPHONE ENCOUNTER
----- Message from Minerva Peralta sent at 3/7/2019  9:58 AM CST -----  Contact: Pt Home [phone] or Mobile [phone]  Patient would like to put in an order for a chest X-Ray. She said that she have lung problems and she's been coughing a lot.

## 2019-03-07 NOTE — TELEPHONE ENCOUNTER
Spoke to pt and she not having any discomfort ,  She has a persistent cough and concern about her lung issue and it been a very long time since she had a chest xray

## 2019-03-07 NOTE — TELEPHONE ENCOUNTER
As we discussed on our last visit her could be related to the lisinopril in her lisinopril-hctz.  I would like to d/c that med and prescribe other similar med if she is agreeable.let me know.We can obtain cxr also--ordered

## 2019-03-08 RX ORDER — DIPHENOXYLATE HYDROCHLORIDE AND ATROPINE SULFATE 2.5; .025 MG/1; MG/1
TABLET ORAL
Qty: 60 TABLET | Refills: 0 | Status: SHIPPED | OUTPATIENT
Start: 2019-03-08 | End: 2019-03-25 | Stop reason: SDUPTHER

## 2019-03-08 RX ORDER — LOSARTAN POTASSIUM AND HYDROCHLOROTHIAZIDE 12.5; 5 MG/1; MG/1
1 TABLET ORAL DAILY
Qty: 90 TABLET | Refills: 3 | Status: SHIPPED | OUTPATIENT
Start: 2019-03-08 | End: 2019-03-11 | Stop reason: SDUPTHER

## 2019-03-08 NOTE — TELEPHONE ENCOUNTER
----- Message from Kristyn Orozco sent at 3/8/2019 11:58 AM CST -----  Contact: self [phone]  Rx Refill/Request     Is this a Refill or New Rx:  Refill     Rx Name and Strength:  diphenoxylate-atropine 2.5-0.025 mg (LOMOTIL) 2.5-0.025 mg per tablet    Preferred Pharmacy with phone number:     [facility]/pharmacy #[id number]  [place] [address]   [phone] (Phone)  [phone] (Fax)      Communication Preference: self [phone]  Additional Information:

## 2019-03-11 RX ORDER — LOSARTAN POTASSIUM AND HYDROCHLOROTHIAZIDE 12.5; 5 MG/1; MG/1
1 TABLET ORAL DAILY
Qty: 90 TABLET | Refills: 3 | Status: SHIPPED | OUTPATIENT
Start: 2019-03-11 | End: 2020-01-10 | Stop reason: SDUPTHER

## 2019-03-11 NOTE — TELEPHONE ENCOUNTER
----- Message from Floresita Xie sent at 3/11/2019  7:58 AM CDT -----  Contact: patient [phone] cell phone or home [phone]  Pt said that  started her on a new bp medication but she doesn't know the name of it. She needs it to be sent to [facility] and it was sent to [facility] in error. Pt is on a fixed income and gets medicine at no cost. Please order it today and [facility]

## 2019-03-12 ENCOUNTER — TELEPHONE (OUTPATIENT)
Dept: INTERNAL MEDICINE | Facility: CLINIC | Age: 74
End: 2019-03-12

## 2019-03-12 ENCOUNTER — HOSPITAL ENCOUNTER (OUTPATIENT)
Dept: RADIOLOGY | Facility: HOSPITAL | Age: 74
Discharge: HOME | End: 2019-03-12
Attending: INTERNAL MEDICINE
Payer: MEDICARE

## 2019-03-12 DIAGNOSIS — R05.9 COUGH: ICD-10-CM

## 2019-03-12 PROCEDURE — 71046 X-RAY EXAM CHEST 2 VIEWS: CPT | Mod: 26,,, | Performed by: RADIOLOGY

## 2019-03-12 PROCEDURE — 71046 XR CHEST PA AND LATERAL: ICD-10-PCS | Mod: 26,,, | Performed by: RADIOLOGY

## 2019-03-12 PROCEDURE — 71046 X-RAY EXAM CHEST 2 VIEWS: CPT | Mod: TC,PO

## 2019-03-12 NOTE — TELEPHONE ENCOUNTER
----- Message from Floresita Xie sent at 3/12/2019  7:06 AM CDT -----  Contact: patient [phone]  or [phone] cell  Pt called yesterday and asked for her prescriptions to be sent directly to [facility] but that information was not on file. Pt gets her medicine at no cost but only gets medicine at [facility] if it a one time rx that she needs right away.    [facility] [address] [place] [address] [phone]      Please order the new blood pressure medicine from [facility] . Patient does not know the name of the medicine.

## 2019-03-12 NOTE — TELEPHONE ENCOUNTER
----- Message from Katlin Lundberg sent at 3/12/2019  8:21 AM CDT -----  Contact: Elizabeth Victorino [phone]  The patient would like a call back in regards to her BP medication. The patient states the medication was changed but she is not aware of the name of the medication. And she thinks it was called into the wrong pharmacy.

## 2019-03-25 RX ORDER — DIPHENOXYLATE HYDROCHLORIDE AND ATROPINE SULFATE 2.5; .025 MG/1; MG/1
TABLET ORAL
Qty: 60 TABLET | Refills: 0 | Status: SHIPPED | OUTPATIENT
Start: 2019-03-25 | End: 2019-04-08 | Stop reason: SDUPTHER

## 2019-03-25 NOTE — TELEPHONE ENCOUNTER
----- Message from Elizabeth Rosario sent at 3/25/2019  9:45 AM CDT -----  Contact: pt [phone] or cell [phone]  Rx Refill/Request     Is this a Refill or New Rx:  refill  Rx Name and Strength:  diphenoxylate-atropine 2.5-0.025 mg (LOMOTIL) 2.5-0.025 mg per tablet  Preferred Pharmacy with phone number:   [facility]/pharmacy #[id number] - [place] [address]  [place] [address]  Phone: [phone] Fax: [phone]    Communication Preference:pt [phone] or cell [phone]  Additional Information:  Can you order more refill. 60 + Refills

## 2019-03-25 NOTE — TELEPHONE ENCOUNTER
----- Message from Elizabeth Rosario sent at 3/25/2019  9:45 AM CDT -----  Contact: pt [phone] or cell [phone]  Rx Refill/Request     Is this a Refill or New Rx:  refill  Rx Name and Strength:  diphenoxylate-atropine 2.5-0.025 mg (LOMOTIL) 2.5-0.025 mg per tablet  Preferred Pharmacy with phone number:   [facility]/pharmacy #[id number] - [place] [facility] [address]  [place] [address]  Phone: [phone] Fax: [phone]    Communication Preference:pt [phone] or cell [phone]  Additional Information:  Can you order more refill. 60 + Refills

## 2019-03-26 ENCOUNTER — TELEPHONE (OUTPATIENT)
Dept: GASTROENTEROLOGY | Facility: CLINIC | Age: 74
End: 2019-03-26

## 2019-03-26 NOTE — TELEPHONE ENCOUNTER
Dr. Yan,   Pt states she would like a refill with her lomotil so she doesn't have to call every 15 days   Please advise

## 2019-03-26 NOTE — TELEPHONE ENCOUNTER
----- Message from Lillian Palmer sent at 3/26/2019 12:03 PM CDT -----  Contact: self   Yuriy    Needs Advice    Reason for call: has questions re her meds        Communication Preference:[phone]    Additional Information:

## 2019-04-02 ENCOUNTER — TELEPHONE (OUTPATIENT)
Dept: INTERNAL MEDICINE | Facility: CLINIC | Age: 74
End: 2019-04-02

## 2019-04-02 NOTE — TELEPHONE ENCOUNTER
----- Message from Minerva Peralta sent at 4/2/2019 10:54 AM CDT -----  Contact: Pt self Mobile [phone] or Home [phone]  Patient would like to get test results  Name of test (lab, mammo, etc.):   Fasting labs/XR Chest  Date of test:  03/12/2019  Ordering provider: Doctor Parth Reyes   Where was the test performed:  At the [facility] location   Would the patient rather a call back or a response via [facility]?:  A call back please.

## 2019-04-02 NOTE — TELEPHONE ENCOUNTER
Spoke with pt to advise of results and MD response.    Verbalized understanding.    1. States that she has been taking her cholesterol medication M,W,F as prescribed.    Please advise if she needs to take the cholesterol med more regularly.    Thanks.    Scheduled for f/u on 5/10/19

## 2019-04-02 NOTE — TELEPHONE ENCOUNTER
cxr ok  Labs ok other than chol remains high.  Is she taking her chol med?  She is due for OV --f/u

## 2019-04-08 ENCOUNTER — TELEPHONE (OUTPATIENT)
Dept: GASTROENTEROLOGY | Facility: CLINIC | Age: 74
End: 2019-04-08

## 2019-04-08 RX ORDER — PITAVASTATIN CALCIUM 1.04 MG/1
1 TABLET, FILM COATED ORAL EVERY OTHER DAY
Qty: 45 TABLET | Refills: 3 | Status: SHIPPED | OUTPATIENT
Start: 2019-04-08 | End: 2020-01-10 | Stop reason: SDUPTHER

## 2019-04-08 RX ORDER — DIPHENOXYLATE HYDROCHLORIDE AND ATROPINE SULFATE 2.5; .025 MG/1; MG/1
TABLET ORAL
Qty: 60 TABLET | Refills: 0 | Status: SHIPPED | OUTPATIENT
Start: 2019-04-08 | End: 2019-04-25 | Stop reason: SDUPTHER

## 2019-04-08 NOTE — TELEPHONE ENCOUNTER
----- Message from Lillian Palmer sent at 4/8/2019  2:12 PM CDT -----  Contact: self [phone]  Yuriy    Needs Advice    Reason for call: is asking for refills on lomotil - called this morning  -  Is asking why dr dan only calls in one refill at a time - is asking for multiple refills on lomotil         Communication Preference:[phone]    Additional Information: [place] [id number]      Attempted to contact patient but no answer or voicemail set up. Will try again at a later time. CT order and labs pended.

## 2019-04-08 NOTE — TELEPHONE ENCOUNTER
Ma spoke with pt ,pt confirm appt ,pt states she will call back to rescheduled Ma advised pt that will be fine and anyone whom answers can reschedule her appt for her   Pt verbalized understanding

## 2019-04-08 NOTE — TELEPHONE ENCOUNTER
Dr. Yan,  Patient wants to know why you are calling in only 60 pills without refills ? Pt states when does she needs to come in for a follow up also   Please advise

## 2019-04-08 NOTE — TELEPHONE ENCOUNTER
----- Message from Lillian Palmer sent at 4/8/2019 10:38 AM CDT -----  Contact: self  or   Yuriy    Needs Advice    Reason for call: asking for refills on lomotil         Communication Preference:[phone] or     Additional Information: [phone]

## 2019-04-08 NOTE — TELEPHONE ENCOUNTER
----- Message from Yuriy Workman sent at 4/8/2019 10:05 AM CDT -----  Contact: Patient at home [phone] or cell [phone]  Dr. Reyes changed her medication frequency so she needs a new RX called in.  pitavastatin calcium (LIVALO) 1 mg Tab tablet  Was taking RX Monday, Wednesday & Friday.  Now is to be taking it every other day so she will run out of her RX sooner.  [facility] - [address]  [phone] (Phone)  [phone] (Fax)

## 2019-04-25 RX ORDER — DIPHENOXYLATE HYDROCHLORIDE AND ATROPINE SULFATE 2.5; .025 MG/1; MG/1
TABLET ORAL
Qty: 120 TABLET | Refills: 3 | Status: SHIPPED | OUTPATIENT
Start: 2019-04-25 | End: 2019-08-14 | Stop reason: SDUPTHER

## 2019-04-25 NOTE — TELEPHONE ENCOUNTER
----- Message from Azul Mackenzie sent at 4/25/2019  1:04 PM CDT -----  Contact: Pt:[phone]  Rx Refill/Request     Is this a Refill or New Rx:Refill   Rx Name and Strength:diphenoxylate-atropine 2.5-0.025 mg (LOMOTIL) 2.5-0.025 mg per tablet    Preferred Pharmacy with phone number: [facility]/pharmacy #[id number]  [place] [address] [phone] (Phone)  [phone] (Fax)          Communication Preference:Pt:[phone]  Additional Information:

## 2019-04-26 ENCOUNTER — OFFICE VISIT (OUTPATIENT)
Dept: OBSTETRICS AND GYNECOLOGY | Facility: CLINIC | Age: 74
End: 2019-04-26
Payer: MEDICARE

## 2019-04-26 VITALS
HEIGHT: 65 IN | SYSTOLIC BLOOD PRESSURE: 140 MMHG | BODY MASS INDEX: 26.48 KG/M2 | DIASTOLIC BLOOD PRESSURE: 60 MMHG | WEIGHT: 158.94 LBS

## 2019-04-26 DIAGNOSIS — Z01.419 WELL FEMALE EXAM WITH ROUTINE GYNECOLOGICAL EXAM: Primary | ICD-10-CM

## 2019-04-26 DIAGNOSIS — N90.7 VULVAR CYST: ICD-10-CM

## 2019-04-26 PROBLEM — Z12.11 SCREENING FOR COLON CANCER: Status: RESOLVED | Noted: 2017-08-25 | Resolved: 2019-04-26

## 2019-04-26 PROCEDURE — G0101 CA SCREEN;PELVIC/BREAST EXAM: HCPCS | Mod: S$PBB,,, | Performed by: OBSTETRICS & GYNECOLOGY

## 2019-04-26 PROCEDURE — 99999 PR PBB SHADOW E&M-EST. PATIENT-LVL III: ICD-10-PCS | Mod: PBBFAC,,, | Performed by: OBSTETRICS & GYNECOLOGY

## 2019-04-26 PROCEDURE — 99213 OFFICE O/P EST LOW 20 MIN: CPT | Mod: PBBFAC,PO | Performed by: OBSTETRICS & GYNECOLOGY

## 2019-04-26 PROCEDURE — 99999 PR PBB SHADOW E&M-EST. PATIENT-LVL III: CPT | Mod: PBBFAC,,, | Performed by: OBSTETRICS & GYNECOLOGY

## 2019-04-26 PROCEDURE — G0101 CA SCREEN;PELVIC/BREAST EXAM: HCPCS | Mod: PBBFAC,PO | Performed by: OBSTETRICS & GYNECOLOGY

## 2019-04-26 PROCEDURE — G0101 PR CA SCREEN;PELVIC/BREAST EXAM: ICD-10-PCS | Mod: S$PBB,,, | Performed by: OBSTETRICS & GYNECOLOGY

## 2019-04-26 NOTE — PROGRESS NOTES
SUBJECTIVE:   73 y.o. female   for routine gyn exam. No LMP recorded. Patient is postmenopausal..  She reports two small bumps on vulva.  One close to rectus, and one closer to clitoris.  No PMB.  No HRT.         Past Medical History:   Diagnosis Date    ALLERGIC RHINITIS     Carotid artery narrowing     rt worse than the left    Chronic diarrhea     GERD (gastroesophageal reflux disease)     HEARING LOSS     Hyperlipidemia     Hypertension     Hypothyroid     Impaired fasting glucose     Migraine headache     Osteopenia     Tuberculosis     childhood TB     Past Surgical History:   Procedure Laterality Date    COLONOSCOPY N/A 2017    Performed by [facility]y [facility]ui MD at [facility] (4TH FLR)    left 5th toe surgery      rt ear surgery      rt lung lobectomy and a rib removed      secondary to TB    TUBAL LIGATION       Social History     Socioeconomic History    Marital status:      Spouse name: Not on file    Number of children: Not on file    Years of education: Not on file    Highest education level: Not on file   Occupational History    Occupation: retired teller   Social Needs    Financial resource strain: Not on file    Food insecurity:     Worry: Not on file     Inability: Not on file    Transportation needs:     Medical: Not on file     Non-medical: Not on file   Tobacco Use    Smoking status: Never Smoker    Smokeless tobacco: Never Used   Substance and Sexual Activity    Alcohol use: Yes     Alcohol/week: 0.0 oz     Comment: occasionally    Drug use: No    Sexual activity: Never   Lifestyle    Physical activity:     Days per week: Not on file     Minutes per session: Not on file    Stress: Not on file   Relationships    Social connections:     Talks on phone: Not on file     Gets together: Not on file     Attends Restorationist service: Not on file     Active member of club or organization: Not on file     Attends meetings of clubs or organizations: Not on file      Relationship status: Not on file   Other Topics Concern    Not on file   Social History Narrative    She exercises regularly.     Family History   Problem Relation Age of Onset    Heart disease Mother         cabg    Kidney disease Mother         was on dialysis    Stroke Father     Cancer Brother         lung cancer    Colon cancer Maternal Aunt     Breast cancer Cousin         paternal FIRST cousin    Ovarian cancer Neg Hx     Esophageal cancer Neg Hx      OB History    Para Term  AB Living   1 1 1     1   SAB TAB Ectopic Multiple Live Births           1      # Outcome Date GA Lbr Sukhi/2nd Weight Sex Delivery Anes PTL Lv   1 Term                  Current Outpatient Medications   Medication Sig Dispense Refill    aspirin (ECOTRIN) 81 MG EC tablet Take 81 mg by mouth once daily.      blood sugar diagnostic Strp 1 freestyle lite test strip weekly 50 strip 11    blood-glucose meter (FREESTYLE FREEDOM LITE) kit Use as instructed 1 each 0    diphenoxylate-atropine 2.5-0.025 mg (LOMOTIL) 2.5-0.025 mg per tablet TAKE 1 TABLET BY MOUTH 4 (FOUR) TIMES DAILY AS NEEDED FOR DIARRHEA. 120 tablet 3    escitalopram oxalate (LEXAPRO) 10 MG tablet Take 1 tablet (10 mg total) by mouth once daily. 90 tablet 3    fexofenadine (ALLEGRA) 180 MG tablet Take 1 tablet (180 mg total) by mouth once daily. 90 tablet 3    fluticasone (FLONASE) 50 mcg/actuation nasal spray 1 spray (50 mcg total) by Each Nare route once daily. 16 g 3    lancets Misc To check BG 1 times daily, to use with insurance preferred meter 50 each 11    levothyroxine (SYNTHROID) 88 MCG tablet Take 1 tablet (88 mcg total) by mouth once daily. 90 tablet 3    losartan-hydrochlorothiazide 50-12.5 mg (HYZAAR) 50-12.5 mg per tablet Take 1 tablet by mouth once daily. 90 tablet 3    neomycin-polymyxin-hydrocortisone (CORTISPORIN) 3.5-10,000-1 mg/mL-unit/mL-% otic suspension Place 3 drops into the right ear 4 (four) times daily. 10 mL 2     "pitavastatin calcium (LIVALO) 1 mg Tab tablet Take 1 tablet (1 mg total) by mouth every other day. 45 tablet 3     No current facility-administered medications for this visit.      Allergies: Levaquin [levofloxacin] and Tramadol     ROS:  Constitutional: no weight loss, weight gain, fever, fatigue  Eyes:  No vision changes, glasses/contacts  ENT/Mouth: No ulcers, sinus problems, ears ringing, headache  Cardiovascular: No inability to lie flat, chest pain, exercise intolerance, swelling, heart palpitations  Respiratory: No wheezing, coughing blood, shortness of breath, or cough  Gastrointestinal: No diarrhea, bloody stool, nausea/vomiting, constipation, gas, hemorrhoids  Genitourinary: No blood in urine, painful urination, urgency of urination, frequency of urination, incomplete emptying, incontinence, abnormal bleeding, painful periods, heavy periods, vaginal discharge, vaginal odor, painful intercourse, sexual problems, bleeding after intercourse.  Musculoskeletal: No muscle weakness  Skin/Breast: No painful breasts, nipple discharge, masses, rash, ulcers  Neurological: No passing out, seizures, numbness, headache  Endocrine: No diabetes, hypothyroid, hyperthyroid, hot flashes, hair loss, abnormal hair growth, ance  Psychiatric: No depression, crying  Hematologic: No bruises, bleeding, swollen lymph nodes, anemia.      OBJECTIVE:   The patient appears well, alert, oriented x 3, in no distress.  BP (!) 140/60 (BP Location: Left arm, Patient Position: Sitting, BP Method: Medium (Manual))   Ht 5' 5" (1.651 m)   Wt 72.1 kg (158 lb 15.2 oz)   BMI 26.45 kg/m²   NECK: no thyromegaly, trachea midline  SKIN: no acne, striae, hirsutism  CHEST: CTAB  CV: RRR  BREAST EXAM: breasts appear normal, no suspicious masses, no skin or nipple changes or axillary nodes  ABDOMEN: no hernias, masses, or hepatosplenomegaly  GENITALIA: normal external genitalia, no erythema, no discharge.  1 mm vulvar cyst on posterior fourchette and " another just below clitoral suero.  URETHRA: normal urethra, normal urethral meatus  VAGINA: Normal  CERVIX: no lesions or cervical motion tenderness  UTERUS: normal size, contour, position, consistency, mobility, non-tender  ADNEXA: no mass, fullness, tenderness      ASSESSMENT:   1. Well female exam with routine gynecological exam     2. Vulvar cyst         PLAN:       Reassured benign vulvar cysts  Return to clinic in 1 year

## 2019-05-10 ENCOUNTER — LAB VISIT (OUTPATIENT)
Dept: LAB | Facility: HOSPITAL | Age: 74
End: 2019-05-10
Attending: INTERNAL MEDICINE
Payer: MEDICARE

## 2019-05-10 ENCOUNTER — OFFICE VISIT (OUTPATIENT)
Dept: INTERNAL MEDICINE | Facility: CLINIC | Age: 74
End: 2019-05-10
Payer: MEDICARE

## 2019-05-10 VITALS
TEMPERATURE: 97 F | RESPIRATION RATE: 11 BRPM | BODY MASS INDEX: 26.88 KG/M2 | SYSTOLIC BLOOD PRESSURE: 121 MMHG | HEIGHT: 64 IN | HEART RATE: 64 BPM | WEIGHT: 157.44 LBS | OXYGEN SATURATION: 95 % | DIASTOLIC BLOOD PRESSURE: 62 MMHG

## 2019-05-10 DIAGNOSIS — I10 HTN (HYPERTENSION), BENIGN: Primary | ICD-10-CM

## 2019-05-10 DIAGNOSIS — E03.9 HYPOTHYROIDISM, UNSPECIFIED TYPE: ICD-10-CM

## 2019-05-10 DIAGNOSIS — E78.5 DYSLIPIDEMIA: ICD-10-CM

## 2019-05-10 LAB — TSH SERPL DL<=0.005 MIU/L-ACNC: 0.86 UIU/ML (ref 0.4–4)

## 2019-05-10 PROCEDURE — 99999 PR PBB SHADOW E&M-EST. PATIENT-LVL IV: CPT | Mod: PBBFAC,,, | Performed by: INTERNAL MEDICINE

## 2019-05-10 PROCEDURE — 99214 PR OFFICE/OUTPT VISIT, EST, LEVL IV, 30-39 MIN: ICD-10-PCS | Mod: S$PBB,,, | Performed by: INTERNAL MEDICINE

## 2019-05-10 PROCEDURE — 36415 COLL VENOUS BLD VENIPUNCTURE: CPT | Mod: PO

## 2019-05-10 PROCEDURE — 99214 OFFICE O/P EST MOD 30 MIN: CPT | Mod: S$PBB,,, | Performed by: INTERNAL MEDICINE

## 2019-05-10 PROCEDURE — 99999 PR PBB SHADOW E&M-EST. PATIENT-LVL IV: ICD-10-PCS | Mod: PBBFAC,,, | Performed by: INTERNAL MEDICINE

## 2019-05-10 PROCEDURE — 99214 OFFICE O/P EST MOD 30 MIN: CPT | Mod: PBBFAC,PO | Performed by: INTERNAL MEDICINE

## 2019-05-10 PROCEDURE — 84443 ASSAY THYROID STIM HORMONE: CPT

## 2019-05-10 RX ORDER — DIAZEPAM 5 MG/1
TABLET ORAL
Qty: 20 TABLET | Refills: 0 | Status: SHIPPED | OUTPATIENT
Start: 2019-05-10 | End: 2020-07-31 | Stop reason: SDUPTHER

## 2019-05-10 RX ORDER — LEVOTHYROXINE SODIUM 88 UG/1
88 TABLET ORAL DAILY
Qty: 90 TABLET | Refills: 3 | Status: SHIPPED | OUTPATIENT
Start: 2019-05-10 | End: 2020-03-24 | Stop reason: SDUPTHER

## 2019-05-14 NOTE — PROGRESS NOTES
History of present illness:  73-year-old lady in today following up on hypertension, dyslipidemia hypothyroidism.  She reports that all is generally doing well.  She is taking all medications as directed.  No issues with blood pressure control with home readings.  No chest pain, palpitations, syncope cough or shortness of breath.    Current medications:  All medications are noted and reviewed in the electronic medical record medication list.  Note that she takes Valium only occasionally primarily as a muscle relaxer.    Review of systems:  Constitutional:  No fever no chills no generalized body aches.  Cardiovascular:  No chest pain palpitations syncope edema claudication.  Respiratory:  No cough shortness of breath.  Neurologic:  No headaches no focal neurological symptomatologies.    Past medical history, past surgical history, family medical history and social history is are all noted reviewed the electronic medical record history sections.    Physical examination:  General:  Alert pleasant appropriately groomed lady no acute distress.  Vital signs:  Blood pressure taken manually by this examiner is 124/62.  HEENT:  Normocephalic.  Neck supple no masses no thyromegaly.  Lungs:  Clear to auscultation.  Cardiovascular:  Normal heart sounds. No significant murmur.  Carotids full bilaterally bruits.  No peripheral extremity edema and no ischemic changes lower extremities.  Mental status:  Alert oriented affect mood all appropriate.    Impression:  Hypertension well controlled.  Dyslipidemia, previous intolerance to other statins currently on pitavastatin at low-dose and tolerating.  Hypothyroidism on replacement therapy.    Plan:  Update TSH in urinalysis.  Return to clinic 6 months.  Refilled prescriptions.

## 2019-06-24 ENCOUNTER — TELEPHONE (OUTPATIENT)
Dept: INTERNAL MEDICINE | Facility: CLINIC | Age: 74
End: 2019-06-24

## 2019-06-24 DIAGNOSIS — E78.2 MIXED HYPERLIPIDEMIA: Primary | ICD-10-CM

## 2019-06-24 NOTE — TELEPHONE ENCOUNTER
----- Message from Ailyn Castillo sent at 6/24/2019  8:51 AM CDT -----  Contact: Self [phone]  Patient has an upcoming lab appointment at the [facility] on 06/25  Please enter lab orders and link them to this appointment.    Thanks,  [facility] 5th floor reception desk

## 2019-06-25 ENCOUNTER — LAB VISIT (OUTPATIENT)
Dept: LAB | Facility: HOSPITAL | Age: 74
End: 2019-06-25
Attending: INTERNAL MEDICINE
Payer: MEDICARE

## 2019-06-25 DIAGNOSIS — E78.2 MIXED HYPERLIPIDEMIA: ICD-10-CM

## 2019-06-25 LAB
CHOLEST SERPL-MCNC: 231 MG/DL (ref 120–199)
CHOLEST/HDLC SERPL: 5.6 {RATIO} (ref 2–5)
HDLC SERPL-MCNC: 41 MG/DL (ref 40–75)
HDLC SERPL: 17.7 % (ref 20–50)
LDLC SERPL CALC-MCNC: 132.6 MG/DL (ref 63–159)
NONHDLC SERPL-MCNC: 190 MG/DL
TRIGL SERPL-MCNC: 287 MG/DL (ref 30–150)

## 2019-06-25 PROCEDURE — 80061 LIPID PANEL: CPT

## 2019-06-25 PROCEDURE — 36415 COLL VENOUS BLD VENIPUNCTURE: CPT | Mod: PO

## 2019-06-28 ENCOUNTER — TELEPHONE (OUTPATIENT)
Dept: INTERNAL MEDICINE | Facility: CLINIC | Age: 74
End: 2019-06-28

## 2019-06-28 RX ORDER — SULFAMETHOXAZOLE AND TRIMETHOPRIM 800; 160 MG/1; MG/1
1 TABLET ORAL 2 TIMES DAILY
Qty: 6 TABLET | Refills: 0 | Status: SHIPPED | OUTPATIENT
Start: 2019-06-28 | End: 2020-01-10

## 2019-06-28 NOTE — TELEPHONE ENCOUNTER
----- Message from Minerva Peralta sent at 6/28/2019  3:23 PM CDT -----  Contact: Pt self Home [phone] or Mobile [phone]  Patient is calling in regards to her saying that she have a possible bladder infection. She said that she have pressure around her vagina and she would like for you to write her a script for it and have it sent to..    Patient's Pharmacy: [facility]/pharmacy #[id number]  [place] [address]  Phone# [phone], fax# [phone]  Comment: Patient said that she's going on a vacation tomorrow.

## 2019-06-28 NOTE — TELEPHONE ENCOUNTER
Left a message for the pt to advise that bactrim has been sent in to her [facility] pharmacy.

## 2019-07-09 ENCOUNTER — OFFICE VISIT (OUTPATIENT)
Dept: GASTROENTEROLOGY | Facility: CLINIC | Age: 74
End: 2019-07-09
Payer: MEDICARE

## 2019-07-09 VITALS
DIASTOLIC BLOOD PRESSURE: 58 MMHG | WEIGHT: 160.5 LBS | HEART RATE: 62 BPM | BODY MASS INDEX: 27.4 KG/M2 | HEIGHT: 64 IN | SYSTOLIC BLOOD PRESSURE: 144 MMHG

## 2019-07-09 DIAGNOSIS — K52.9 CHRONIC DIARRHEA: Primary | ICD-10-CM

## 2019-07-09 PROCEDURE — 99214 OFFICE O/P EST MOD 30 MIN: CPT | Mod: PBBFAC | Performed by: INTERNAL MEDICINE

## 2019-07-09 PROCEDURE — 99214 PR OFFICE/OUTPT VISIT, EST, LEVL IV, 30-39 MIN: ICD-10-PCS | Mod: S$PBB,,, | Performed by: INTERNAL MEDICINE

## 2019-07-09 PROCEDURE — 99214 OFFICE O/P EST MOD 30 MIN: CPT | Mod: S$PBB,,, | Performed by: INTERNAL MEDICINE

## 2019-07-09 PROCEDURE — 99999 PR PBB SHADOW E&M-EST. PATIENT-LVL IV: CPT | Mod: PBBFAC,,, | Performed by: INTERNAL MEDICINE

## 2019-07-09 PROCEDURE — 99999 PR PBB SHADOW E&M-EST. PATIENT-LVL IV: ICD-10-PCS | Mod: PBBFAC,,, | Performed by: INTERNAL MEDICINE

## 2019-07-09 NOTE — PROGRESS NOTES
REASON FOR VISIT:  Diarrhea with urge fecal incontinence.     HISTORY OF PRESENT ILLNESS:  Ms. Gleason is a 73-year-old who has been   complaining of loose bowel movements with urge fecal incontinence, which has not   responded to probiotics or Bentyl.  In the past, she has undergone stool   testing for C. diff, routine cultures, Giardia.  Negative ova, parasites and no   evidence of white cells.  She has also been tested for pancreatic insufficiency   with stool for fecal elastase and was negative.  Prior colonoscopy for screening   was unremarkable.  She had previously seen the surgeon, Dr. Lui, for fecal   urgency and digital rectal exam revealed good tone and defecography was normal   as well.  Previously we had discussed about repeating colonoscopy with biopsies to   evaluate for microscopic colitis, however she did not want to pursue it at this time since her symptoms are well controlled on Lomotil 1 tablet 4 times a day.  She has also been taking Metamucil 1 capsule in the morning.  She has noted that her stools are more formed and she moves her bowels once or twice a day now. She is tolerating Lomotil well with no side effects.     PAST MEDICAL, SURGICAL, SOCIAL AND FAMILY HISTORY:  Reviewed.     MEDICATIONS AND ALLERGIES:  Reviewed.     REVIEW OF SYSTEMS:  CONSTITUTIONAL:  No fever, no chills, no weight loss.  Appetite is normal.  EYES:  No visual changes.  ENT:  No odynophagia or hoarseness of voice.  CARDIOVASCULAR:  No angina or palpitation.  RESPIRATORY:  No shortness of breath or wheezing.  GASTROINTESTINAL:  See HPI.  No blood in the stool.     PHYSICAL EXAMINATION:  VITAL SIGNS:  See EPIC.  GENERAL:  Awake, alert and oriented x3, no acute distress.  Neck:  Supple, no carotid bruits  Cardiovascular:  S1, S2 normal, no murmurs or gallops  Respiratory:  Bilateral air entry equal no rhonchi or crackles  Abdomen:  Soft, nondistended, nontender, no organomegaly appreciated, bowel sounds are normal, no  abdominal bruits heard      About 25 minutes spent with the patient with more than 50% in counseling        IMPRESSION: Chronic diarrhea well controlled on Lomotil 4 times daily.     RECOMMENDATION:  1. Continue metamucil daily and Lomotil four times a day for diarrhea.

## 2019-08-14 RX ORDER — DIPHENOXYLATE HYDROCHLORIDE AND ATROPINE SULFATE 2.5; .025 MG/1; MG/1
TABLET ORAL
Qty: 120 TABLET | Refills: 2 | Status: SHIPPED | OUTPATIENT
Start: 2019-08-14 | End: 2019-11-13 | Stop reason: SDUPTHER

## 2019-08-20 RX ORDER — ESCITALOPRAM OXALATE 10 MG/1
10 TABLET ORAL DAILY
Qty: 90 TABLET | Refills: 3 | Status: SHIPPED | OUTPATIENT
Start: 2019-08-20 | End: 2019-08-21 | Stop reason: SDUPTHER

## 2019-08-20 NOTE — TELEPHONE ENCOUNTER
----- Message from Gee Yan sent at 8/20/2019  9:27 AM CDT -----  Contact: self    Patient is calling for an RX refill or new RX.  Is this a refill or new RX:refill    RX name and strength: escitalopram oxalate (LEXAPRO) 10 MG tablet  Directions (copy/paste from chart):    Is this a 30 day or 90 day RX:    Local pharmacy or mail order pharmacy: [facility] [address] [phone] (Phone)  [phone] (Fax)   Pharmacy name and phone # (copy/paste from chart):     Comments:

## 2019-08-21 RX ORDER — ESCITALOPRAM OXALATE 10 MG/1
10 TABLET ORAL DAILY
Qty: 90 TABLET | Refills: 3 | Status: SHIPPED | OUTPATIENT
Start: 2019-08-21 | End: 2020-07-30 | Stop reason: SDUPTHER

## 2019-08-21 NOTE — TELEPHONE ENCOUNTER
----- Message from Rachelle Wilde sent at 8/21/2019  8:20 AM CDT -----  Contact: Self - Call her at  [phone] or  [phone]  Patient is calling to see if you have corrected her Pharmacy and sent her Escitalopram Oxalate (LEXAPRO) 10 MG tablet to [facility] instead of [facility]. Never should have gone to [facility].      [facility]     [phone] (Phone) or [phone] (Fax)

## 2019-08-21 NOTE — TELEPHONE ENCOUNTER
Pt would like for her medication refill to be sent to [facility].    Please resend.    Thanks.

## 2019-09-20 NOTE — TELEPHONE ENCOUNTER
Last Visit: 8/27/19 with MD Russ Olvera  Next Appointment: 2/25/20 with MD Russ Olvera  Previous Refill Encounter(s): 7/26/18 #30gr with 3 refills    Requested Prescriptions     Pending Prescriptions Disp Refills    nystatin-triamcinolone (MYCOLOG II) topical cream 30 g 3     Sig: Apply  to affected area two (2) times a day. Spoke with pt

## 2019-10-17 ENCOUNTER — TELEPHONE (OUTPATIENT)
Dept: INTERNAL MEDICINE | Facility: CLINIC | Age: 74
End: 2019-10-17

## 2019-10-17 DIAGNOSIS — Z12.31 VISIT FOR SCREENING MAMMOGRAM: Primary | ICD-10-CM

## 2019-10-17 NOTE — TELEPHONE ENCOUNTER
----- Message from Gee Yan sent at 10/17/2019 10:39 AM CDT -----  Contact: self   Caller is requesting to schedule their annual screening mammogram appointment. Order is not listed in Epic.  Please enter order and contact patient to schedule.  Where would they like the mammogram performed?:  [facility]  Would the patient rather receive a phone call back or a response via [facility]?:   Call back  Additional information:

## 2019-11-04 ENCOUNTER — TELEPHONE (OUTPATIENT)
Dept: INTERNAL MEDICINE | Facility: CLINIC | Age: 74
End: 2019-11-04

## 2019-11-04 DIAGNOSIS — E78.2 MIXED HYPERLIPIDEMIA: Primary | ICD-10-CM

## 2019-11-04 DIAGNOSIS — R73.01 IMPAIRED FASTING GLUCOSE: ICD-10-CM

## 2019-11-04 NOTE — TELEPHONE ENCOUNTER
----- Message from Rosio Zavala sent at 11/4/2019  3:03 PM CST -----  Contact: self/[phone]  Patient called in regards needing to talk with Dr Reyes nurse about patient upcoming appointment. Patient stated that she is between moving and would like to talk with Dr Reyes's nurse about it. Please call and advise. Thank you.

## 2019-11-14 RX ORDER — DIPHENOXYLATE HYDROCHLORIDE AND ATROPINE SULFATE 2.5; .025 MG/1; MG/1
TABLET ORAL
Qty: 120 TABLET | Refills: 2 | Status: SHIPPED | OUTPATIENT
Start: 2019-11-14 | End: 2020-02-17 | Stop reason: SDUPTHER

## 2019-12-04 ENCOUNTER — OFFICE VISIT (OUTPATIENT)
Dept: URGENT CARE | Facility: CLINIC | Age: 74
End: 2019-12-04
Payer: MEDICARE

## 2019-12-04 VITALS
DIASTOLIC BLOOD PRESSURE: 90 MMHG | RESPIRATION RATE: 18 BRPM | BODY MASS INDEX: 26.12 KG/M2 | OXYGEN SATURATION: 96 % | HEART RATE: 68 BPM | TEMPERATURE: 97 F | SYSTOLIC BLOOD PRESSURE: 140 MMHG | WEIGHT: 153 LBS | HEIGHT: 64 IN

## 2019-12-04 DIAGNOSIS — R05.9 COUGH: ICD-10-CM

## 2019-12-04 DIAGNOSIS — R03.0 ELEVATED BLOOD PRESSURE READING: ICD-10-CM

## 2019-12-04 DIAGNOSIS — J32.9 SINUSITIS, UNSPECIFIED CHRONICITY, UNSPECIFIED LOCATION: Primary | ICD-10-CM

## 2019-12-04 PROCEDURE — 96372 PR INJECTION,THERAP/PROPH/DIAG2ST, IM OR SUBCUT: ICD-10-PCS | Mod: S$GLB,,, | Performed by: NURSE PRACTITIONER

## 2019-12-04 PROCEDURE — 96372 THER/PROPH/DIAG INJ SC/IM: CPT | Mod: S$GLB,,, | Performed by: NURSE PRACTITIONER

## 2019-12-04 PROCEDURE — 99214 OFFICE O/P EST MOD 30 MIN: CPT | Mod: 25,S$GLB,, | Performed by: NURSE PRACTITIONER

## 2019-12-04 PROCEDURE — 99214 PR OFFICE/OUTPT VISIT, EST, LEVL IV, 30-39 MIN: ICD-10-PCS | Mod: 25,S$GLB,, | Performed by: NURSE PRACTITIONER

## 2019-12-04 RX ORDER — PROMETHAZINE HYDROCHLORIDE AND DEXTROMETHORPHAN HYDROBROMIDE 6.25; 15 MG/5ML; MG/5ML
5 SYRUP ORAL NIGHTLY PRN
Qty: 118 ML | Refills: 0 | Status: SHIPPED | OUTPATIENT
Start: 2019-12-04 | End: 2019-12-14

## 2019-12-04 RX ORDER — AZITHROMYCIN 250 MG/1
TABLET, FILM COATED ORAL
Qty: 6 TABLET | Refills: 0 | Status: SHIPPED | OUTPATIENT
Start: 2019-12-04 | End: 2020-01-10 | Stop reason: ALTCHOICE

## 2019-12-04 RX ORDER — DEXAMETHASONE SODIUM PHOSPHATE 100 MG/10ML
6 INJECTION INTRAMUSCULAR; INTRAVENOUS
Status: COMPLETED | OUTPATIENT
Start: 2019-12-04 | End: 2019-12-04

## 2019-12-04 RX ADMIN — DEXAMETHASONE SODIUM PHOSPHATE 6 MG: 100 INJECTION INTRAMUSCULAR; INTRAVENOUS at 11:12

## 2019-12-04 NOTE — PROGRESS NOTES
"Subjective:       Patient ID: Elizabeth Gleason is a 74 y.o. female.    Vitals:  height is 5' 4" (1.626 m) and weight is 69.4 kg (153 lb). Her oral temperature is 97.2 °F (36.2 °C). Her blood pressure is 140/90 (abnormal) and her pulse is 68. Her respiration is 18 and oxygen saturation is 96%.     Chief Complaint: Sinus Problem    Sinus Problem   This is a new problem. The current episode started 1 to 4 weeks ago (13 days). The problem has been gradually worsening since onset. There has been no fever. Her pain is at a severity of 0/10. She is experiencing no pain. Associated symptoms include coughing, sinus pressure and a sore throat. Pertinent negatives include no chills, congestion, diaphoresis, ear pain or shortness of breath. (Symptoms worse at night ) Treatments tried: TheraFlu, nyQuil, Allegra and VitaminC. The treatment provided no relief.       Constitution: Negative for chills, sweating, fatigue and fever.   HENT: Positive for sinus pain, sinus pressure and sore throat. Negative for ear pain, congestion and voice change.    Neck: Negative for painful lymph nodes.   Eyes: Negative for eye redness.   Respiratory: Positive for cough. Negative for chest tightness, sputum production, bloody sputum, COPD, shortness of breath, stridor, wheezing and asthma.    Gastrointestinal: Negative for nausea and vomiting.   Musculoskeletal: Negative for muscle ache.   Skin: Negative for rash.   Allergic/Immunologic: Negative for seasonal allergies and asthma.   Hematologic/Lymphatic: Negative for swollen lymph nodes.       Objective:      Physical Exam   Constitutional: She is oriented to person, place, and time. She appears well-developed and well-nourished. She is cooperative.  Non-toxic appearance. She does not have a sickly appearance. She does not appear ill. No distress.   HENT:   Head: Normocephalic and atraumatic.   Right Ear: Hearing, tympanic membrane, external ear and ear canal normal.   Left Ear: Hearing, " tympanic membrane, external ear and ear canal normal.   Nose: Mucosal edema and rhinorrhea present. No nasal deformity. No epistaxis. Right sinus exhibits maxillary sinus tenderness and frontal sinus tenderness. Left sinus exhibits maxillary sinus tenderness and frontal sinus tenderness.   Mouth/Throat: Uvula is midline, oropharynx is clear and moist and mucous membranes are normal. No trismus in the jaw. Normal dentition. No uvula swelling. No oropharyngeal exudate, posterior oropharyngeal edema or posterior oropharyngeal erythema.   Eyes: Conjunctivae and lids are normal. No scleral icterus.   Neck: Trachea normal, full passive range of motion without pain and phonation normal. Neck supple. No neck rigidity. No edema and no erythema present.   Cardiovascular: Normal rate, regular rhythm, normal heart sounds, intact distal pulses and normal pulses.   Pulmonary/Chest: Effort normal and breath sounds normal. No respiratory distress. She has no decreased breath sounds. She has no rhonchi.   Musculoskeletal: Normal range of motion. She exhibits no edema or deformity.   Lymphadenopathy:     She has cervical adenopathy.        Right cervical: Superficial cervical adenopathy present. No deep cervical and no posterior cervical adenopathy present.       Left cervical: Superficial cervical adenopathy present. No deep cervical and no posterior cervical adenopathy present.   Neurological: She is alert and oriented to person, place, and time. She exhibits normal muscle tone. Coordination normal.   Skin: Skin is warm, dry, intact, not diaphoretic and not pale.   Psychiatric: She has a normal mood and affect. Her speech is normal and behavior is normal. Judgment and thought content normal. Cognition and memory are normal.   Nursing note and vitals reviewed.        Assessment:       1. Sinusitis, unspecified chronicity, unspecified location    2. Cough    3. Elevated blood pressure reading        Plan:         Sinusitis,  "unspecified chronicity, unspecified location  -     azithromycin (ZITHROMAX Z-CORAL) 250 MG tablet; Take 2 tablets (500 mg) on  Day 1,  followed by 1 tablet (250 mg) once daily on Days 2 through 5.  Dispense: 6 tablet; Refill: 0  -     dexamethasone injection 6 mg    Cough  -     promethazine-dextromethorphan (PROMETHAZINE-DM) 6.25-15 mg/5 mL Syrp; Take 5 mLs by mouth nightly as needed.  Dispense: 118 mL; Refill: 0    Elevated blood pressure reading      Patient Instructions     Please follow up with your Primary care provider within 2-5 days if your signs and symptoms have not resolved or worsen.  The usual course of cold symptoms are 10-14 days.     If your condition worsens or fails to improve we recommend that you receive another evaluation at the emergency room immediately or contact your primary medical clinic to discuss your concerns.     You must understand that you have received an Urgent Care treatment only and that you may be released before all of your medical problems are known or treated.   You, the patient, will arrange for follow up care as instructed.     Tylenol or Ibuprofen can also be used as directed for pain/fever unless you have an allergy to them or medical condition such as stomach ulcers, kidney or liver disease or blood thinners etc for which you should not be taking these type of medications.     Take over the counter cough medication as directed as needed for cough.  You should avoid medications with pseudoephedrine or phenylephrine (any medication with "D") if you have high blood pressure as this can cause an elevation in your blood pressure. Instead consider Corcidin HBP as needed to prevent an elevated blood pressure.     Natural remedies of symptoms (as needed) include humidification, saline nasal sprays, and/or steamy showers.  Increase fluids, warm tea with honey, cough drops as needed.  You may also use salt water gargles for sore throat.    IF you received a steroid shot today - As " discussed, this can elevate your blood pressure, elevate your blood sugar, water weight gain, nervous energy, redness to the face and dimpling of the skin at the injection site.     You have been given an antibiotic to treat your condition today.  Please complete the antibiotic as directed on the bottle.     As with any antibiotics, use probiotics and/or high culture yogurt about 2 hours apart from the antibiotic and about 1 week after the antibiotic to replace the gut gisele lost with antibiotic use.      If you are female and on BCP use additional methods to prevent pregnancy while on antibiotics and for one cycle after.     Elevated Blood Pressure    Your blood pressure was elevated during your visit to the urgent care.     It was not so high that immediate care was needed but it is recommended that you monitor your blood pressure over the next week or two to make sure that it is not staying elevated.      Please have your blood pressure taken 2-3 times daily at different times of the day.  Write all of those blood pressures down and record the time that they were taken.     Keep all that information and take it with you to see your Primary Care Physician.     If you are taking antihypertensives, please continue your medication regularly as prescribed.      If your blood pressure is consistently above 140/90 you will need to follow up with your PCP more quickly.     - According to ACEP guidelines, workup and treatment of hypertension in asymptomatic patient is not warranted in the ED:    (1) Initiating treatment for asymptomatic hypertension in the ED is not necessary when patients have follow-up.    (2) Rapidly lowering blood pressure in asymptomatic patients in the ED is unnecessary and may be harmful in some patients.    (3) When ED treatment for asymptomatic hypertension is initiated, blood pressure management should attempt to gradually lower blood pressure and should not be expected to be normalized during  the initial ED visit.        Call your doctor immediately or seek emergency care if you have any of the following:  · Chest pain or shortness of breath (call 911)  · Moderate to severe headache  · Weakness in the muscles of your face, arms, or legs  · Trouble speaking  · Extreme drowsiness  · Confusion  · Fainting or dizziness  · Pulsating or rushing sound in your ears  · Unexplained nosebleed  · Weakness, tingling, or numbness of your face, arms, or legs  · Change in vision  · Blood pressure measured at home that is greater than 180/110     Please consider DASH diet program ([employer] Web site) for patients with hypertension as it is the only diet approved to improve blood pressures:     https://www.nhlbi.nih.gov/health-topics/dash-eating-plan      Sinusitis (Antibiotic Treatment)    The sinuses are air-filled spaces within the bones of the face. They connect to the inside of the nose. Sinusitis is an inflammation of the tissue lining the sinus cavity. Sinus inflammation can occur during a cold. It can also be due to allergies to pollens and other particles in the air. Sinusitis can cause symptoms of sinus congestion and fullness. A sinus infection causes fever, headache and facial pain. There is often green or yellow drainage from the nose or into the back of the throat (post-nasal drip). You have been given antibiotics to treat this condition.  Home care:  · Take the full course of antibiotics as instructed. Do not stop taking them, even if you feel better.  · Drink plenty of water, hot tea, and other liquids. This may help thin mucus. It also may promote sinus drainage.  · Heat may help soothe painful areas of the face. Use a towel soaked in hot water. Or,  the shower and direct the hot spray onto your face. Using a vaporizer along with a menthol rub at night may also help.   · An expectorant containing guaifenesin may help thin the mucus and promote drainage from the  sinuses.  · Over-the-counter decongestants may be used unless a similar medicine was prescribed. Nasal sprays work the fastest. Use one that contains phenylephrine or oxymetazoline. First blow the nose gently. Then use the spray. Do not use these medicines more often than directed on the label or symptoms may get worse. You may also use tablets containing pseudoephedrine. Avoid products that combine ingredients, because side effects may be increased. Read labels. You can also ask the pharmacist for help. (NOTE: Persons with high blood pressure should not use decongestants. They can raise blood pressure.)  · Over-the-counter antihistamines may help if allergies contributed to your sinusitis.    · Do not use nasal rinses or irrigation during an acute sinus infection, unless told to by your health care provider. Rinsing may spread the infection to other sinuses.  · Use acetaminophen or ibuprofen to control pain, unless another pain medicine was prescribed. (If you have chronic liver or kidney disease or ever had a stomach ulcer, talk with your doctor before using these medicines. Aspirin should never be used in anyone under 18 years of age who is ill with a fever. It may cause severe liver damage.)  · Don't smoke. This can worsen symptoms.  Follow-up care  Follow up with your healthcare provider or our staff if you are not improving within the next week.  When to seek medical advice  Call your healthcare provider if any of these occur:  · Facial pain or headache becoming more severe  · Stiff neck  · Unusual drowsiness or confusion  · Swelling of the forehead or eyelids  · Vision problems, including blurred or double vision  · Fever of 100.4ºF (38ºC) or higher, or as directed by your healthcare provider  · Seizure  · Breathing problems  · Symptoms not resolving within 10 days  Date Last Reviewed: 4/13/2015  © 8887-0657 [employer]. [address] [place] [address], [place] [address]. All rights reserved. This  information is not intended as a substitute for professional medical care. Always follow your healthcare professional's instructions.

## 2019-12-04 NOTE — PATIENT INSTRUCTIONS
"Please follow up with your Primary care provider within 2-5 days if your signs and symptoms have not resolved or worsen.  The usual course of cold symptoms are 10-14 days.     If your condition worsens or fails to improve we recommend that you receive another evaluation at the emergency room immediately or contact your primary medical clinic to discuss your concerns.     You must understand that you have received an Urgent Care treatment only and that you may be released before all of your medical problems are known or treated.   You, the patient, will arrange for follow up care as instructed.     Tylenol or Ibuprofen can also be used as directed for pain/fever unless you have an allergy to them or medical condition such as stomach ulcers, kidney or liver disease or blood thinners etc for which you should not be taking these type of medications.     Take over the counter cough medication as directed as needed for cough.  You should avoid medications with pseudoephedrine or phenylephrine (any medication with "D") if you have high blood pressure as this can cause an elevation in your blood pressure. Instead consider Corcidin HBP as needed to prevent an elevated blood pressure.     Natural remedies of symptoms (as needed) include humidification, saline nasal sprays, and/or steamy showers.  Increase fluids, warm tea with honey, cough drops as needed.  You may also use salt water gargles for sore throat.    IF you received a steroid shot today - As discussed, this can elevate your blood pressure, elevate your blood sugar, water weight gain, nervous energy, redness to the face and dimpling of the skin at the injection site.     You have been given an antibiotic to treat your condition today.  Please complete the antibiotic as directed on the bottle.     As with any antibiotics, use probiotics and/or high culture yogurt about 2 hours apart from the antibiotic and about 1 week after the antibiotic to replace the gut gisele " lost with antibiotic use.      If you are female and on BCP use additional methods to prevent pregnancy while on antibiotics and for one cycle after.     Elevated Blood Pressure    Your blood pressure was elevated during your visit to the urgent care.     It was not so high that immediate care was needed but it is recommended that you monitor your blood pressure over the next week or two to make sure that it is not staying elevated.      Please have your blood pressure taken 2-3 times daily at different times of the day.  Write all of those blood pressures down and record the time that they were taken.     Keep all that information and take it with you to see your Primary Care Physician.     If you are taking antihypertensives, please continue your medication regularly as prescribed.      If your blood pressure is consistently above 140/90 you will need to follow up with your PCP more quickly.     - According to ACEP guidelines, workup and treatment of hypertension in asymptomatic patient is not warranted in the ED:    (1) Initiating treatment for asymptomatic hypertension in the ED is not necessary when patients have follow-up.    (2) Rapidly lowering blood pressure in asymptomatic patients in the ED is unnecessary and may be harmful in some patients.    (3) When ED treatment for asymptomatic hypertension is initiated, blood pressure management should attempt to gradually lower blood pressure and should not be expected to be normalized during the initial ED visit.        Call your doctor immediately or seek emergency care if you have any of the following:  · Chest pain or shortness of breath (call 911)  · Moderate to severe headache  · Weakness in the muscles of your face, arms, or legs  · Trouble speaking  · Extreme drowsiness  · Confusion  · Fainting or dizziness  · Pulsating or rushing sound in your ears  · Unexplained nosebleed  · Weakness, tingling, or numbness of your face, arms, or legs  · Change in  vision  · Blood pressure measured at home that is greater than 180/110     Please consider DASH diet program ([employer] Web site) for patients with hypertension as it is the only diet approved to improve blood pressures:     https://www.nhlbi.nih.gov/health-topics/dash-eating-plan      Sinusitis (Antibiotic Treatment)    The sinuses are air-filled spaces within the bones of the face. They connect to the inside of the nose. Sinusitis is an inflammation of the tissue lining the sinus cavity. Sinus inflammation can occur during a cold. It can also be due to allergies to pollens and other particles in the air. Sinusitis can cause symptoms of sinus congestion and fullness. A sinus infection causes fever, headache and facial pain. There is often green or yellow drainage from the nose or into the back of the throat (post-nasal drip). You have been given antibiotics to treat this condition.  Home care:  · Take the full course of antibiotics as instructed. Do not stop taking them, even if you feel better.  · Drink plenty of water, hot tea, and other liquids. This may help thin mucus. It also may promote sinus drainage.  · Heat may help soothe painful areas of the face. Use a towel soaked in hot water. Or,  the shower and direct the hot spray onto your face. Using a vaporizer along with a menthol rub at night may also help.   · An expectorant containing guaifenesin may help thin the mucus and promote drainage from the sinuses.  · Over-the-counter decongestants may be used unless a similar medicine was prescribed. Nasal sprays work the fastest. Use one that contains phenylephrine or oxymetazoline. First blow the nose gently. Then use the spray. Do not use these medicines more often than directed on the label or symptoms may get worse. You may also use tablets containing pseudoephedrine. Avoid products that combine ingredients, because side effects may be increased. Read labels. You can also ask the  pharmacist for help. (NOTE: Persons with high blood pressure should not use decongestants. They can raise blood pressure.)  · Over-the-counter antihistamines may help if allergies contributed to your sinusitis.    · Do not use nasal rinses or irrigation during an acute sinus infection, unless told to by your health care provider. Rinsing may spread the infection to other sinuses.  · Use acetaminophen or ibuprofen to control pain, unless another pain medicine was prescribed. (If you have chronic liver or kidney disease or ever had a stomach ulcer, talk with your doctor before using these medicines. Aspirin should never be used in anyone under 18 years of age who is ill with a fever. It may cause severe liver damage.)  · Don't smoke. This can worsen symptoms.  Follow-up care  Follow up with your healthcare provider or our staff if you are not improving within the next week.  When to seek medical advice  Call your healthcare provider if any of these occur:  · Facial pain or headache becoming more severe  · Stiff neck  · Unusual drowsiness or confusion  · Swelling of the forehead or eyelids  · Vision problems, including blurred or double vision  · Fever of 100.4ºF (38ºC) or higher, or as directed by your healthcare provider  · Seizure  · Breathing problems  · Symptoms not resolving within 10 days  Date Last Reviewed: 4/13/2015  © 0730-7712 [employer]. [address] [place] [address], [place] [address]. All rights reserved. This information is not intended as a substitute for professional medical care. Always follow your healthcare professional's instructions.

## 2019-12-07 ENCOUNTER — TELEPHONE (OUTPATIENT)
Dept: URGENT CARE | Facility: CLINIC | Age: 74
End: 2019-12-07

## 2019-12-16 ENCOUNTER — HOSPITAL ENCOUNTER (OUTPATIENT)
Dept: RADIOLOGY | Facility: HOSPITAL | Age: 74
Discharge: HOME | End: 2019-12-16
Attending: INTERNAL MEDICINE
Payer: MEDICARE

## 2019-12-16 DIAGNOSIS — Z12.31 VISIT FOR SCREENING MAMMOGRAM: ICD-10-CM

## 2019-12-16 PROCEDURE — 77067 MAMMO DIGITAL SCREENING BILAT WITH TOMOSYNTHESIS_CAD: ICD-10-PCS | Mod: 26,,, | Performed by: RADIOLOGY

## 2019-12-16 PROCEDURE — 77067 SCR MAMMO BI INCL CAD: CPT | Mod: 26,,, | Performed by: RADIOLOGY

## 2019-12-16 PROCEDURE — 77067 SCR MAMMO BI INCL CAD: CPT | Mod: TC,PO

## 2019-12-16 PROCEDURE — 77063 BREAST TOMOSYNTHESIS BI: CPT | Mod: 26,,, | Performed by: RADIOLOGY

## 2019-12-16 PROCEDURE — 77063 MAMMO DIGITAL SCREENING BILAT WITH TOMOSYNTHESIS_CAD: ICD-10-PCS | Mod: 26,,, | Performed by: RADIOLOGY

## 2020-01-06 ENCOUNTER — PATIENT OUTREACH (OUTPATIENT)
Dept: ADMINISTRATIVE | Facility: OTHER | Age: 75
End: 2020-01-06

## 2020-01-08 ENCOUNTER — OFFICE VISIT (OUTPATIENT)
Dept: PODIATRY | Facility: CLINIC | Age: 75
End: 2020-01-08
Payer: MEDICARE

## 2020-01-08 VITALS
RESPIRATION RATE: 16 BRPM | SYSTOLIC BLOOD PRESSURE: 142 MMHG | DIASTOLIC BLOOD PRESSURE: 57 MMHG | BODY MASS INDEX: 26.29 KG/M2 | WEIGHT: 154 LBS | HEIGHT: 64 IN | HEART RATE: 64 BPM

## 2020-01-08 DIAGNOSIS — L60.1 ONYCHOLYSIS: ICD-10-CM

## 2020-01-08 DIAGNOSIS — B35.1 ONYCHOMYCOSIS DUE TO DERMATOPHYTE: Primary | ICD-10-CM

## 2020-01-08 PROCEDURE — 1159F MED LIST DOCD IN RCRD: CPT | Mod: ,,, | Performed by: PODIATRIST

## 2020-01-08 PROCEDURE — 99999 PR PBB SHADOW E&M-EST. PATIENT-LVL IV: ICD-10-PCS | Mod: PBBFAC,,, | Performed by: PODIATRIST

## 2020-01-08 PROCEDURE — 99203 PR OFFICE/OUTPT VISIT, NEW, LEVL III, 30-44 MIN: ICD-10-PCS | Mod: S$PBB,,, | Performed by: PODIATRIST

## 2020-01-08 PROCEDURE — 1126F PR PAIN SEVERITY QUANTIFIED, NO PAIN PRESENT: ICD-10-PCS | Mod: ,,, | Performed by: PODIATRIST

## 2020-01-08 PROCEDURE — 1159F PR MEDICATION LIST DOCUMENTED IN MEDICAL RECORD: ICD-10-PCS | Mod: ,,, | Performed by: PODIATRIST

## 2020-01-08 PROCEDURE — 99214 OFFICE O/P EST MOD 30 MIN: CPT | Mod: PBBFAC,PN | Performed by: PODIATRIST

## 2020-01-08 PROCEDURE — 99999 PR PBB SHADOW E&M-EST. PATIENT-LVL IV: CPT | Mod: PBBFAC,,, | Performed by: PODIATRIST

## 2020-01-08 PROCEDURE — 1126F AMNT PAIN NOTED NONE PRSNT: CPT | Mod: ,,, | Performed by: PODIATRIST

## 2020-01-08 PROCEDURE — 99203 OFFICE O/P NEW LOW 30 MIN: CPT | Mod: S$PBB,,, | Performed by: PODIATRIST

## 2020-01-08 RX ORDER — CLOTRIMAZOLE 1 G/ML
SOLUTION TOPICAL 2 TIMES DAILY
Qty: 10 ML | Refills: 3 | Status: SHIPPED | OUTPATIENT
Start: 2020-01-08 | End: 2020-09-22

## 2020-01-08 NOTE — PROGRESS NOTES
Subjective:      Patient ID: Elizabeth Gleason is a 74 y.o. female.    Chief Complaint: Nail Problem (B/L big toes) and PCP visit (Dr. Reyes next visit 1/10/20)    74 y.o. female presenting with thickened, discolored, elongated and painful nails on both hallux.  Denied previous treatment other than over-the-counter antifungal medication.  Would like to know different treatment options for thickened and discolored nail.     Review of Systems   Constitution: Negative for chills, decreased appetite, fever and malaise/fatigue.   HENT: Negative for congestion, ear discharge and sore throat.    Eyes: Negative for discharge and pain.   Cardiovascular: Negative for chest pain, claudication and leg swelling.   Respiratory: Negative for cough and shortness of breath.    Skin: Positive for color change. Negative for nail changes and rash.   Musculoskeletal: Negative for arthritis, joint pain, joint swelling and muscle weakness.   Gastrointestinal: Negative for bloating, abdominal pain, diarrhea, nausea and vomiting.   Genitourinary: Negative for flank pain and hematuria.   Neurological: Negative for headaches, numbness and weakness.   Psychiatric/Behavioral: Negative for altered mental status.             Past Medical History:   Diagnosis Date    ALLERGIC RHINITIS     Carotid artery narrowing     rt worse than the left    Chronic diarrhea     GERD (gastroesophageal reflux disease)     HEARING LOSS     Hyperlipidemia     Hypertension     Hypothyroid     Impaired fasting glucose     Migraine headache     Osteopenia     Tuberculosis     childhood TB       Past Surgical History:   Procedure Laterality Date    COLONOSCOPY N/A 8/25/2017    Procedure: COLONOSCOPY;  Surgeon: Keny Lui MD;  Location: [address];  Service: Endoscopy;  Laterality: N/A;    left 5th toe surgery      rt ear surgery      rt lung lobectomy and a rib removed      secondary to TB    TUBAL LIGATION         Family History    Problem Relation Age of Onset    Heart disease Mother         cabg    Kidney disease Mother         was on dialysis    Stroke Father     Cancer Brother         lung cancer    Colon cancer Maternal Aunt     Breast cancer Cousin         paternal FIRST cousin    Ovarian cancer Neg Hx     Esophageal cancer Neg Hx        Social History     Socioeconomic History    Marital status:      Spouse name: Not on file    Number of children: Not on file    Years of education: Not on file    Highest education level: Not on file   Occupational History    Occupation: retired teller   Social Needs    Financial resource strain: Not on file    Food insecurity:     Worry: Not on file     Inability: Not on file    Transportation needs:     Medical: Not on file     Non-medical: Not on file   Tobacco Use    Smoking status: Never Smoker    Smokeless tobacco: Never Used   Substance and Sexual Activity    Alcohol use: Yes     Alcohol/week: 0.0 standard drinks     Comment: occasionally    Drug use: No    Sexual activity: Never   Lifestyle    Physical activity:     Days per week: Not on file     Minutes per session: Not on file    Stress: Not on file   Relationships    Social connections:     Talks on phone: Not on file     Gets together: Not on file     Attends Yarsani service: Not on file     Active member of club or organization: Not on file     Attends meetings of clubs or organizations: Not on file     Relationship status: Not on file   Other Topics Concern    Not on file   Social History Narrative    She exercises regularly.       Current Outpatient Medications   Medication Sig Dispense Refill    aspirin (ECOTRIN) 81 MG EC tablet Take 81 mg by mouth once daily.      azithromycin (ZITHROMAX Z-CORAL) 250 MG tablet Take 2 tablets (500 mg) on  Day 1,  followed by 1 tablet (250 mg) once daily on Days 2 through 5. 6 tablet 0    blood sugar diagnostic Strp 1 freestyle lite test strip weekly 50 strip 11     diazePAM (VALIUM) 5 MG tablet 1 po qd prn 20 tablet 0    diphenoxylate-atropine 2.5-0.025 mg (LOMOTIL) 2.5-0.025 mg per tablet TAKE 1 TABLET BY MOUTH 4 (FOUR) TIMES DAILY AS NEEDED FOR DIARRHEA. 120 tablet 2    escitalopram oxalate (LEXAPRO) 10 MG tablet Take 1 tablet (10 mg total) by mouth once daily. 90 tablet 3    fexofenadine (ALLEGRA) 180 MG tablet Take 1 tablet (180 mg total) by mouth once daily. 90 tablet 3    lancets Misc To check BG 1 times daily, to use with insurance preferred meter 50 each 11    levothyroxine (SYNTHROID) 88 MCG tablet Take 1 tablet (88 mcg total) by mouth once daily. 90 tablet 3    losartan-hydrochlorothiazide 50-12.5 mg (HYZAAR) 50-12.5 mg per tablet Take 1 tablet by mouth once daily. 90 tablet 3    multivit-min/iron/folic/lutein (CENTRUM SILVER WOMEN ORAL) Take by mouth.      neomycin-polymyxin-hydrocortisone (CORTISPORIN) 3.5-10,000-1 mg/mL-unit/mL-% otic suspension Place 3 drops into the right ear 4 (four) times daily. 10 mL 2    pitavastatin calcium (LIVALO) 1 mg Tab tablet Take 1 tablet (1 mg total) by mouth every other day. 45 tablet 3    psyllium (METAMUCIL) packet Take 1 packet by mouth once daily.      blood-glucose meter (FREESTYLE FREEDOM LITE) kit Use as instructed 1 each 0    clotrimazole (LOTRIMIN) 1 % Soln Apply topically 2 (two) times daily. 10 mL 3    fluticasone (FLONASE) 50 mcg/actuation nasal spray 1 spray (50 mcg total) by Each Nare route once daily. (Patient not taking: Reported on 12/4/2019) 16 g 3    sulfamethoxazole-trimethoprim 800-160mg (BACTRIM DS) 800-160 mg Tab Take 1 tablet by mouth 2 (two) times daily. (Patient not taking: Reported on 12/4/2019) 6 tablet 0     No current facility-administered medications for this visit.        Review of patient's allergies indicates:   Allergen Reactions    Levaquin [levofloxacin]      Heart raced and felt faint    Tramadol Nausea And Vomiting       Vitals:    01/08/20 0936   BP: (!) 142/57   Pulse: 64  "  Resp: 16   Weight: 69.9 kg (154 lb)   Height: 5' 4" (1.626 m)   PainSc: 0-No pain       Objective:      Physical Exam   Constitutional: She is oriented to person, place, and time. She appears well-developed and well-nourished. No distress.   HENT:   Nose: Nose normal.   Eyes: Conjunctivae are normal.   Neck: Normal range of motion.   Pulmonary/Chest: Effort normal. She exhibits no tenderness.   Abdominal: There is no tenderness.   Neurological: She is alert and oriented to person, place, and time.   Psychiatric: She has a normal mood and affect. Her behavior is normal.       Vascular: Distal DP/PT pulses palpable 2/4. CRT < 3 sec to tips of toes. No vericosities noted to LEs. Hair growth present LE, warm to touch LE, No edema noted to LE.    Dermatologic: No open lesions, lacerations or wounds. Interdigital spaces clean, dry and intact. No erythema, rubor, calor noted LE  Toenails 1 bilaterally are thickened by 2-3 mm, discolored/yellowed, dystrophic, brittle with subungual debris. No incurvation. Mild xerosis noted, bilat.   Right hallux:  Partial detachment of the nail plate from the nail bed. No signs of trauma, no signs of subungual hematoma.     Musculoskeletal: MMT 5/5 in DF/PF/Inv/Ev resistance with no reproduction of pain in any direction. Passive range of motion of ankle and pedal joints is painless. No calf tenderness LE, Compartments soft/compressible.     Neurological: Light touch, proprioception, and sharp/dull sensation are all intact. Protective threshold with the [place]-Wienstein monofilament is intact. Vibratory sensation intact.         Assessment:       Encounter Diagnoses   Name Primary?    Onychomycosis due to dermatophyte Yes    Onycholysis          Plan:       Elizabeth was seen today for nail problem and pcp visit.    Diagnoses and all orders for this visit:    Onychomycosis due to dermatophyte    Onycholysis    Other orders  -     clotrimazole (LOTRIMIN) 1 % Soln; Apply topically 2 (two) " times daily.      I counseled the patient on her conditions, their implications and medical management.    74 y.o. female with bilateral hallux onychomycosis, right hallux onycholysis    -Rx clotrimazole  -different treatment options for fungal nail discussed with the patient.  -Instructed patient on the importance of keeping feet dry. Patient instructed to use absorbent cotton socks and change them if they become sweaty; or wear an open-toe shoe or sandal. Wash the feet at least once a day with soap and water. Patient instructed to use lysol or over-the-counter antifungal powders or sprays to shoes daily and allow them to air dry, switching shoes from every other day would be optimal. Patient is to avoid barefoot walking in high-risk environments (public showers, gyms and locker rooms) may prevent future infections.   -The nature of the condition, options for management, as well as potential risks and complications were discussed in detail with patient. Patient was amenable to my recommendations and left my office fully informed and will follow up as instructed or sooner if necessary.    -f/u prn         Note dictated with voice recognition software, please excuse any grammatical errors.

## 2020-01-10 ENCOUNTER — OFFICE VISIT (OUTPATIENT)
Dept: INTERNAL MEDICINE | Facility: CLINIC | Age: 75
End: 2020-01-10
Payer: MEDICARE

## 2020-01-10 VITALS
SYSTOLIC BLOOD PRESSURE: 130 MMHG | DIASTOLIC BLOOD PRESSURE: 68 MMHG | HEIGHT: 64 IN | BODY MASS INDEX: 24.84 KG/M2 | RESPIRATION RATE: 16 BRPM | HEIGHT: 64 IN | BODY MASS INDEX: 26.46 KG/M2 | TEMPERATURE: 98 F | SYSTOLIC BLOOD PRESSURE: 156 MMHG | HEART RATE: 67 BPM | HEART RATE: 76 BPM | WEIGHT: 145.5 LBS | WEIGHT: 155 LBS | DIASTOLIC BLOOD PRESSURE: 62 MMHG

## 2020-01-10 DIAGNOSIS — I10 ESSENTIAL HYPERTENSION: ICD-10-CM

## 2020-01-10 DIAGNOSIS — M85.80 OSTEOPENIA, UNSPECIFIED LOCATION: ICD-10-CM

## 2020-01-10 DIAGNOSIS — G44.229 CHRONIC TENSION-TYPE HEADACHE, NOT INTRACTABLE: ICD-10-CM

## 2020-01-10 DIAGNOSIS — R73.01 IMPAIRED FASTING GLUCOSE: ICD-10-CM

## 2020-01-10 DIAGNOSIS — I70.0 AORTIC ATHEROSCLEROSIS: ICD-10-CM

## 2020-01-10 DIAGNOSIS — F06.4 ANXIETY DISORDER DUE TO KNOWN PHYSIOLOGICAL CONDITION: ICD-10-CM

## 2020-01-10 DIAGNOSIS — E03.9 ACQUIRED HYPOTHYROIDISM: ICD-10-CM

## 2020-01-10 DIAGNOSIS — E78.5 DYSLIPIDEMIA: ICD-10-CM

## 2020-01-10 DIAGNOSIS — I77.9 CAROTID ARTERY DISORDER: ICD-10-CM

## 2020-01-10 DIAGNOSIS — I10 ESSENTIAL HYPERTENSION: Primary | ICD-10-CM

## 2020-01-10 DIAGNOSIS — Z00.00 ENCOUNTER FOR PREVENTIVE HEALTH EXAMINATION: Primary | ICD-10-CM

## 2020-01-10 PROCEDURE — 1159F PR MEDICATION LIST DOCUMENTED IN MEDICAL RECORD: ICD-10-PCS | Mod: ,,, | Performed by: INTERNAL MEDICINE

## 2020-01-10 PROCEDURE — G0439 PR MEDICARE ANNUAL WELLNESS SUBSEQUENT VISIT: ICD-10-PCS | Mod: S$GLB,,, | Performed by: NURSE PRACTITIONER

## 2020-01-10 PROCEDURE — 99999 PR PBB SHADOW E&M-EST. PATIENT-LVL III: ICD-10-PCS | Mod: PBBFAC,,, | Performed by: INTERNAL MEDICINE

## 2020-01-10 PROCEDURE — 99999 PR PBB SHADOW E&M-EST. PATIENT-LVL V: CPT | Mod: PBBFAC,,, | Performed by: NURSE PRACTITIONER

## 2020-01-10 PROCEDURE — 99213 PR OFFICE/OUTPT VISIT, EST, LEVL III, 20-29 MIN: ICD-10-PCS | Mod: S$PBB,,, | Performed by: INTERNAL MEDICINE

## 2020-01-10 PROCEDURE — 1126F PR PAIN SEVERITY QUANTIFIED, NO PAIN PRESENT: ICD-10-PCS | Mod: ,,, | Performed by: INTERNAL MEDICINE

## 2020-01-10 PROCEDURE — 99215 OFFICE O/P EST HI 40 MIN: CPT | Mod: PBBFAC,27,PO | Performed by: NURSE PRACTITIONER

## 2020-01-10 PROCEDURE — 99213 OFFICE O/P EST LOW 20 MIN: CPT | Mod: S$PBB,,, | Performed by: INTERNAL MEDICINE

## 2020-01-10 PROCEDURE — 99999 PR PBB SHADOW E&M-EST. PATIENT-LVL III: CPT | Mod: PBBFAC,,, | Performed by: INTERNAL MEDICINE

## 2020-01-10 PROCEDURE — 99213 OFFICE O/P EST LOW 20 MIN: CPT | Mod: PBBFAC,PO | Performed by: INTERNAL MEDICINE

## 2020-01-10 PROCEDURE — 99999 PR PBB SHADOW E&M-EST. PATIENT-LVL V: ICD-10-PCS | Mod: PBBFAC,,, | Performed by: NURSE PRACTITIONER

## 2020-01-10 PROCEDURE — 1126F AMNT PAIN NOTED NONE PRSNT: CPT | Mod: ,,, | Performed by: INTERNAL MEDICINE

## 2020-01-10 PROCEDURE — G0439 PPPS, SUBSEQ VISIT: HCPCS | Mod: S$GLB,,, | Performed by: NURSE PRACTITIONER

## 2020-01-10 PROCEDURE — 1159F MED LIST DOCD IN RCRD: CPT | Mod: ,,, | Performed by: INTERNAL MEDICINE

## 2020-01-10 RX ORDER — NEOMYCIN SULFATE, POLYMYXIN B SULFATE AND HYDROCORTISONE 10; 3.5; 1 MG/ML; MG/ML; [USP'U]/ML
3 SUSPENSION/ DROPS AURICULAR (OTIC) 4 TIMES DAILY
Qty: 10 ML | Refills: 2 | Status: SHIPPED | OUTPATIENT
Start: 2020-01-10 | End: 2022-03-08 | Stop reason: SDUPTHER

## 2020-01-10 RX ORDER — PITAVASTATIN CALCIUM 1.04 MG/1
1 TABLET, FILM COATED ORAL EVERY OTHER DAY
Qty: 45 TABLET | Refills: 3 | OUTPATIENT
Start: 2020-01-10 | End: 2020-03-24 | Stop reason: SDUPTHER

## 2020-01-10 RX ORDER — LOSARTAN POTASSIUM AND HYDROCHLOROTHIAZIDE 12.5; 5 MG/1; MG/1
1 TABLET ORAL DAILY
Qty: 90 TABLET | Refills: 3 | Status: SHIPPED | OUTPATIENT
Start: 2020-01-10 | End: 2020-09-08 | Stop reason: DRUGHIGH

## 2020-01-10 NOTE — PROGRESS NOTES
"Elizabeth Gleason presented for a  Medicare AWV and comprehensive Health Risk Assessment today. The following components were reviewed and updated:    · Medical history  · Family History  · Social history  · Allergies and Current Medications  · Health Risk Assessment  · Health Maintenance  · Care Team     ** See Completed Assessments for Annual Wellness Visit within the encounter summary.**       The following assessments were completed:  · Living Situation  · CAGE  · Depression Screening  · Timed Get Up and Go  · Whisper Test  · Cognitive Function Screening      ·   · Nutrition Screening  · ADL Screening  · PAQ Screening    Vitals:    01/10/20 1255   BP: 130/68   BP Location: Left arm   Pulse: 67   Weight: 70.3 kg (154 lb 15.7 oz)   Height: 5' 4" (1.626 m)     Body mass index is 26.6 kg/m².  Physical Exam   Constitutional: She is oriented to person, place, and time. She appears well-developed and well-nourished.   Musculoskeletal: Normal range of motion.   Neurological: She is alert and oriented to person, place, and time.   Skin: Skin is warm and dry.   Psychiatric: She has a normal mood and affect.   Vitals reviewed.        Diagnoses and health risks identified today and associated recommendations/orders:    1. Encounter for preventive health examination  Here for Health Risk Assessment/Annual Wellness Visit.  Health maintenance reviewed and updated. Follow up in one year.  Prescription given for Shingrix, TDAP.    2. Essential hypertension  Chronic, stable on current medications. Followed by PCP.    3. Aortic atherosclerosis  Chronic, stable on current medications. Noted CT abdomen/pelvis 7/25/15. Followed by PCP.    4. Carotid artery disorder  Chronic, stable on current medications. 20-39% bilaterally noted on U/S 8/06/15. Followed by PCP.    5. Dyslipidemia  Chronic, stable on current medication. Followed by PCP.    6. Anxiety disorder due to known physiological condition  Chronic, stable on current medications. " Followed by PCP.    7. Chronic tension-type headache, not intractable  Chronic, stable. Followed by PCP.    8. Acquired hypothyroidism  Chronic, stable on current medication. Followed by PCP.    9. Impaired fasting glucose  Chronic, stable with diet. Last A1c 5.8.  Followed by PCP.    10. Osteopenia, unspecified location  Chronic, stable. Followed by PCP.      Provided Elizabeth with a 5-10 year written screening schedule and personal prevention plan. Recommendations were developed using the USPSTF age appropriate recommendations. Education, counseling, and referrals were provided as needed. After Visit Summary printed and given to patient which includes a list of additional screenings\tests needed.    No follow-ups on file.    Negra Jasso NP

## 2020-01-10 NOTE — PATIENT INSTRUCTIONS
Counseling and Referral of Other Preventative  (Italic type indicates deductible and co-insurance are waived)    Patient Name: Elizabeth Gleason  Today's Date: 1/10/2020    Health Maintenance       Date Due Completion Date    TETANUS VACCINE 09/12/1963 Consider obtaining    Shingles Vaccine (1 of 2) 09/12/1996 Obtain new shingles vaccine - SHINGRIX - when available    DEXA SCAN 10/26/2020 10/26/2017    Mammogram 12/16/2020 12/16/2019    Aspirin/Antiplatelet Therapy 01/08/2021 1/8/2020    Lipid Panel 01/08/2021 1/8/2020    Colonoscopy 08/25/2027 8/25/2017        No orders of the defined types were placed in this encounter.    The following information is provided to all patients.  This information is to help you find resources for any of the problems found today that may be affecting your health:                Living healthy guide: www.[facility].[place].gov      Understanding Diabetes: www.diabetes.org      Eating healthy: www.cdc.gov/healthyweight      CDC home safety checklist: www.cdc.gov/steadi/patient.html      Agency on Aging: www.goea.[place].[facility]      Alcoholics anonymous (AA): www.aa.org      Physical Activity: www.nicolás.nih.gov/zq2uozb      Tobacco use: www.quitwithusla.org

## 2020-01-10 NOTE — PROGRESS NOTES
History of present illness:  [age] year lady with hypertension, dyslipidemia.  Fasting blood sugar six-month follow-up.  The patient reports taking all medications as directed though she did change her pitavastatin to Monday Wednesday and Friday due to some muscle aches and pains.  She reports taking blood pressure readings at home and they are generally normal.  Currently without chest pain palpitations syncope cough shortness of breath claudication.    Current medications:  Medications noted reviewed and updated in the electronic medical record medication list.    Review of systems:  Cardiovascular:  No chest pain no palpitations no syncope.  No claudication.  No edema.  Respiratory:  No cough shortness of breath.  Neurologic:  No headaches no focal neurological symptomatologies.    Past medical history, past surgical history, family medical history social history is are all noted and reviewed the electronic medical record history sections.    Physical examination:  General:  Pleasant alert appropriately groomed lady no acute distress.  Vital signs:  Blood pressure taken manually by this examiner is 170/76.  Cardiovascular:  Regular rate rhythm no significant murmur.    Data:  Recent lab data noted reviewed regarding lipids chemistry profile, glycohemoglobin.      Impression:  Hypertension, elevated systolic reading in the office today though she reports home readings are normal.  Dyslipidemia with statin intolerance on low-dose pitavastatin.  Hypothyroidism on replacement therapy.  Prediabetes stable.      Plan:  Advised to start monitoring home blood pressure readings more regularly at home and keep log.  Return to clinic in 2-3 months with her blood pressure log and her home monitor for accuracy validation and follow-up.  Refilled several medication.

## 2020-01-23 ENCOUNTER — TELEPHONE (OUTPATIENT)
Dept: GASTROENTEROLOGY | Facility: CLINIC | Age: 75
End: 2020-01-23

## 2020-01-23 NOTE — TELEPHONE ENCOUNTER
----- Message from Sarah Del Rio sent at 1/23/2020 12:32 PM CST -----  Contact: pt #[phone]  Refill:    diphenoxylate-atropine 2.5-0.025 mg (LOMOTIL) 2.5-0.025 mg per tablet    3 month supply     [facility] [address]  [address] [address]  Phone: [phone] Fax: [phone]

## 2020-02-17 RX ORDER — DIPHENOXYLATE HYDROCHLORIDE AND ATROPINE SULFATE 2.5; .025 MG/1; MG/1
1 TABLET ORAL 4 TIMES DAILY PRN
Qty: 120 TABLET | Refills: 3 | Status: SHIPPED | OUTPATIENT
Start: 2020-02-17 | End: 2020-02-17 | Stop reason: SDUPTHER

## 2020-02-17 NOTE — TELEPHONE ENCOUNTER
----- Message from Anabel Manuel sent at 2/17/2020 12:07 PM CST -----  Contact: Pt [phone]  Pt needs refill send to     [facility]     diphenoxylate-atropine 2.5-0.025 mg (LOMOTIL    Please call back she would like to speak with nurse

## 2020-02-18 ENCOUNTER — TELEPHONE (OUTPATIENT)
Dept: GASTROENTEROLOGY | Facility: CLINIC | Age: 75
End: 2020-02-18

## 2020-02-18 RX ORDER — DIPHENOXYLATE HYDROCHLORIDE AND ATROPINE SULFATE 2.5; .025 MG/1; MG/1
1 TABLET ORAL 4 TIMES DAILY PRN
Qty: 120 TABLET | Refills: 1 | Status: SHIPPED | OUTPATIENT
Start: 2020-02-18 | End: 2020-02-21 | Stop reason: SDUPTHER

## 2020-02-18 NOTE — TELEPHONE ENCOUNTER
----- Message from Christopher Yan MD sent at 2/18/2020  7:43 AM CST -----  Which pharmacy does she want the Lomotil to be sent?

## 2020-02-20 ENCOUNTER — TELEPHONE (OUTPATIENT)
Dept: GASTROENTEROLOGY | Facility: CLINIC | Age: 75
End: 2020-02-20

## 2020-02-20 NOTE — TELEPHONE ENCOUNTER
----- Message from Edith Baeza sent at 2/20/2020 12:32 PM CST -----  Contact: [phone]  Calling for refill on Rx Lamotil, patient wanted a three month supply instead of one. Please call

## 2020-02-20 NOTE — TELEPHONE ENCOUNTER
Dr. Yan,  Patient states she wanted a 90 day supply and 120 tablets 4 times a day as needed  with 1 refill was faxed over to [facility].  Can you write out another script ?  Please advise

## 2020-02-21 RX ORDER — DIPHENOXYLATE HYDROCHLORIDE AND ATROPINE SULFATE 2.5; .025 MG/1; MG/1
1 TABLET ORAL 4 TIMES DAILY PRN
Qty: 360 TABLET | Refills: 0 | Status: SHIPPED | OUTPATIENT
Start: 2020-02-21 | End: 2020-03-23 | Stop reason: SDUPTHER

## 2020-03-24 RX ORDER — DIPHENOXYLATE HYDROCHLORIDE AND ATROPINE SULFATE 2.5; .025 MG/1; MG/1
1 TABLET ORAL 4 TIMES DAILY PRN
Qty: 360 TABLET | Refills: 0 | Status: SHIPPED | OUTPATIENT
Start: 2020-03-24 | End: 2020-06-22

## 2020-03-24 RX ORDER — DIPHENOXYLATE HYDROCHLORIDE AND ATROPINE SULFATE 2.5; .025 MG/1; MG/1
1 TABLET ORAL 4 TIMES DAILY PRN
Qty: 360 TABLET | Refills: 0 | Status: SHIPPED | OUTPATIENT
Start: 2020-03-24 | End: 2020-03-24 | Stop reason: SDUPTHER

## 2020-03-24 RX ORDER — PITAVASTATIN CALCIUM 1.04 MG/1
1 TABLET, FILM COATED ORAL EVERY OTHER DAY
Qty: 45 TABLET | Refills: 3 | Status: SHIPPED | OUTPATIENT
Start: 2020-03-24 | End: 2020-09-22

## 2020-03-24 RX ORDER — LEVOTHYROXINE SODIUM 88 UG/1
88 TABLET ORAL DAILY
Qty: 90 TABLET | Refills: 3 | Status: SHIPPED | OUTPATIENT
Start: 2020-03-24 | End: 2021-04-21 | Stop reason: SDUPTHER

## 2020-03-24 NOTE — TELEPHONE ENCOUNTER
----- Message from Rosio Zavala sent at 3/24/2020 10:41 AM CDT -----  Contact: self/[phone]  Requesting an RX refill or new RX.  Is this a refill or new RX: New rx   RX name and strength: levothyroxine (SYNTHROID) 88 MCG tablet  Directions (copy/paste from chart):    Is this a 30 day or 90 day RX:    Local pharmacy or mail order pharmacy:  Local pharmacy  Pharmacy name and phone # (copy/paste from chart): [facility] - [address] [facility] [phone] (Phone)  [phone] (Fax)    Comments:

## 2020-03-24 NOTE — TELEPHONE ENCOUNTER
----- Message from Denice Posada sent at 3/24/2020  8:35 AM CDT -----  Pt calling to speak w/nurse re: refill on diphenoxylate-atropine 2.5-0.025 mg (LOMOTIL) 2.5-0.025 mg per tablet    Pt states she has information to give the nurse regarding this refill    Pt contact [phone]

## 2020-03-24 NOTE — TELEPHONE ENCOUNTER
----- Message from Cici Santoshkaren sent at 3/24/2020 10:37 AM CDT -----  Contact: Self   Requesting an RX refill or new RX.  Is this a refill or new RX:    RX name and strength: pitavastatin calcium (LIVALO) 1 mg Tab tablet  Directions (copy/paste from chart):  Take 1 tablet (1 mg total) by mouth every other day. - Oral  Is this a 30 day or 90 day RX:  30  Local pharmacy or mail order pharmacy:  mail  Pharmacy name and phone # (copy/paste from chart):   [facility] - [address] [facility] [phone] (Phone)  [phone] (Fax)      Comments:

## 2020-03-24 NOTE — TELEPHONE ENCOUNTER
Dr. Yan,  Pt is requesting a refill on the lomotil and and [facility] does not take e scribe  It has to be fax   Please advise once script is ready to be faxed

## 2020-04-21 NOTE — TELEPHONE ENCOUNTER
----- Message from Floresita Townsend sent at 4/21/2020 12:52 PM CDT -----  Contact: self/[phone]  Pt called in regards to getting a Rx refill for     blood sugar diagnostic Strp 50 strip 11 5/29/2018  No 1 freestyle lite test strip weekly    [facility] [phone]     Please advise

## 2020-04-27 NOTE — TELEPHONE ENCOUNTER
----- Message from Sunni Corbett sent at 2020 10:06 AM CDT -----  Contact: self/ [phone]  Requesting an RX refill or new RX.  Is this a refill or new RX:  refill  RX name and strength:blood sugar diagnostic Strp   Directions (copy/paste from chart):  Si freestyle lite test strip weekly  Is this a 30 day or 90 day RX:  50 strips  Local pharmacy or mail order pharmacy:  Mail order  Pharmacy name and phone # (copy/paste from chart): [facility] - [address] [facility] [phone] (Phone)  [phone] (Fax)    Comments:    Pt states she only received the needles, that she doesn't need she only needs the strips. Please call and advise.

## 2020-07-15 DIAGNOSIS — Z71.89 COMPLEX CARE COORDINATION: ICD-10-CM

## 2020-07-30 ENCOUNTER — TELEPHONE (OUTPATIENT)
Dept: INTERNAL MEDICINE | Facility: CLINIC | Age: 75
End: 2020-07-30

## 2020-07-30 DIAGNOSIS — E78.5 DYSLIPIDEMIA: ICD-10-CM

## 2020-07-30 DIAGNOSIS — R79.9 ABNORMAL FINDING OF BLOOD CHEMISTRY, UNSPECIFIED: ICD-10-CM

## 2020-07-30 DIAGNOSIS — E03.9 HYPOTHYROIDISM, UNSPECIFIED TYPE: ICD-10-CM

## 2020-07-30 DIAGNOSIS — I10 ESSENTIAL HYPERTENSION: Primary | ICD-10-CM

## 2020-07-30 RX ORDER — ESCITALOPRAM OXALATE 10 MG/1
10 TABLET ORAL DAILY
Qty: 90 TABLET | Refills: 2 | Status: SHIPPED | OUTPATIENT
Start: 2020-07-30 | End: 2021-04-21 | Stop reason: SDUPTHER

## 2020-07-30 NOTE — TELEPHONE ENCOUNTER
Wanted lexapro.  Told her we got request earlier today and dr will be doing today.    We set up visit for sept and labs prior.

## 2020-07-30 NOTE — TELEPHONE ENCOUNTER
----- Message from Shawna Anand sent at 7/30/2020  3:31 PM CDT -----  Type:  Needs Medical Advice    Who Called: CHRISTEN     Would the patient rather a call back or a response via [facility]? CALL BACK     Best Call Back Number: [phone]    Additional Information: CHRISTEN WOULD LIKE TO SPEAK WITH THE NURSE TODAY ABOUT HER MEDICATION

## 2020-07-30 NOTE — TELEPHONE ENCOUNTER
----- Message from Zhen Coronado sent at 7/30/2020 10:35 AM CDT -----  Regarding: Rx refill  Contact: Patient [phone]  RX request - refill or new RX.  Is this a refill or new RX:  Refill   RX name and strength:  escitalopram oxalate (LEXAPRO) 10 MG tablet     Pharmacy name and phone # [facility] [place] [id number] [facility] [phone] (Phone)  [phone] (Fax      Comments:  requesting for the office to call out to patient prior to sending request, regarding an appt as well, worried about coming int to office due to Covid 19  Patient [phone]      Please call an advise  Thank you

## 2020-07-31 RX ORDER — DIAZEPAM 5 MG/1
TABLET ORAL
Qty: 20 TABLET | Refills: 0 | Status: SHIPPED | OUTPATIENT
Start: 2020-07-31 | End: 2021-04-21 | Stop reason: SDUPTHER

## 2020-07-31 NOTE — TELEPHONE ENCOUNTER
----- Message from Denise Nayak sent at 7/31/2020 11:40 AM CDT -----  Contact: Self [phone]  Requesting an RX refill or new RX.  Is this a refill or new RX:  refill  RX name and strength: diazePAM (VALIUM) 5 MG tablet  Directions (copy/paste from chart):    Is this a 30 day or 90 day RX:  30 day   Local pharmacy or mail order pharmacy:  local  Pharmacy name and phone # (copy/paste from chart):   [facility]  [address] [facility] [phone] (Phone)  [phone] (Fax)  Comments:

## 2020-07-31 NOTE — TELEPHONE ENCOUNTER
----- Message from Danica Helms sent at 7/31/2020  4:00 PM CDT -----  Regarding: Rx  Contact: Patient @ [phone]  Requesting an RX refill or new RX.  Is this a refill or new RX:  Refill  RX name and strength: diazePAM (VALIUM) 5 MG tablet  Directions (copy/paste from chart):    Is this a 30 day or 90 day RX:    Local pharmacy or mail order pharmacy:  Mail  Pharmacy name and phone # [facility] - [address]  Comments:

## 2020-09-08 ENCOUNTER — OFFICE VISIT (OUTPATIENT)
Dept: INTERNAL MEDICINE | Facility: CLINIC | Age: 75
End: 2020-09-08
Payer: MEDICARE

## 2020-09-08 VITALS
RESPIRATION RATE: 14 BRPM | OXYGEN SATURATION: 95 % | HEART RATE: 65 BPM | BODY MASS INDEX: 25.83 KG/M2 | SYSTOLIC BLOOD PRESSURE: 140 MMHG | TEMPERATURE: 98 F | DIASTOLIC BLOOD PRESSURE: 64 MMHG | WEIGHT: 155 LBS | HEIGHT: 65 IN

## 2020-09-08 DIAGNOSIS — E03.9 ACQUIRED HYPOTHYROIDISM: ICD-10-CM

## 2020-09-08 DIAGNOSIS — I10 ESSENTIAL HYPERTENSION: Primary | ICD-10-CM

## 2020-09-08 DIAGNOSIS — R73.01 IMPAIRED FASTING GLUCOSE: ICD-10-CM

## 2020-09-08 DIAGNOSIS — E78.5 DYSLIPIDEMIA: ICD-10-CM

## 2020-09-08 PROCEDURE — 99214 PR OFFICE/OUTPT VISIT, EST, LEVL IV, 30-39 MIN: ICD-10-PCS | Mod: S$PBB,,, | Performed by: INTERNAL MEDICINE

## 2020-09-08 PROCEDURE — 99214 OFFICE O/P EST MOD 30 MIN: CPT | Mod: S$PBB,,, | Performed by: INTERNAL MEDICINE

## 2020-09-08 PROCEDURE — 99999 PR PBB SHADOW E&M-EST. PATIENT-LVL IV: ICD-10-PCS | Mod: PBBFAC,,, | Performed by: INTERNAL MEDICINE

## 2020-09-08 PROCEDURE — 99214 OFFICE O/P EST MOD 30 MIN: CPT | Mod: PBBFAC,PO | Performed by: INTERNAL MEDICINE

## 2020-09-08 PROCEDURE — 99999 PR PBB SHADOW E&M-EST. PATIENT-LVL IV: CPT | Mod: PBBFAC,,, | Performed by: INTERNAL MEDICINE

## 2020-09-08 RX ORDER — LOSARTAN POTASSIUM AND HYDROCHLOROTHIAZIDE 12.5; 1 MG/1; MG/1
1 TABLET ORAL DAILY
Qty: 90 TABLET | Refills: 3 | Status: SHIPPED | OUTPATIENT
Start: 2020-09-08 | End: 2021-04-21 | Stop reason: SDUPTHER

## 2020-09-08 NOTE — PROGRESS NOTES
History of present illness:  [age] year lady with hypertension, dyslipidemia, hypothyroidism and prediabetes six-month follow-up.  The patient reports that all is doing well generally.  Taking medications as directed.  Denies chest pain palpitations cough shortness of breath.    Current medications:  All medications are noted and reviewed in the electronic medical record medication list.    Review of systems:  Constitutional:  No fever no chills no generalized body aches.  Respiratory:  No cough shortness of breath.  Cardiovascular:  No chest pain no palpitations no syncope no presyncope no claudication.  GI:  No nausea no vomiting abdominal pain or diarrhea.  Neurologic:  No headaches or focal neurological symptomatologies.    Past medical history, past surgical history, family medical history and social history is are noted and reviewed in the electronic medical record history sections.    Physical examination:  General:  Pleasant alert appropriately groomed lady no acute distress.  Vital signs:  Blood pressure taken manually by this examiner is 150/70.  HEENT:  Normocephalic.  Neck supple no masses no thyromegaly.  Lungs:  Clear to auscultation.  Cardiovascular:  Regular rate and rhythm.  No significant murmur.  Carotids full bilaterally bruits.  No significant peripheral extremity edema and no ischemic changes of lower extremities.  Mental status:  Alert oriented affect mood all appropriate.    Data:  Recent health maintenance lab data noted and reviewed.    Impression:  Hypertension is not optimally controlled.  Dyslipidemia with moderate statin intolerance tolerating low-dose pitavastatin.  Hypothyroidism on replacement therapy.  Prediabetes stable.    Plan:  Increase the losartan hydrochlorothiazide preparation to 100-25 mg daily.  Discussed monitoring blood pressure readings and to advise if not consistently within normal parameters.  Return clinic in 6 months.

## 2020-09-22 ENCOUNTER — OFFICE VISIT (OUTPATIENT)
Dept: OBSTETRICS AND GYNECOLOGY | Facility: CLINIC | Age: 75
End: 2020-09-22
Payer: MEDICARE

## 2020-09-22 VITALS
SYSTOLIC BLOOD PRESSURE: 148 MMHG | HEART RATE: 69 BPM | HEIGHT: 65 IN | BODY MASS INDEX: 24.49 KG/M2 | WEIGHT: 147 LBS | DIASTOLIC BLOOD PRESSURE: 62 MMHG

## 2020-09-22 DIAGNOSIS — Z01.419 ENCOUNTER FOR GYNECOLOGICAL EXAMINATION WITHOUT ABNORMAL FINDING: Primary | ICD-10-CM

## 2020-09-22 DIAGNOSIS — Z13.820 OSTEOPOROSIS SCREENING: ICD-10-CM

## 2020-09-22 DIAGNOSIS — Z12.39 BREAST CANCER SCREENING: ICD-10-CM

## 2020-09-22 DIAGNOSIS — Z78.0 POSTMENOPAUSAL STATE: ICD-10-CM

## 2020-09-22 DIAGNOSIS — M85.80 OSTEOPENIA, UNSPECIFIED LOCATION: ICD-10-CM

## 2020-09-22 PROCEDURE — 99999 PR PBB SHADOW E&M-EST. PATIENT-LVL IV: CPT | Mod: PBBFAC,,, | Performed by: OBSTETRICS & GYNECOLOGY

## 2020-09-22 PROCEDURE — G0101 PR CA SCREEN;PELVIC/BREAST EXAM: ICD-10-PCS | Mod: S$PBB,,, | Performed by: OBSTETRICS & GYNECOLOGY

## 2020-09-22 PROCEDURE — 99999 PR PBB SHADOW E&M-EST. PATIENT-LVL IV: ICD-10-PCS | Mod: PBBFAC,,, | Performed by: OBSTETRICS & GYNECOLOGY

## 2020-09-22 PROCEDURE — G0101 CA SCREEN;PELVIC/BREAST EXAM: HCPCS | Mod: S$PBB,,, | Performed by: OBSTETRICS & GYNECOLOGY

## 2020-09-22 PROCEDURE — 99214 OFFICE O/P EST MOD 30 MIN: CPT | Mod: PBBFAC,PO | Performed by: OBSTETRICS & GYNECOLOGY

## 2020-09-22 RX ORDER — PHENAZOPYRIDINE HYDROCHLORIDE 200 MG/1
200 TABLET, FILM COATED ORAL
Qty: 10 TABLET | Refills: 0 | Status: SHIPPED | OUTPATIENT
Start: 2020-09-22 | End: 2020-09-25

## 2020-09-22 NOTE — PROGRESS NOTES
Subjective:       Patient ID: Elizabeth Gleason is a 75 y.o. female.    Chief Complaint:  Well Woman      History of Present Illness  Patient presents for annual exam.  Patient's last mammogram was in 2019 was within normal limits.  Patient's last DEXA bone scan was in  and showed osteopenia.  Patient will reschedule both in December of this year.  She is otherwise without gyn complaints.    Menstrual History:  OB History        1    Para   1    Term   1            AB        Living   1       SAB        TAB        Ectopic        Multiple        Live Births   1                Menarche age:  No LMP recorded. Patient is postmenopausal.         Review of Systems  Review of Systems   Constitutional: Negative for activity change, appetite change, chills, diaphoresis, fatigue, fever and unexpected weight change.   HENT: Positive for hearing loss, rhinorrhea and sinus pressure. Negative for congestion, dental problem, drooling, ear discharge, ear pain, facial swelling, mouth sores, nosebleeds, postnasal drip, sneezing, sore throat, tinnitus, trouble swallowing and voice change.    Eyes: Negative for photophobia, pain, discharge, redness, itching and visual disturbance.   Respiratory: Negative for apnea, cough, choking, chest tightness, shortness of breath, wheezing and stridor.    Cardiovascular: Negative for chest pain, palpitations and leg swelling.   Gastrointestinal: Negative for abdominal distention, abdominal pain, anal bleeding, blood in stool, constipation, diarrhea, nausea, rectal pain and vomiting.   Endocrine: Negative for cold intolerance, heat intolerance, polydipsia, polyphagia and polyuria.   Genitourinary: Positive for enuresis. Negative for decreased urine volume, difficulty urinating, dyspareunia, dysuria, flank pain, frequency, genital sores, hematuria, menstrual problem, pelvic pain, urgency, vaginal bleeding, vaginal discharge and vaginal pain.   Musculoskeletal: Negative  for arthralgias, back pain, gait problem, joint swelling, myalgias, neck pain and neck stiffness.   Skin: Negative for color change, pallor, rash and wound.   Allergic/Immunologic: Negative for environmental allergies, food allergies and immunocompromised state.   Neurological: Positive for light-headedness. Negative for dizziness, tremors, seizures, syncope, facial asymmetry, speech difficulty, weakness, numbness and headaches.   Hematological: Negative for adenopathy. Does not bruise/bleed easily.   Psychiatric/Behavioral: Negative for agitation, behavioral problems, confusion, decreased concentration, dysphoric mood, hallucinations, self-injury, sleep disturbance and suicidal ideas. The patient is nervous/anxious and is hyperactive.            Objective:      Physical Exam  Vitals signs and nursing note reviewed.   Constitutional:       Appearance: She is well-developed.   Neck:      Thyroid: No thyromegaly.   Cardiovascular:      Rate and Rhythm: Normal rate and regular rhythm.   Pulmonary:      Effort: Pulmonary effort is normal.      Breath sounds: Normal breath sounds.   Chest:      Breasts: Breasts are symmetrical.         Right: No inverted nipple, mass, nipple discharge, skin change or tenderness.         Left: No inverted nipple, mass, nipple discharge, skin change or tenderness.   Abdominal:      General: Bowel sounds are normal.      Palpations: Abdomen is soft. There is no mass.      Tenderness: There is no abdominal tenderness.      Hernia: There is no hernia in the left inguinal area.   Genitourinary:     Vagina: Normal. No foreign body. No vaginal discharge or tenderness.      Cervix: No cervical motion tenderness, discharge or friability.      Uterus: Not enlarged and not tender.       Adnexa:         Right: No mass, tenderness or fullness.          Left: No mass, tenderness or fullness.        Rectum: No external hemorrhoid.   Musculoskeletal: Normal range of motion.   Skin:     General: Skin is  dry.   Neurological:      Mental Status: She is alert and oriented to person, place, and time.      Deep Tendon Reflexes: Reflexes are normal and symmetric.   Psychiatric:         Behavior: Behavior normal.         Thought Content: Thought content normal.         Judgment: Judgment normal.             Assessment:        1. Encounter for gynecological examination without abnormal finding    2. Postmenopausal state    3. Osteopenia, unspecified location    4. Breast cancer screening    5. Osteoporosis screening                Plan:         Elizabeth was seen today for well woman.    Diagnoses and all orders for this visit:    Encounter for gynecological examination without abnormal finding    Postmenopausal state  -     DXA Bone Density Spine And Hip; Future  -     DXA Bone Density Spine And Hip    Osteopenia, unspecified location    Breast cancer screening  -     Mammo Digital Screening Bilat w/ Edwin; Future    Osteoporosis screening  -     DXA Bone Density Spine And Hip; Future  -     DXA Bone Density Spine And Hip

## 2020-10-20 ENCOUNTER — TELEPHONE (OUTPATIENT)
Dept: GASTROENTEROLOGY | Facility: CLINIC | Age: 75
End: 2020-10-20

## 2020-10-20 RX ORDER — DIPHENOXYLATE HYDROCHLORIDE AND ATROPINE SULFATE 2.5; .025 MG/1; MG/1
1 TABLET ORAL 4 TIMES DAILY PRN
Qty: 120 TABLET | Refills: 3 | Status: SHIPPED | OUTPATIENT
Start: 2020-10-20 | End: 2021-04-20 | Stop reason: SDUPTHER

## 2020-10-20 NOTE — TELEPHONE ENCOUNTER
----- Message from Elizabeth Rosario sent at 10/20/2020 12:02 PM CDT -----  Elizabeth Gleason calling regarding Refills  (message) for #diphenoxylate-atropine 2.5-0.025 mg (LOMOTIL) 2.5-0.025 mg per tablet. Call back [phone]      [facility] [address]  [address] [address]  Phone: [phone] Fax: [phone]

## 2020-10-20 NOTE — TELEPHONE ENCOUNTER
----- Message from Christopher Yan MD sent at 10/20/2020  4:06 PM CDT -----  Please schedule a follow up visit with me.

## 2020-10-22 ENCOUNTER — TELEPHONE (OUTPATIENT)
Dept: GASTROENTEROLOGY | Facility: CLINIC | Age: 75
End: 2020-10-22

## 2020-10-22 NOTE — TELEPHONE ENCOUNTER
Ma returned pt call no answer  Message left on pt vm  Pt follow up appt scheduled for 12/22  Letter mailed

## 2020-10-22 NOTE — TELEPHONE ENCOUNTER
----- Message from Elizabeth Rosario sent at 10/22/2020  9:22 AM CDT -----  YESENIA VELÁSQUEZ calling regarding Refills  (message) for #diphenoxylate-atropine 2.5-0.025 mg (LOMOTIL) 2.5-0.025 mg per tablet  [phone]...  pt rec'd info of her appt date and time    [facility] [address]  [address] [address]  Phone: [phone] Fax: [phone]

## 2020-10-22 NOTE — TELEPHONE ENCOUNTER
----- Message from Vianney Melendez MA sent at 10/21/2020  9:36 PM CDT -----    ----- Message -----  From: Elizabeth Rosario  Sent: 10/21/2020   4:54 PM CDT  To: Yuriy SIEGEL Staff    Elizabeth Gleason calling regarding a miss from dr dan nurse. [phone]

## 2020-11-25 ENCOUNTER — TELEPHONE (OUTPATIENT)
Dept: INTERNAL MEDICINE | Facility: CLINIC | Age: 75
End: 2020-11-25

## 2020-11-25 RX ORDER — PITAVASTATIN CALCIUM 1.04 MG/1
1 TABLET, FILM COATED ORAL NIGHTLY
Qty: 90 TABLET | Refills: 3 | Status: SHIPPED | OUTPATIENT
Start: 2020-11-25 | End: 2021-04-21 | Stop reason: SDUPTHER

## 2020-12-18 ENCOUNTER — PATIENT OUTREACH (OUTPATIENT)
Dept: ADMINISTRATIVE | Facility: OTHER | Age: 75
End: 2020-12-18

## 2020-12-18 NOTE — PROGRESS NOTES
LINKS immunization registry updated  Care Everywhere updated  Health Maintenance updated  Chart reviewed for overdue Proactive [facility] ([facility]) health maintenance testing (CRS, Breast Ca, Diabetic Eye Exam)   Orders entered:N/A

## 2020-12-22 ENCOUNTER — OFFICE VISIT (OUTPATIENT)
Dept: GASTROENTEROLOGY | Facility: CLINIC | Age: 75
End: 2020-12-22
Payer: MEDICARE

## 2020-12-22 VITALS
SYSTOLIC BLOOD PRESSURE: 142 MMHG | DIASTOLIC BLOOD PRESSURE: 65 MMHG | BODY MASS INDEX: 25.85 KG/M2 | WEIGHT: 155.19 LBS | HEART RATE: 64 BPM | HEIGHT: 65 IN

## 2020-12-22 DIAGNOSIS — K52.9 CHRONIC DIARRHEA: Primary | ICD-10-CM

## 2020-12-22 PROCEDURE — 99999 PR PBB SHADOW E&M-EST. PATIENT-LVL III: CPT | Mod: PBBFAC,,, | Performed by: INTERNAL MEDICINE

## 2020-12-22 PROCEDURE — 99213 OFFICE O/P EST LOW 20 MIN: CPT | Mod: PBBFAC | Performed by: INTERNAL MEDICINE

## 2020-12-22 PROCEDURE — 99999 PR PBB SHADOW E&M-EST. PATIENT-LVL III: ICD-10-PCS | Mod: PBBFAC,,, | Performed by: INTERNAL MEDICINE

## 2020-12-22 PROCEDURE — 99214 OFFICE O/P EST MOD 30 MIN: CPT | Mod: S$PBB,,, | Performed by: INTERNAL MEDICINE

## 2020-12-22 PROCEDURE — 99214 PR OFFICE/OUTPT VISIT, EST, LEVL IV, 30-39 MIN: ICD-10-PCS | Mod: S$PBB,,, | Performed by: INTERNAL MEDICINE

## 2020-12-22 NOTE — PROGRESS NOTES
REASON FOR VISIT:  Chronic Diarrhea      HISTORY OF PRESENT ILLNESS:  Ms. Gleason is a 75-year-old who has been   complaining of loose bowel movements with urge fecal incontinence, which has not   responded to probiotics or Bentyl.  Last seen in July 2019. In the past, she has undergone stool   testing for C. diff, routine cultures, Giardia.  Negative ova, parasites and no   evidence of white cells.  She has also been tested for pancreatic insufficiency   with stool for fecal elastase and was negative.  Prior colonoscopy for screening   from 2017 was unremarkable.  She had previously seen the surgeon, Dr. Lui, for fecal   urgency and digital rectal exam revealed good tone and defecography was normal   as well.  Previously we had discussed about repeating colonoscopy with biopsies to   evaluate for microscopic colitis, however she did not want to pursue it. Her symptoms are well controlled on Lomotil 1 tablet 3 times a day.  She has also been taking Metamucil 1 capful every other day. She has noted that her stools are more formed and she moves her bowels once or twice a day now. She is tolerating Lomotil well with no side effects.     PAST MEDICAL, SURGICAL, SOCIAL AND FAMILY HISTORY:  Reviewed.     MEDICATIONS AND ALLERGIES:  Reviewed.     REVIEW OF SYSTEMS:  CONSTITUTIONAL:  No fever, no chills, no weight loss.  Appetite is normal.  EYES:  No visual changes.  ENT:  No odynophagia or hoarseness of voice.  CARDIOVASCULAR:  No angina or palpitation.  RESPIRATORY:  No shortness of breath or wheezing.  GASTROINTESTINAL:  See HPI.  No blood in the stool.     PHYSICAL EXAMINATION:  VITAL SIGNS:  See EPIC.  GENERAL:  Awake, alert and oriented x3, no acute distress.  Neck:  Supple, no carotid bruits  Cardiovascular:  S1, S2 normal, no murmurs or gallops  Respiratory:  Bilateral air entry equal no rhonchi or crackles  Abdomen:  Soft, nondistended, nontender, no organomegaly appreciated, bowel sounds are normal, no  abdominal bruits heard  Lower Extremities: No pedal edema        About 25 minutes spent with the patient with more than 50% in counseling         IMPRESSION: Chronic diarrhea well controlled on Lomotil 3 times daily with Metamucil 1 tbsp every other day.     RECOMMENDATION:    1. Continue metamucil and Lomotil for diarrhea.  2. Follow up in 1 year or sooner with any issues.

## 2021-01-07 ENCOUNTER — TELEPHONE (OUTPATIENT)
Dept: OBSTETRICS AND GYNECOLOGY | Facility: CLINIC | Age: 76
End: 2021-01-07

## 2021-03-06 ENCOUNTER — PATIENT OUTREACH (OUTPATIENT)
Dept: ADMINISTRATIVE | Facility: OTHER | Age: 76
End: 2021-03-06

## 2021-03-08 ENCOUNTER — OFFICE VISIT (OUTPATIENT)
Dept: CARDIOLOGY | Facility: CLINIC | Age: 76
End: 2021-03-08
Payer: MEDICARE

## 2021-03-08 VITALS
WEIGHT: 155.44 LBS | HEART RATE: 62 BPM | HEIGHT: 65 IN | SYSTOLIC BLOOD PRESSURE: 168 MMHG | DIASTOLIC BLOOD PRESSURE: 69 MMHG | BODY MASS INDEX: 25.9 KG/M2

## 2021-03-08 DIAGNOSIS — I65.23 BILATERAL CAROTID ARTERY STENOSIS: Chronic | ICD-10-CM

## 2021-03-08 DIAGNOSIS — I10 ESSENTIAL HYPERTENSION: Primary | Chronic | ICD-10-CM

## 2021-03-08 DIAGNOSIS — E78.2 MIXED HYPERLIPIDEMIA: Chronic | ICD-10-CM

## 2021-03-08 DIAGNOSIS — I70.0 AORTIC ATHEROSCLEROSIS: Chronic | ICD-10-CM

## 2021-03-08 DIAGNOSIS — Z78.9 STATIN INTOLERANCE: Chronic | ICD-10-CM

## 2021-03-08 PROCEDURE — 93010 EKG 12-LEAD: ICD-10-PCS | Mod: S$PBB,,, | Performed by: INTERNAL MEDICINE

## 2021-03-08 PROCEDURE — 99204 PR OFFICE/OUTPT VISIT, NEW, LEVL IV, 45-59 MIN: ICD-10-PCS | Mod: S$PBB,25,, | Performed by: INTERNAL MEDICINE

## 2021-03-08 PROCEDURE — 93010 ELECTROCARDIOGRAM REPORT: CPT | Mod: S$PBB,,, | Performed by: INTERNAL MEDICINE

## 2021-03-08 PROCEDURE — 99214 OFFICE O/P EST MOD 30 MIN: CPT | Mod: PBBFAC,PO,25 | Performed by: INTERNAL MEDICINE

## 2021-03-08 PROCEDURE — 93005 ELECTROCARDIOGRAM TRACING: CPT | Mod: PBBFAC,PO | Performed by: INTERNAL MEDICINE

## 2021-03-08 PROCEDURE — 99204 OFFICE O/P NEW MOD 45 MIN: CPT | Mod: S$PBB,25,, | Performed by: INTERNAL MEDICINE

## 2021-03-08 PROCEDURE — 99999 PR PBB SHADOW E&M-EST. PATIENT-LVL IV: CPT | Mod: PBBFAC,,, | Performed by: INTERNAL MEDICINE

## 2021-03-08 PROCEDURE — 99999 PR PBB SHADOW E&M-EST. PATIENT-LVL IV: ICD-10-PCS | Mod: PBBFAC,,, | Performed by: INTERNAL MEDICINE

## 2021-03-08 RX ORDER — AMLODIPINE BESYLATE 2.5 MG/1
2.5 TABLET ORAL NIGHTLY
Qty: 90 TABLET | Refills: 3 | Status: SHIPPED | OUTPATIENT
Start: 2021-03-08 | End: 2021-03-08

## 2021-03-08 RX ORDER — IBUPROFEN 200 MG
200 TABLET ORAL
COMMUNITY

## 2021-03-08 RX ORDER — BEMPEDOIC ACID 180 MG/1
1 TABLET, FILM COATED ORAL
Qty: 30 TABLET | Refills: 11 | Status: SHIPPED | OUTPATIENT
Start: 2021-03-08 | End: 2021-03-09 | Stop reason: SDUPTHER

## 2021-03-08 RX ORDER — BEMPEDOIC ACID 180 MG/1
1 TABLET, FILM COATED ORAL
Qty: 30 TABLET | Refills: 11 | Status: SHIPPED | OUTPATIENT
Start: 2021-03-08 | End: 2021-03-08

## 2021-03-08 RX ORDER — AMLODIPINE BESYLATE 2.5 MG/1
2.5 TABLET ORAL NIGHTLY
Qty: 90 TABLET | Refills: 3 | Status: SHIPPED | OUTPATIENT
Start: 2021-03-08 | End: 2021-04-21 | Stop reason: SDUPTHER

## 2021-03-09 ENCOUNTER — TELEPHONE (OUTPATIENT)
Dept: CARDIOLOGY | Facility: CLINIC | Age: 76
End: 2021-03-09

## 2021-03-09 ENCOUNTER — PES CALL (OUTPATIENT)
Dept: ADMINISTRATIVE | Facility: CLINIC | Age: 76
End: 2021-03-09

## 2021-03-19 ENCOUNTER — TELEPHONE (OUTPATIENT)
Dept: INTERNAL MEDICINE | Facility: CLINIC | Age: 76
End: 2021-03-19

## 2021-03-19 DIAGNOSIS — I10 ESSENTIAL HYPERTENSION: ICD-10-CM

## 2021-03-19 DIAGNOSIS — E78.5 DYSLIPIDEMIA: Primary | ICD-10-CM

## 2021-03-19 DIAGNOSIS — R73.01 IMPAIRED FASTING GLUCOSE: ICD-10-CM

## 2021-03-22 ENCOUNTER — TELEPHONE (OUTPATIENT)
Dept: CARDIOLOGY | Facility: CLINIC | Age: 76
End: 2021-03-22

## 2021-03-22 ENCOUNTER — TELEPHONE (OUTPATIENT)
Dept: INTERNAL MEDICINE | Facility: CLINIC | Age: 76
End: 2021-03-22

## 2021-03-22 DIAGNOSIS — E78.2 MIXED HYPERLIPIDEMIA: ICD-10-CM

## 2021-03-30 ENCOUNTER — OFFICE VISIT (OUTPATIENT)
Dept: URGENT CARE | Facility: CLINIC | Age: 76
End: 2021-03-30
Payer: MEDICARE

## 2021-03-30 VITALS
HEART RATE: 69 BPM | DIASTOLIC BLOOD PRESSURE: 65 MMHG | TEMPERATURE: 98 F | BODY MASS INDEX: 25.83 KG/M2 | HEIGHT: 65 IN | RESPIRATION RATE: 18 BRPM | WEIGHT: 155 LBS | SYSTOLIC BLOOD PRESSURE: 147 MMHG | OXYGEN SATURATION: 97 %

## 2021-03-30 DIAGNOSIS — B02.9 HERPES ZOSTER WITHOUT COMPLICATION: Primary | ICD-10-CM

## 2021-03-30 PROCEDURE — 99214 OFFICE O/P EST MOD 30 MIN: CPT | Mod: S$GLB,,, | Performed by: PHYSICIAN ASSISTANT

## 2021-03-30 PROCEDURE — 99214 PR OFFICE/OUTPT VISIT, EST, LEVL IV, 30-39 MIN: ICD-10-PCS | Mod: S$GLB,,, | Performed by: PHYSICIAN ASSISTANT

## 2021-03-30 RX ORDER — PHENAZOPYRIDINE HYDROCHLORIDE 200 MG/1
200 TABLET, FILM COATED ORAL 3 TIMES DAILY PRN
COMMUNITY
End: 2021-10-12 | Stop reason: SDUPTHER

## 2021-03-30 RX ORDER — VALACYCLOVIR HYDROCHLORIDE 1 G/1
1000 TABLET, FILM COATED ORAL 3 TIMES DAILY
Qty: 21 TABLET | Refills: 1 | Status: SHIPPED | OUTPATIENT
Start: 2021-03-30 | End: 2021-12-03

## 2021-04-13 ENCOUNTER — TELEPHONE (OUTPATIENT)
Dept: FAMILY MEDICINE | Facility: CLINIC | Age: 76
End: 2021-04-13

## 2021-04-20 RX ORDER — DIPHENOXYLATE HYDROCHLORIDE AND ATROPINE SULFATE 2.5; .025 MG/1; MG/1
1 TABLET ORAL 4 TIMES DAILY PRN
Qty: 120 TABLET | Refills: 3 | Status: SHIPPED | OUTPATIENT
Start: 2021-04-20 | End: 2021-10-04 | Stop reason: SDUPTHER

## 2021-04-21 ENCOUNTER — PES CALL (OUTPATIENT)
Dept: ADMINISTRATIVE | Facility: CLINIC | Age: 76
End: 2021-04-21

## 2021-04-21 ENCOUNTER — OFFICE VISIT (OUTPATIENT)
Dept: FAMILY MEDICINE | Facility: CLINIC | Age: 76
End: 2021-04-21
Payer: MEDICARE

## 2021-04-21 VITALS
RESPIRATION RATE: 18 BRPM | SYSTOLIC BLOOD PRESSURE: 136 MMHG | HEIGHT: 65 IN | OXYGEN SATURATION: 98 % | WEIGHT: 155 LBS | HEART RATE: 69 BPM | DIASTOLIC BLOOD PRESSURE: 80 MMHG | BODY MASS INDEX: 25.83 KG/M2 | TEMPERATURE: 98 F

## 2021-04-21 DIAGNOSIS — E78.2 MIXED HYPERLIPIDEMIA: Primary | ICD-10-CM

## 2021-04-21 DIAGNOSIS — F41.9 ANXIETY: ICD-10-CM

## 2021-04-21 DIAGNOSIS — I10 ESSENTIAL HYPERTENSION: Chronic | ICD-10-CM

## 2021-04-21 DIAGNOSIS — E03.9 HYPOTHYROIDISM, UNSPECIFIED TYPE: ICD-10-CM

## 2021-04-21 PROCEDURE — 99999 PR PBB SHADOW E&M-EST. PATIENT-LVL V: CPT | Mod: PBBFAC,,, | Performed by: FAMILY MEDICINE

## 2021-04-21 PROCEDURE — 99214 OFFICE O/P EST MOD 30 MIN: CPT | Mod: S$PBB,,, | Performed by: FAMILY MEDICINE

## 2021-04-21 PROCEDURE — 99214 PR OFFICE/OUTPT VISIT, EST, LEVL IV, 30-39 MIN: ICD-10-PCS | Mod: S$PBB,,, | Performed by: FAMILY MEDICINE

## 2021-04-21 PROCEDURE — 99999 PR PBB SHADOW E&M-EST. PATIENT-LVL V: ICD-10-PCS | Mod: PBBFAC,,, | Performed by: FAMILY MEDICINE

## 2021-04-21 PROCEDURE — 99215 OFFICE O/P EST HI 40 MIN: CPT | Mod: PBBFAC,PN | Performed by: FAMILY MEDICINE

## 2021-04-21 RX ORDER — AMLODIPINE BESYLATE 2.5 MG/1
2.5 TABLET ORAL NIGHTLY
Qty: 90 TABLET | Refills: 3 | Status: SHIPPED | OUTPATIENT
Start: 2021-04-21 | End: 2022-04-13 | Stop reason: SDUPTHER

## 2021-04-21 RX ORDER — LOSARTAN POTASSIUM AND HYDROCHLOROTHIAZIDE 12.5; 1 MG/1; MG/1
1 TABLET ORAL DAILY
Qty: 90 TABLET | Refills: 3 | Status: SHIPPED | OUTPATIENT
Start: 2021-04-21 | End: 2022-04-13 | Stop reason: SDUPTHER

## 2021-04-21 RX ORDER — LEVOTHYROXINE SODIUM 88 UG/1
88 TABLET ORAL DAILY
Qty: 90 TABLET | Refills: 3 | Status: SHIPPED | OUTPATIENT
Start: 2021-04-21 | End: 2022-04-27 | Stop reason: SDUPTHER

## 2021-04-21 RX ORDER — ESCITALOPRAM OXALATE 10 MG/1
10 TABLET ORAL DAILY
Qty: 90 TABLET | Refills: 2 | Status: SHIPPED | OUTPATIENT
Start: 2021-04-21 | End: 2021-04-21

## 2021-04-21 RX ORDER — DIAZEPAM 5 MG/1
TABLET ORAL
Qty: 20 TABLET | Refills: 0 | Status: SHIPPED | OUTPATIENT
Start: 2021-04-21 | End: 2022-03-08 | Stop reason: SDUPTHER

## 2021-04-21 RX ORDER — PITAVASTATIN CALCIUM 1.04 MG/1
1 TABLET, FILM COATED ORAL NIGHTLY
Qty: 90 TABLET | Refills: 3 | Status: SHIPPED | OUTPATIENT
Start: 2021-04-21 | End: 2022-04-13 | Stop reason: SDUPTHER

## 2021-04-21 RX ORDER — ESCITALOPRAM OXALATE 20 MG/1
20 TABLET ORAL DAILY
Qty: 90 TABLET | Refills: 3 | Status: SHIPPED | OUTPATIENT
Start: 2021-04-21 | End: 2022-04-13 | Stop reason: SDUPTHER

## 2021-04-21 RX ORDER — DIAZEPAM 5 MG/1
TABLET ORAL
Qty: 20 TABLET | Refills: 0 | Status: SHIPPED | OUTPATIENT
Start: 2021-04-21 | End: 2021-04-21 | Stop reason: SDUPTHER

## 2021-04-22 ENCOUNTER — TELEPHONE (OUTPATIENT)
Dept: CARDIOLOGY | Facility: CLINIC | Age: 76
End: 2021-04-22

## 2021-04-26 ENCOUNTER — TELEPHONE (OUTPATIENT)
Dept: FAMILY MEDICINE | Facility: CLINIC | Age: 76
End: 2021-04-26

## 2021-04-28 ENCOUNTER — TELEPHONE (OUTPATIENT)
Dept: FAMILY MEDICINE | Facility: CLINIC | Age: 76
End: 2021-04-28

## 2021-05-24 ENCOUNTER — PES CALL (OUTPATIENT)
Dept: ADMINISTRATIVE | Facility: CLINIC | Age: 76
End: 2021-05-24

## 2021-07-12 ENCOUNTER — PES CALL (OUTPATIENT)
Dept: ADMINISTRATIVE | Facility: CLINIC | Age: 76
End: 2021-07-12

## 2021-08-12 ENCOUNTER — PES CALL (OUTPATIENT)
Dept: ADMINISTRATIVE | Facility: CLINIC | Age: 76
End: 2021-08-12

## 2021-09-17 ENCOUNTER — PES CALL (OUTPATIENT)
Dept: ADMINISTRATIVE | Facility: CLINIC | Age: 76
End: 2021-09-17

## 2021-09-24 ENCOUNTER — TELEPHONE (OUTPATIENT)
Dept: FAMILY MEDICINE | Facility: CLINIC | Age: 76
End: 2021-09-24

## 2021-09-28 ENCOUNTER — TELEPHONE (OUTPATIENT)
Dept: FAMILY MEDICINE | Facility: CLINIC | Age: 76
End: 2021-09-28

## 2021-09-28 DIAGNOSIS — E11.9 DIABETES MELLITUS WITHOUT COMPLICATION: ICD-10-CM

## 2021-09-28 DIAGNOSIS — F41.9 ANXIETY: ICD-10-CM

## 2021-09-28 DIAGNOSIS — E03.9 HYPOTHYROIDISM, UNSPECIFIED TYPE: ICD-10-CM

## 2021-09-28 DIAGNOSIS — I10 ESSENTIAL HYPERTENSION: Primary | ICD-10-CM

## 2021-09-28 DIAGNOSIS — E78.2 MIXED HYPERLIPIDEMIA: ICD-10-CM

## 2021-09-28 DIAGNOSIS — E55.9 VITAMIN D DEFICIENCY: ICD-10-CM

## 2021-09-30 ENCOUNTER — TELEPHONE (OUTPATIENT)
Dept: FAMILY MEDICINE | Facility: CLINIC | Age: 76
End: 2021-09-30

## 2021-10-04 RX ORDER — DIPHENOXYLATE HYDROCHLORIDE AND ATROPINE SULFATE 2.5; .025 MG/1; MG/1
1 TABLET ORAL 4 TIMES DAILY PRN
Qty: 120 TABLET | Refills: 3 | Status: SHIPPED | OUTPATIENT
Start: 2021-10-04 | End: 2022-02-28 | Stop reason: SDUPTHER

## 2021-10-12 ENCOUNTER — OFFICE VISIT (OUTPATIENT)
Dept: FAMILY MEDICINE | Facility: CLINIC | Age: 76
End: 2021-10-12
Payer: MEDICARE

## 2021-10-12 VITALS
OXYGEN SATURATION: 99 % | BODY MASS INDEX: 25.19 KG/M2 | TEMPERATURE: 98 F | HEART RATE: 60 BPM | SYSTOLIC BLOOD PRESSURE: 140 MMHG | WEIGHT: 151.19 LBS | RESPIRATION RATE: 18 BRPM | HEIGHT: 65 IN | DIASTOLIC BLOOD PRESSURE: 60 MMHG

## 2021-10-12 DIAGNOSIS — I10 HYPERTENSION, UNSPECIFIED TYPE: Primary | ICD-10-CM

## 2021-10-12 DIAGNOSIS — E78.2 MIXED HYPERLIPIDEMIA: ICD-10-CM

## 2021-10-12 DIAGNOSIS — E03.9 ACQUIRED HYPOTHYROIDISM: ICD-10-CM

## 2021-10-12 DIAGNOSIS — Z00.00 GENERAL MEDICAL EXAM: ICD-10-CM

## 2021-10-12 PROCEDURE — 99999 PR PBB SHADOW E&M-EST. PATIENT-LVL V: ICD-10-PCS | Mod: PBBFAC,,, | Performed by: FAMILY MEDICINE

## 2021-10-12 PROCEDURE — 99999 PR PBB SHADOW E&M-EST. PATIENT-LVL V: CPT | Mod: PBBFAC,,, | Performed by: FAMILY MEDICINE

## 2021-10-12 PROCEDURE — 99214 OFFICE O/P EST MOD 30 MIN: CPT | Mod: S$PBB,,, | Performed by: FAMILY MEDICINE

## 2021-10-12 PROCEDURE — 99215 OFFICE O/P EST HI 40 MIN: CPT | Mod: PBBFAC,PN | Performed by: FAMILY MEDICINE

## 2021-10-12 PROCEDURE — 99214 PR OFFICE/OUTPT VISIT, EST, LEVL IV, 30-39 MIN: ICD-10-PCS | Mod: S$PBB,,, | Performed by: FAMILY MEDICINE

## 2021-10-12 RX ORDER — PHENAZOPYRIDINE HYDROCHLORIDE 200 MG/1
200 TABLET, FILM COATED ORAL 3 TIMES DAILY PRN
Qty: 10 TABLET | Refills: 1 | Status: SHIPPED | OUTPATIENT
Start: 2021-10-12 | End: 2022-03-08 | Stop reason: SDUPTHER

## 2021-10-12 RX ORDER — VALACYCLOVIR HYDROCHLORIDE 1 G/1
1000 TABLET, FILM COATED ORAL 2 TIMES DAILY
Qty: 60 TABLET | Refills: 11 | Status: SHIPPED | OUTPATIENT
Start: 2021-10-12 | End: 2021-12-03

## 2021-10-12 RX ORDER — NEOMYCIN SULFATE, POLYMYXIN B SULFATE AND HYDROCORTISONE 10; 3.5; 1 MG/ML; MG/ML; [USP'U]/ML
3 SUSPENSION/ DROPS AURICULAR (OTIC) 4 TIMES DAILY
Qty: 10 ML | Refills: 0 | Status: SHIPPED | OUTPATIENT
Start: 2021-10-12 | End: 2021-10-19

## 2021-10-25 ENCOUNTER — TELEPHONE (OUTPATIENT)
Dept: FAMILY MEDICINE | Facility: CLINIC | Age: 76
End: 2021-10-25
Payer: MEDICARE

## 2021-11-02 ENCOUNTER — TELEPHONE (OUTPATIENT)
Dept: GASTROENTEROLOGY | Facility: CLINIC | Age: 76
End: 2021-11-02
Payer: MEDICARE

## 2021-11-05 ENCOUNTER — TELEPHONE (OUTPATIENT)
Dept: FAMILY MEDICINE | Facility: CLINIC | Age: 76
End: 2021-11-05
Payer: MEDICARE

## 2021-11-17 ENCOUNTER — PES CALL (OUTPATIENT)
Dept: ADMINISTRATIVE | Facility: CLINIC | Age: 76
End: 2021-11-17
Payer: MEDICARE

## 2021-12-02 ENCOUNTER — PATIENT OUTREACH (OUTPATIENT)
Dept: ADMINISTRATIVE | Facility: OTHER | Age: 76
End: 2021-12-02
Payer: MEDICARE

## 2021-12-03 ENCOUNTER — OFFICE VISIT (OUTPATIENT)
Dept: OBSTETRICS AND GYNECOLOGY | Facility: CLINIC | Age: 76
End: 2021-12-03
Payer: MEDICARE

## 2021-12-03 VITALS
BODY MASS INDEX: 25.33 KG/M2 | DIASTOLIC BLOOD PRESSURE: 66 MMHG | RESPIRATION RATE: 15 BRPM | WEIGHT: 152 LBS | HEIGHT: 65 IN | SYSTOLIC BLOOD PRESSURE: 142 MMHG | HEART RATE: 79 BPM

## 2021-12-03 DIAGNOSIS — Z01.419 WELL WOMAN EXAM WITH ROUTINE GYNECOLOGICAL EXAM: Primary | ICD-10-CM

## 2021-12-03 DIAGNOSIS — N39.41 URGE INCONTINENCE: ICD-10-CM

## 2021-12-03 DIAGNOSIS — Z12.31 ENCOUNTER FOR SCREENING MAMMOGRAM FOR MALIGNANT NEOPLASM OF BREAST: ICD-10-CM

## 2021-12-03 DIAGNOSIS — Z00.00 GENERAL MEDICAL EXAM: ICD-10-CM

## 2021-12-03 PROCEDURE — 99214 OFFICE O/P EST MOD 30 MIN: CPT | Mod: PBBFAC,PO,25 | Performed by: OBSTETRICS & GYNECOLOGY

## 2021-12-03 PROCEDURE — 99999 PR PBB SHADOW E&M-EST. PATIENT-LVL IV: ICD-10-PCS | Mod: PBBFAC,,, | Performed by: OBSTETRICS & GYNECOLOGY

## 2021-12-03 PROCEDURE — 99999 PR PBB SHADOW E&M-EST. PATIENT-LVL IV: CPT | Mod: PBBFAC,,, | Performed by: OBSTETRICS & GYNECOLOGY

## 2021-12-03 PROCEDURE — G0101 CA SCREEN;PELVIC/BREAST EXAM: HCPCS | Mod: PBBFAC,PO | Performed by: OBSTETRICS & GYNECOLOGY

## 2021-12-03 PROCEDURE — G0101 CA SCREEN;PELVIC/BREAST EXAM: HCPCS | Mod: S$PBB,,, | Performed by: OBSTETRICS & GYNECOLOGY

## 2021-12-03 PROCEDURE — G0101 PR CA SCREEN;PELVIC/BREAST EXAM: ICD-10-PCS | Mod: S$PBB,,, | Performed by: OBSTETRICS & GYNECOLOGY

## 2021-12-07 ENCOUNTER — TELEPHONE (OUTPATIENT)
Dept: FAMILY MEDICINE | Facility: CLINIC | Age: 76
End: 2021-12-07
Payer: MEDICARE

## 2021-12-21 ENCOUNTER — OFFICE VISIT (OUTPATIENT)
Dept: GASTROENTEROLOGY | Facility: CLINIC | Age: 76
End: 2021-12-21
Payer: MEDICARE

## 2021-12-21 VITALS
WEIGHT: 154.75 LBS | RESPIRATION RATE: 17 BRPM | DIASTOLIC BLOOD PRESSURE: 82 MMHG | OXYGEN SATURATION: 98 % | SYSTOLIC BLOOD PRESSURE: 140 MMHG | HEIGHT: 65 IN | HEART RATE: 60 BPM | BODY MASS INDEX: 25.78 KG/M2

## 2021-12-21 DIAGNOSIS — K52.9 CHRONIC DIARRHEA: Primary | ICD-10-CM

## 2021-12-21 PROCEDURE — 99213 OFFICE O/P EST LOW 20 MIN: CPT | Mod: S$PBB,,, | Performed by: INTERNAL MEDICINE

## 2021-12-21 PROCEDURE — 99999 PR PBB SHADOW E&M-EST. PATIENT-LVL IV: CPT | Mod: PBBFAC,,, | Performed by: INTERNAL MEDICINE

## 2021-12-21 PROCEDURE — 99999 PR PBB SHADOW E&M-EST. PATIENT-LVL IV: ICD-10-PCS | Mod: PBBFAC,,, | Performed by: INTERNAL MEDICINE

## 2021-12-21 PROCEDURE — 99213 PR OFFICE/OUTPT VISIT, EST, LEVL III, 20-29 MIN: ICD-10-PCS | Mod: S$PBB,,, | Performed by: INTERNAL MEDICINE

## 2021-12-21 PROCEDURE — 99214 OFFICE O/P EST MOD 30 MIN: CPT | Mod: PBBFAC,PO | Performed by: INTERNAL MEDICINE

## 2022-01-04 ENCOUNTER — PES CALL (OUTPATIENT)
Dept: ADMINISTRATIVE | Facility: CLINIC | Age: 77
End: 2022-01-04
Payer: MEDICARE

## 2022-02-10 ENCOUNTER — PES CALL (OUTPATIENT)
Dept: ADMINISTRATIVE | Facility: CLINIC | Age: 77
End: 2022-02-10
Payer: MEDICARE

## 2022-02-22 ENCOUNTER — TELEPHONE (OUTPATIENT)
Dept: FAMILY MEDICINE | Facility: CLINIC | Age: 77
End: 2022-02-22
Payer: MEDICARE

## 2022-02-22 DIAGNOSIS — Z00.00 GENERAL MEDICAL EXAM: ICD-10-CM

## 2022-02-22 DIAGNOSIS — E11.9 DIABETES MELLITUS WITHOUT COMPLICATION: ICD-10-CM

## 2022-02-22 DIAGNOSIS — E03.9 ACQUIRED HYPOTHYROIDISM: ICD-10-CM

## 2022-02-22 DIAGNOSIS — I10 ESSENTIAL HYPERTENSION: ICD-10-CM

## 2022-02-22 DIAGNOSIS — E78.2 MIXED HYPERLIPIDEMIA: ICD-10-CM

## 2022-02-22 DIAGNOSIS — I10 HYPERTENSION, UNSPECIFIED TYPE: Primary | ICD-10-CM

## 2022-02-22 NOTE — TELEPHONE ENCOUNTER
----- Message from Melania Sevilla sent at 2/22/2022  3:02 PM CST -----  Type:  Needs Medical Advice    Who Called: self  Reason:regarding if you want labs ahead of her appointment   Would the patient rather a call back or a response via [facility]? call  Best Call Back Number: [phone]  Additional Information: none

## 2022-02-28 ENCOUNTER — TELEPHONE (OUTPATIENT)
Dept: ENDOSCOPY | Facility: HOSPITAL | Age: 77
End: 2022-02-28
Payer: MEDICARE

## 2022-02-28 ENCOUNTER — TELEPHONE (OUTPATIENT)
Dept: ENDOSCOPY | Facility: HOSPITAL | Age: 77
End: 2022-02-28

## 2022-02-28 RX ORDER — DIPHENOXYLATE HYDROCHLORIDE AND ATROPINE SULFATE 2.5; .025 MG/1; MG/1
1 TABLET ORAL 4 TIMES DAILY PRN
Qty: 120 TABLET | Refills: 3 | Status: SHIPPED | OUTPATIENT
Start: 2022-02-28 | End: 2022-03-02 | Stop reason: SDUPTHER

## 2022-02-28 NOTE — TELEPHONE ENCOUNTER
----- Message from Shahram Bronson sent at 2/28/2022  2:42 PM CST -----  Contact: PATIENT  Pt received a missed call from nurse in regards to prescription    Call back @[phone]

## 2022-02-28 NOTE — TELEPHONE ENCOUNTER
----- Message from Elizabeth Rosario sent at 2/28/2022  8:49 AM CST -----  Elizabeth Gleason calling regarding  refill losartan-hydrochlorothiazide 100-12.5 mg (HYZAAR) 100-12.5 mg Tab      [facility] [address]  [address] [place] [place] [address]  Phone: [phone] Fax: [phone]

## 2022-02-28 NOTE — TELEPHONE ENCOUNTER
----- Message from Julissa Vaughan sent at 2/28/2022  4:37 PM CST -----  Contact: Patient  Patient missed call. Requesting call back      Patient@[phone] ([place])

## 2022-02-28 NOTE — TELEPHONE ENCOUNTER
----- Message from Elizabeth Rosario sent at 2/28/2022  8:04 AM CST -----  Elizabeth Gleason calling regarding Refill (message) for diphenoxylate-atropine 2.5-0.025 mg (LOMOTIL) 2.5-0.025 mg per tablet.  She would like a call back [phone]     Please send one month to [facility] and the remaining to [facility].  She is out that's why she need it to go to her location pharmacy for a month refill      [facility]/pharmacy   [phone],   [place] [address]  Phone: [phone]

## 2022-03-02 RX ORDER — DIPHENOXYLATE HYDROCHLORIDE AND ATROPINE SULFATE 2.5; .025 MG/1; MG/1
1 TABLET ORAL 4 TIMES DAILY PRN
Qty: 120 TABLET | Refills: 3 | Status: SHIPPED | OUTPATIENT
Start: 2022-03-02 | End: 2022-07-06 | Stop reason: SDUPTHER

## 2022-03-08 ENCOUNTER — OFFICE VISIT (OUTPATIENT)
Dept: FAMILY MEDICINE | Facility: CLINIC | Age: 77
End: 2022-03-08
Payer: MEDICARE

## 2022-03-08 VITALS
DIASTOLIC BLOOD PRESSURE: 78 MMHG | BODY MASS INDEX: 26.39 KG/M2 | HEIGHT: 65 IN | WEIGHT: 158.38 LBS | SYSTOLIC BLOOD PRESSURE: 138 MMHG

## 2022-03-08 DIAGNOSIS — I10 HYPERTENSION, UNSPECIFIED TYPE: Primary | ICD-10-CM

## 2022-03-08 DIAGNOSIS — F41.9 ANXIETY: ICD-10-CM

## 2022-03-08 DIAGNOSIS — I10 ESSENTIAL HYPERTENSION: ICD-10-CM

## 2022-03-08 DIAGNOSIS — E78.2 MIXED HYPERLIPIDEMIA: ICD-10-CM

## 2022-03-08 DIAGNOSIS — E03.9 ACQUIRED HYPOTHYROIDISM: ICD-10-CM

## 2022-03-08 PROCEDURE — 99999 PR PBB SHADOW E&M-EST. PATIENT-LVL IV: ICD-10-PCS | Mod: PBBFAC,,, | Performed by: FAMILY MEDICINE

## 2022-03-08 PROCEDURE — 99214 PR OFFICE/OUTPT VISIT, EST, LEVL IV, 30-39 MIN: ICD-10-PCS | Mod: S$PBB,,, | Performed by: FAMILY MEDICINE

## 2022-03-08 PROCEDURE — 99214 OFFICE O/P EST MOD 30 MIN: CPT | Mod: S$PBB,,, | Performed by: FAMILY MEDICINE

## 2022-03-08 PROCEDURE — 99214 OFFICE O/P EST MOD 30 MIN: CPT | Mod: PBBFAC,PN | Performed by: FAMILY MEDICINE

## 2022-03-08 PROCEDURE — 99999 PR PBB SHADOW E&M-EST. PATIENT-LVL IV: CPT | Mod: PBBFAC,,, | Performed by: FAMILY MEDICINE

## 2022-03-08 RX ORDER — DIAZEPAM 5 MG/1
TABLET ORAL
Qty: 20 TABLET | Refills: 0 | Status: SHIPPED | OUTPATIENT
Start: 2022-03-08 | End: 2022-09-08 | Stop reason: SDUPTHER

## 2022-03-08 RX ORDER — PHENAZOPYRIDINE HYDROCHLORIDE 200 MG/1
200 TABLET, FILM COATED ORAL 3 TIMES DAILY PRN
Qty: 10 TABLET | Refills: 1 | Status: SHIPPED | OUTPATIENT
Start: 2022-03-08 | End: 2022-09-08 | Stop reason: SDUPTHER

## 2022-03-08 RX ORDER — NEOMYCIN SULFATE, POLYMYXIN B SULFATE AND HYDROCORTISONE 10; 3.5; 1 MG/ML; MG/ML; [USP'U]/ML
3 SUSPENSION/ DROPS AURICULAR (OTIC)
Qty: 10 ML | Refills: 0 | Status: SHIPPED | OUTPATIENT
Start: 2022-03-08 | End: 2022-09-08 | Stop reason: SDUPTHER

## 2022-03-08 NOTE — PROGRESS NOTES
[facility] Note    Chief Complaint      Chief Complaint   Patient presents with    Follow-up     History of Present Illness      Elizabeth Gleason is a 76 y.o. female who presents today with:    Patient is here for six-month follow-up on hypertension hyperlipidemia.  Conditions have been stable for the past 6 months.  Went over recent lab results with her.  Her hyperlipidemia appears to be stable and slightly improved while on a low-dose statin.  We are not able to increase statin dose because patient gets extreme myalgias.  So will keep patient on the 10 mg of statin.  Her blood pressure is well controlled.  Her sugar levels look good and kidney function is fine.    Anxiety and hypothyroid is stable as well.     Problem List Items Addressed This Visit        Cardiac/Vascular    Hypertension - Primary    Mixed hyperlipidemia    Overview     The patient has been very poorly tolerated statin therapy, she can tolerate pitavastatin 1 mg nightly.  I had added bempedoic acid, but she developed myalgia on this also, subsequently discontinued              Endocrine    Acquired hypothyroidism      Other Visit Diagnoses     Anxiety        Relevant Medications    diazePAM (VALIUM) 5 MG tablet    Essential hypertension              Health Maintenance   Topic Date Due    TETANUS VACCINE  Never done    DEXA Scan  03/08/2022 (Originally 10/26/2019)    Mammogram  01/05/2023    Lipid Panel  03/04/2023    Hepatitis C Screening  Completed    High Dose Statin  Discontinued       Past Medical History:   Diagnosis Date    ALLERGIC RHINITIS     Chronic diarrhea     GERD (gastroesophageal reflux disease)     HEARING LOSS     Hypertension     Impaired fasting glucose     Osteopenia     Tuberculosis     childhood TB       Past Surgical History:   Procedure Laterality Date    COLONOSCOPY N/A 8/25/2017    Procedure: COLONOSCOPY;  Surgeon: Keny Lui MD;  Location: [address]);  Service:  Endoscopy;  Laterality: N/A;    left 5th toe surgery      rt ear surgery      rt lung lobectomy and a rib removed      secondary to TB    TUBAL LIGATION         family history includes Breast cancer in her cousin; Cancer in her brother; Heart disease in her mother; Kidney disease in her mother; No Known Problems in her son; Stroke in her father.     Social History     Tobacco Use    Smoking status: Never Smoker    Smokeless tobacco: Never Used   Substance Use Topics    Alcohol use: Yes     Alcohol/week: 0.0 standard drinks     Comment: maybe 1--2 monthly    Drug use: No       Review of Systems   Constitutional: Negative for chills, fever, malaise/fatigue and weight loss.   HENT: Negative for congestion, ear discharge and ear pain.    Eyes: Negative for blurred vision.   Respiratory: Negative for cough and shortness of breath.    Cardiovascular: Negative for chest pain and leg swelling.   Gastrointestinal: Negative for abdominal pain, constipation, diarrhea and vomiting.   Genitourinary: Negative for dysuria.   Musculoskeletal: Negative for myalgias.   Skin: Negative for rash.   Neurological: Negative for dizziness, tingling, focal weakness, weakness and headaches.   Endo/Heme/Allergies: Does not bruise/bleed easily.   Psychiatric/Behavioral: Negative for depression and suicidal ideas.        Outpatient Encounter Medications as of 3/8/2022   Medication Sig Dispense Refill    acetaminophen (TYLENOL ARTHRITIS PAIN ORAL) Take by mouth as needed.       amLODIPine (NORVASC) 2.5 MG tablet Take 1 tablet (2.5 mg total) by mouth nightly. 90 tablet 3    ascorbic acid/multivit-min (EMERGEN-C ORAL) Take by mouth every other day.      aspirin (ECOTRIN) 81 MG EC tablet Take 81 mg by mouth once daily.      blood sugar diagnostic Strp 1 freestyle lite test strip weekly 50 strip 11    blood sugar diagnostic Strp 1 strip by Misc.(Non-Drug; Combo Route) route 2 (two) times a day. 180 strip 11    diphenoxylate-atropine  2.5-0.025 mg (LOMOTIL) 2.5-0.025 mg per tablet Take 1 tablet by mouth 4 (four) times daily as needed for Diarrhea. 120 tablet 3    fexofenadine (ALLEGRA) 180 MG tablet Take 1 tablet (180 mg total) by mouth once daily. (Patient taking differently: Take 180 mg by mouth daily as needed.) 90 tablet 3    ibuprofen (ADVIL,MOTRIN) 200 MG tablet Take 200 mg by mouth as needed for Pain.      lancets Misc To check BG 1 times daily, to use with insurance preferred meter 50 each 11    levothyroxine (SYNTHROID) 88 MCG tablet Take 1 tablet (88 mcg total) by mouth once daily. 90 tablet 3    losartan-hydrochlorothiazide 100-12.5 mg (HYZAAR) 100-12.5 mg Tab Take 1 tablet by mouth once daily. 90 tablet 3    multivit-min/iron/folic/lutein (CENTRUM SILVER WOMEN ORAL) Take by mouth once daily.       pitavastatin calcium (LIVALO) 1 mg Tab tablet Take 1 tablet (1 mg total) by mouth every evening. 90 tablet 3    psyllium (METAMUCIL) packet Take 1 packet by mouth every other day.       [DISCONTINUED] diazePAM (VALIUM) 5 MG tablet 1 po qd prn 20 tablet 0    [DISCONTINUED] neomycin-polymyxin-hydrocortisone (CORTISPORIN) 3.5-10,000-1 mg/mL-unit/mL-% otic suspension Place 3 drops into the right ear 4 (four) times daily. (Patient taking differently: Place 3 drops into the right ear as needed.) 10 mL 2    [DISCONTINUED] phenazopyridine (PYRIDIUM) 200 MG tablet Take 1 tablet (200 mg total) by mouth 3 (three) times daily as needed for Pain. 10 tablet 1    blood-glucose meter (FREESTYLE FREEDOM LITE) kit Use as instructed 1 each 0    diazePAM (VALIUM) 5 MG tablet 1 po qd prn 20 tablet 0    EScitalopram oxalate (LEXAPRO) 20 MG tablet Take 1 tablet (20 mg total) by mouth once daily. 90 tablet 3    neomycin-polymyxin-hydrocortisone (CORTISPORIN) 3.5-10,000-1 mg/mL-unit/mL-% otic suspension Place 3 drops into the right ear as needed (ear pain). 10 mL 0    phenazopyridine (PYRIDIUM) 200 MG tablet Take 1 tablet (200 mg total) by mouth 3  "(three) times daily as needed for Pain. 10 tablet 1     No facility-administered encounter medications on file as of 3/8/2022.       Review of patient's allergies indicates:   Allergen Reactions    Bempedoic acid      Patient developed myopathy    Levaquin [levofloxacin]      Heart raced and felt faint    Sulfa (sulfonamide antibiotics) Nausea And Vomiting    Tramadol Nausea And Vomiting       Physical Exam      Vital Signs  BP: 138/78  Pain Score: 0-No pain  Height and Weight  Height: 5' 5" (165.1 cm)  Weight: 71.8 kg (158 lb 6.4 oz)  BSA (Calculated - sq m): 1.82 sq meters  BMI (Calculated): 26.4  Weight in (lb) to have BMI = 25: 149.9]    Physical Exam  Vitals reviewed.   Constitutional:       General: She is not in acute distress.     Appearance: Normal appearance. She is normal weight. She is not ill-appearing.   HENT:      Head: Normocephalic.      Right Ear: Tympanic membrane normal.      Left Ear: Tympanic membrane normal.      Nose: Nose normal.      Mouth/Throat:      Mouth: Mucous membranes are moist.      Pharynx: Oropharynx is clear.   Eyes:      Extraocular Movements: Extraocular movements intact.      Conjunctiva/sclera: Conjunctivae normal.      Pupils: Pupils are equal, round, and reactive to light.   Cardiovascular:      Rate and Rhythm: Normal rate and regular rhythm.      Pulses: Normal pulses.      Heart sounds: Normal heart sounds.   Pulmonary:      Effort: Pulmonary effort is normal.      Breath sounds: Normal breath sounds.   Abdominal:      General: Abdomen is flat. Bowel sounds are normal. There is no distension.      Palpations: Abdomen is soft.      Tenderness: There is no abdominal tenderness.   Musculoskeletal:         General: Normal range of motion.      Cervical back: Normal range of motion and neck supple.   Skin:     General: Skin is warm and dry.      Capillary Refill: Capillary refill takes less than 2 seconds.      Findings: No rash.   Neurological:      General: No focal " deficit present.      Mental Status: She is alert and oriented to person, place, and time.      Motor: No weakness.      Gait: Gait normal.   Psychiatric:         Mood and Affect: Mood normal.         Behavior: Behavior normal.         Thought Content: Thought content normal.         Judgment: Judgment normal.          Laboratory:  CBC:  Recent Labs   Lab Result Units 03/04/22  0903   WBC K/uL 8.36   RBC M/uL 4.33   Hemoglobin g/dL 12.9   Hematocrit % 39.5   Platelets K/uL 386   MCV fL 91   MCH pg 29.8   MCHC g/dL 32.7     CMP:  Recent Labs   Lab Result Units 03/04/22  0903   Glucose mg/dL 119*   Calcium mg/dL 9.2   Albumin g/dL 4.5   Total Protein g/dL 7.7   Sodium mmol/L 144   Potassium mmol/L 4.1   CO2 mmol/L 33*   Chloride mmol/L 98   BUN mg/dL 17   Alkaline Phosphatase U/L 90   ALT U/L 26   AST U/L 31   Total Bilirubin mg/dL 0.5     URINALYSIS:  No results for input(s): COLORU, CLARITYU, SPECGRAV, PHUR, PROTEINUA, GLUCOSEU, BILIRUBINCON, BLOODU, WBCU, RBCU, BACTERIA, MUCUS, NITRITE, LEUKOCYTESUR, UROBILINOGEN, HYALINECASTS in the last 2160 hours.   LIPIDS:  Recent Labs   Lab Result Units 03/04/22  0903   TSH uIU/mL 0.720   HDL mg/dL 39*   Cholesterol mg/dL 219*   Triglycerides mg/dL 316*   LDL Cholesterol mg/dL 116.8   HDL/Cholesterol Ratio % 17.8*   Non-HDL Cholesterol mg/dL 180   Total Cholesterol/HDL Ratio  5.6*     TSH:  Recent Labs   Lab Result Units 03/04/22  0903   TSH uIU/mL 0.720     A1C:  Recent Labs   Lab Result Units 03/04/22  0903   Hemoglobin A1C % 5.4       Radiology:      Assessment/Plan     Elizabeth Gleason is a 76 y.o.female with:    1. Anxiety  - diazePAM (VALIUM) 5 MG tablet; 1 po qd prn  Dispense: 20 tablet; Refill: 0    2. Hypertension, unspecified type    3. Acquired hypothyroidism    4. Mixed hyperlipidemia    5. Essential hypertension  Chronic conditions stable.  Will continue to monitor.     Follow up as discussed    If symptoms worsen or do not improve return to clinic, go to  Urgent Care or ER  Take Medications as directed      -Continue current medications and maintain follow up with specialists.         Samy Cotter Jr, MD  [facility]

## 2022-03-15 ENCOUNTER — PES CALL (OUTPATIENT)
Dept: ADMINISTRATIVE | Facility: CLINIC | Age: 77
End: 2022-03-15
Payer: MEDICARE

## 2022-04-13 DIAGNOSIS — E78.2 MIXED HYPERLIPIDEMIA: ICD-10-CM

## 2022-04-13 DIAGNOSIS — F41.9 ANXIETY: ICD-10-CM

## 2022-04-13 DIAGNOSIS — I10 ESSENTIAL HYPERTENSION: Chronic | ICD-10-CM

## 2022-04-13 NOTE — TELEPHONE ENCOUNTER
----- Message from Michael Gerard sent at 4/13/2022  9:35 AM CDT -----  Contact: pt.  .Type:  Needs Medical Advice    Who Called: pt    Pharmacy [facility] and phone #:  [facility] [address] [facility]   Phone: [phone]  Fax:  [phone]  Would the patient rather a call back or a response via [facility]? Call back  Best Call Back Number: [phone]   Additional Information: Pt. Is requesting a refill losartan-hydrochlorothiazide 100-12.5 mg (HYZAAR) 100-12.5 mg Tab,  Disp Refills Start End SRINI  amLODIPine (NORVASC) 2.5 MG tablet, EScitalopram oxalate (LEXAPRO) 20 MG tablet 90 tablet and pitavastatin calcium (LIVALO) 1 mg Tab tablet

## 2022-04-13 NOTE — TELEPHONE ENCOUNTER
No new care gaps identified.  Powered by [employer]. Reference number: [id number].   4/13/2022 4:16:39 PM CDT

## 2022-04-14 RX ORDER — AMLODIPINE BESYLATE 2.5 MG/1
2.5 TABLET ORAL NIGHTLY
Qty: 90 TABLET | Refills: 3 | Status: SHIPPED | OUTPATIENT
Start: 2022-04-14 | End: 2022-09-08 | Stop reason: SDUPTHER

## 2022-04-14 RX ORDER — PITAVASTATIN CALCIUM 1.04 MG/1
1 TABLET, FILM COATED ORAL NIGHTLY
Qty: 90 TABLET | Refills: 3 | Status: SHIPPED | OUTPATIENT
Start: 2022-04-14 | End: 2022-09-08 | Stop reason: SDUPTHER

## 2022-04-14 RX ORDER — ESCITALOPRAM OXALATE 20 MG/1
20 TABLET ORAL DAILY
Qty: 90 TABLET | Refills: 3 | Status: SHIPPED | OUTPATIENT
Start: 2022-04-14 | End: 2022-09-08 | Stop reason: SDUPTHER

## 2022-04-14 RX ORDER — LOSARTAN POTASSIUM AND HYDROCHLOROTHIAZIDE 12.5; 1 MG/1; MG/1
1 TABLET ORAL DAILY
Qty: 90 TABLET | Refills: 3 | Status: SHIPPED | OUTPATIENT
Start: 2022-04-14 | End: 2022-09-08 | Stop reason: SDUPTHER

## 2022-04-26 ENCOUNTER — TELEPHONE (OUTPATIENT)
Dept: FAMILY MEDICINE | Facility: CLINIC | Age: 77
End: 2022-04-26
Payer: MEDICARE

## 2022-04-26 NOTE — TELEPHONE ENCOUNTER
----- Message from Дмитрий Barrera sent at 4/26/2022 10:55 AM CDT -----  Type:  Patient Requesting Call Back    Who Called Elizabeth Gleason  Would the patient rather a call back or a response via [facility]? Call back  Best Call Back Number:[phone]  Additional Information: Patient requesting call back for sooner appointment due to ear pain.

## 2022-04-27 ENCOUNTER — OFFICE VISIT (OUTPATIENT)
Dept: FAMILY MEDICINE | Facility: CLINIC | Age: 77
End: 2022-04-27
Payer: MEDICARE

## 2022-04-27 VITALS
WEIGHT: 159.31 LBS | DIASTOLIC BLOOD PRESSURE: 70 MMHG | BODY MASS INDEX: 26.54 KG/M2 | HEIGHT: 65 IN | SYSTOLIC BLOOD PRESSURE: 154 MMHG

## 2022-04-27 DIAGNOSIS — E03.9 HYPOTHYROIDISM, UNSPECIFIED TYPE: ICD-10-CM

## 2022-04-27 DIAGNOSIS — H66.002 NON-RECURRENT ACUTE SUPPURATIVE OTITIS MEDIA OF LEFT EAR WITHOUT SPONTANEOUS RUPTURE OF TYMPANIC MEMBRANE: Primary | ICD-10-CM

## 2022-04-27 PROCEDURE — 99999 PR PBB SHADOW E&M-EST. PATIENT-LVL IV: ICD-10-PCS | Mod: PBBFAC,,, | Performed by: FAMILY MEDICINE

## 2022-04-27 PROCEDURE — 99214 OFFICE O/P EST MOD 30 MIN: CPT | Mod: PBBFAC,PN | Performed by: FAMILY MEDICINE

## 2022-04-27 PROCEDURE — 99214 PR OFFICE/OUTPT VISIT, EST, LEVL IV, 30-39 MIN: ICD-10-PCS | Mod: S$PBB,,, | Performed by: FAMILY MEDICINE

## 2022-04-27 PROCEDURE — 99999 PR PBB SHADOW E&M-EST. PATIENT-LVL IV: CPT | Mod: PBBFAC,,, | Performed by: FAMILY MEDICINE

## 2022-04-27 PROCEDURE — 99214 OFFICE O/P EST MOD 30 MIN: CPT | Mod: S$PBB,,, | Performed by: FAMILY MEDICINE

## 2022-04-27 RX ORDER — LEVOTHYROXINE SODIUM 88 UG/1
88 TABLET ORAL DAILY
Qty: 90 TABLET | Refills: 3 | Status: SHIPPED | OUTPATIENT
Start: 2022-04-27 | End: 2022-09-08 | Stop reason: SDUPTHER

## 2022-04-27 RX ORDER — AMOXICILLIN AND CLAVULANATE POTASSIUM 875; 125 MG/1; MG/1
1 TABLET, FILM COATED ORAL EVERY 12 HOURS
Qty: 20 TABLET | Refills: 0 | Status: SHIPPED | OUTPATIENT
Start: 2022-04-27 | End: 2022-05-07

## 2022-04-27 RX ORDER — AZELASTINE 1 MG/ML
1 SPRAY, METERED NASAL 2 TIMES DAILY
Qty: 30 ML | Refills: 0 | Status: SHIPPED | OUTPATIENT
Start: 2022-04-27 | End: 2023-04-04

## 2022-04-27 NOTE — PROGRESS NOTES
[facility] Note    Chief Complaint      Chief Complaint   Patient presents with    Otalgia    Sore Throat    Headache     History of Present Illness     Elizabeth Gleason is a 76 y.o. female who presents today with:    Just had teeth work done on Monday.  Symptoms started the next day.      Ear Fullness   There is pain in the left ear. This is a new problem. The current episode started in the past 7 days. The problem occurs constantly. The problem has been rapidly worsening. There has been no fever. The pain is moderate. Pertinent negatives include no abdominal pain, coughing, diarrhea, ear discharge, headaches, hearing loss, neck pain, rash, rhinorrhea, sore throat or vomiting. She has tried nothing for the symptoms. There is no history of a chronic ear infection, hearing loss or a tympanostomy tube.       Problem List Items Addressed This Visit    None     Visit Diagnoses     Non-recurrent acute suppurative otitis media of left ear without spontaneous rupture of tympanic membrane    -  Primary    Relevant Medications    amoxicillin-clavulanate 875-125mg (AUGMENTIN) 875-125 mg per tablet    azelastine (ASTELIN) 137 mcg (0.1 %) nasal spray          Health Maintenance   Topic Date Due    TETANUS VACCINE  Never done    DEXA Scan  10/26/2019    Mammogram  01/05/2023    Lipid Panel  03/04/2023    Hepatitis C Screening  Completed    High Dose Statin  Discontinued       Past Medical History:   Diagnosis Date    ALLERGIC RHINITIS     Chronic diarrhea     GERD (gastroesophageal reflux disease)     HEARING LOSS     Hypertension     Impaired fasting glucose     Osteopenia     Tuberculosis     childhood TB       Past Surgical History:   Procedure Laterality Date    COLONOSCOPY N/A 8/25/2017    Procedure: COLONOSCOPY;  Surgeon: Keny Lui MD;  Location: [address];  Service: Endoscopy;  Laterality: N/A;    left 5th toe surgery      rt ear surgery      rt lung  lobectomy and a rib removed      secondary to TB    TUBAL LIGATION         family history includes Breast cancer in her cousin; Cancer in her brother; Heart disease in her mother; Kidney disease in her mother; No Known Problems in her son; Stroke in her father.     Social History     Tobacco Use    Smoking status: Never Smoker    Smokeless tobacco: Never Used   Substance Use Topics    Alcohol use: Yes     Alcohol/week: 0.0 standard drinks     Comment: maybe 1--2 monthly    Drug use: No       Review of Systems   Constitutional: Negative for chills, fever, malaise/fatigue and weight loss.   HENT: Positive for congestion and ear pain. Negative for ear discharge, hearing loss, rhinorrhea and sore throat.    Eyes: Negative for blurred vision.   Respiratory: Negative for cough and shortness of breath.    Cardiovascular: Negative for chest pain and leg swelling.   Gastrointestinal: Negative for abdominal pain, constipation, diarrhea and vomiting.   Genitourinary: Negative for dysuria.   Musculoskeletal: Negative for myalgias and neck pain.   Skin: Negative for rash.   Neurological: Negative for dizziness, tingling, focal weakness, weakness and headaches.   Endo/Heme/Allergies: Does not bruise/bleed easily.   Psychiatric/Behavioral: Negative for depression and suicidal ideas.        Outpatient Encounter Medications as of 4/27/2022   Medication Sig Dispense Refill    acetaminophen (TYLENOL ARTHRITIS PAIN ORAL) Take by mouth as needed.       amLODIPine (NORVASC) 2.5 MG tablet Take 1 tablet (2.5 mg total) by mouth nightly. 90 tablet 3    ascorbic acid/multivit-min (EMERGEN-C ORAL) Take by mouth every other day.      aspirin (ECOTRIN) 81 MG EC tablet Take 81 mg by mouth once daily.      blood sugar diagnostic Strp 1 freestyle lite test strip weekly 50 strip 11    blood sugar diagnostic Strp 1 strip by Misc.(Non-Drug; Combo Route) route 2 (two) times a day. 180 strip 11    blood-glucose meter (FREESTYLE FREEDOM LITE)  kit Use as instructed 1 each 0    diazePAM (VALIUM) 5 MG tablet 1 po qd prn 20 tablet 0    diphenoxylate-atropine 2.5-0.025 mg (LOMOTIL) 2.5-0.025 mg per tablet Take 1 tablet by mouth 4 (four) times daily as needed for Diarrhea. 120 tablet 3    EScitalopram oxalate (LEXAPRO) 20 MG tablet Take 1 tablet (20 mg total) by mouth once daily. 90 tablet 3    fexofenadine (ALLEGRA) 180 MG tablet Take 1 tablet (180 mg total) by mouth once daily. (Patient taking differently: Take 180 mg by mouth daily as needed.) 90 tablet 3    ibuprofen (ADVIL,MOTRIN) 200 MG tablet Take 200 mg by mouth as needed for Pain.      lancets Misc To check BG 1 times daily, to use with insurance preferred meter 50 each 11    losartan-hydrochlorothiazide 100-12.5 mg (HYZAAR) 100-12.5 mg Tab Take 1 tablet by mouth once daily. 90 tablet 3    multivit-min/iron/folic/lutein (CENTRUM SILVER WOMEN ORAL) Take by mouth once daily.       neomycin-polymyxin-hydrocortisone (CORTISPORIN) 3.5-10,000-1 mg/mL-unit/mL-% otic suspension Place 3 drops into the right ear as needed (ear pain). 10 mL 0    phenazopyridine (PYRIDIUM) 200 MG tablet Take 1 tablet (200 mg total) by mouth 3 (three) times daily as needed for Pain. 10 tablet 1    pitavastatin calcium (LIVALO) 1 mg Tab tablet Take 1 tablet (1 mg total) by mouth every evening. 90 tablet 3    psyllium (METAMUCIL) packet Take 1 packet by mouth every other day.       [DISCONTINUED] levothyroxine (SYNTHROID) 88 MCG tablet Take 1 tablet (88 mcg total) by mouth once daily. 90 tablet 3    amoxicillin-clavulanate 875-125mg (AUGMENTIN) 875-125 mg per tablet Take 1 tablet by mouth every 12 (twelve) hours. for 10 days 20 tablet 0    azelastine (ASTELIN) 137 mcg (0.1 %) nasal spray 1 spray (137 mcg total) by Nasal route 2 (two) times daily. 30 mL 0     No facility-administered encounter medications on file as of 4/27/2022.       Review of patient's allergies indicates:   Allergen Reactions    Bempedoic acid      " Patient developed myopathy    Levaquin [levofloxacin]      Heart raced and felt faint    Sulfa (sulfonamide antibiotics) Nausea And Vomiting    Tramadol Nausea And Vomiting       Physical Exam      Vital Signs  BP: (!) 154/70  Pain Score:   8  Pain Loc: Ear  Height and Weight  Height: 5' 5" (165.1 cm)  Weight: 72.3 kg (159 lb 4.8 oz)  BSA (Calculated - sq m): 1.82 sq meters  BMI (Calculated): 26.5  Weight in (lb) to have BMI = 25: 149.9]    Physical Exam  Constitutional:       General: She is not in acute distress.     Appearance: Normal appearance. She is normal weight. She is not ill-appearing.   HENT:      Head: Normocephalic.      Right Ear: A middle ear effusion is present. Tympanic membrane is injected and erythematous.      Ears:      Comments: TM Dull Bilaterally     Nose: Congestion and rhinorrhea present.      Mouth/Throat:      Mouth: Mucous membranes are moist.      Pharynx: Oropharynx is clear. Posterior oropharyngeal erythema present.   Eyes:      Extraocular Movements: Extraocular movements intact.      Conjunctiva/sclera: Conjunctivae normal.      Pupils: Pupils are equal, round, and reactive to light.   Cardiovascular:      Rate and Rhythm: Normal rate and regular rhythm.      Pulses: Normal pulses.      Heart sounds: Normal heart sounds.   Pulmonary:      Effort: Pulmonary effort is normal.      Breath sounds: Normal breath sounds.   Musculoskeletal:         General: Normal range of motion.      Cervical back: Normal range of motion and neck supple.   Skin:     General: Skin is warm.      Findings: No rash.   Neurological:      General: No focal deficit present.      Mental Status: She is alert.   Psychiatric:         Mood and Affect: Mood normal.          Laboratory:  CBC:  Recent Labs   Lab Result Units 03/04/22  0903   WBC K/uL 8.36   RBC M/uL 4.33   Hemoglobin g/dL 12.9   Hematocrit % 39.5   Platelets K/uL 386   MCV fL 91   MCH pg 29.8   MCHC g/dL 32.7     CMP:  Recent Labs   Lab Result " Units 03/04/22  0903   Glucose mg/dL 119*   Calcium mg/dL 9.2   Albumin g/dL 4.5   Total Protein g/dL 7.7   Sodium mmol/L 144   Potassium mmol/L 4.1   CO2 mmol/L 33*   Chloride mmol/L 98   BUN mg/dL 17   Alkaline Phosphatase U/L 90   ALT U/L 26   AST U/L 31   Total Bilirubin mg/dL 0.5     URINALYSIS:  No results for input(s): COLORU, CLARITYU, SPECGRAV, PHUR, PROTEINUA, GLUCOSEU, BILIRUBINCON, BLOODU, WBCU, RBCU, BACTERIA, MUCUS, NITRITE, LEUKOCYTESUR, UROBILINOGEN, HYALINECASTS in the last 2160 hours.   LIPIDS:  Recent Labs   Lab Result Units 03/04/22  0903   TSH uIU/mL 0.720   HDL mg/dL 39*   Cholesterol mg/dL 219*   Triglycerides mg/dL 316*   LDL Cholesterol mg/dL 116.8   HDL/Cholesterol Ratio % 17.8*   Non-HDL Cholesterol mg/dL 180   Total Cholesterol/HDL Ratio  5.6*     TSH:  Recent Labs   Lab Result Units 03/04/22  0903   TSH uIU/mL 0.720     A1C:  Recent Labs   Lab Result Units 03/04/22  0903   Hemoglobin A1C % 5.4       Radiology:      Assessment/Plan     Elizabeth Gleason is a 76 y.o.female with:    1. Non-recurrent acute suppurative otitis media of left ear without spontaneous rupture of tympanic membrane  - amoxicillin-clavulanate 875-125mg (AUGMENTIN) 875-125 mg per tablet; Take 1 tablet by mouth every 12 (twelve) hours. for 10 days  Dispense: 20 tablet; Refill: 0  - azelastine (ASTELIN) 137 mcg (0.1 %) nasal spray; 1 spray (137 mcg total) by Nasal route 2 (two) times daily.  Dispense: 30 mL; Refill: 0  If symptoms worsen or do not improve return to clinic, go to Urgent Care or ER  Take Medications as directed   Hydrate  Take Mucinex 1200mg twice a day      -Continue current medications and maintain follow up with specialists.         Samy Cotter Jr, MD  [facility]

## 2022-04-27 NOTE — TELEPHONE ENCOUNTER
No new care gaps identified.  Powered by "[employer] by [employer]. Reference number: [id number].   4/27/2022 9:35:21 AM CDT

## 2022-05-18 ENCOUNTER — TELEPHONE (OUTPATIENT)
Dept: FAMILY MEDICINE | Facility: CLINIC | Age: 77
End: 2022-05-18
Payer: MEDICARE

## 2022-05-18 NOTE — TELEPHONE ENCOUNTER
----- Message from Junior Garcia sent at 5/17/2022 11:54 AM CDT -----  Type:  Patient Returning Call    Who Called:pt   Who Left Message for Patient:lino   Does the patient know what this is regarding?:no  Would the patient rather a call back or a response via [facility]? Call   Best Call Back Number: [phone]  Additional Information:     Pt would like a call back

## 2022-05-20 ENCOUNTER — PATIENT OUTREACH (OUTPATIENT)
Dept: ADMINISTRATIVE | Facility: HOSPITAL | Age: 77
End: 2022-05-20
Payer: MEDICARE

## 2022-05-20 ENCOUNTER — TELEPHONE (OUTPATIENT)
Dept: FAMILY MEDICINE | Facility: CLINIC | Age: 77
End: 2022-05-20
Payer: MEDICARE

## 2022-05-20 NOTE — TELEPHONE ENCOUNTER
----- Message from Melania Sevilla sent at 5/20/2022  3:27 PM CDT -----  Type:  Needs Medical Advice    Who Called: self  Reason:left a message earlier and never heard back from anyone   Would the patient rather a call back or a response via [facility]? call  Best Call Back Number:  [phone]           Additional Information: none

## 2022-06-01 ENCOUNTER — TELEPHONE (OUTPATIENT)
Dept: FAMILY MEDICINE | Facility: CLINIC | Age: 77
End: 2022-06-01
Payer: MEDICARE

## 2022-06-01 NOTE — TELEPHONE ENCOUNTER
----- Message from Guillermina Sanchez sent at 6/1/2022 10:51 AM CDT -----  Type:  Needs Medical Advice    Who Called: pt  Symptoms (please be specific):    How long has patient had these symptoms:    Pharmacy name and phone #:    Would the patient rather a call back or a response via [facility]? call  Best Call Back Number: [phone]  Additional Information:  wants to speak to staff. No other information was provided

## 2022-06-04 ENCOUNTER — PES CALL (OUTPATIENT)
Dept: ADMINISTRATIVE | Facility: CLINIC | Age: 77
End: 2022-06-04
Payer: MEDICARE

## 2022-06-13 ENCOUNTER — TELEPHONE (OUTPATIENT)
Dept: FAMILY MEDICINE | Facility: CLINIC | Age: 77
End: 2022-06-13
Payer: MEDICARE

## 2022-06-13 NOTE — TELEPHONE ENCOUNTER
----- Message from Chelita Vela MA sent at 6/13/2022 11:44 AM CDT -----  Contact: [phone]/Self    ----- Message -----  From: Kimberley Carter  Sent: 6/13/2022  11:17 AM CDT  To: Riccardo Yoon Staff    Type:  Sooner Apoointment Request    Caller is requesting a sooner appointment.  Caller declined first available appointment listed below.  Caller will not accept being placed on the waitlist and is requesting a message be sent to doctor.  Name of Caller:Pt   When is the first available appointment?N/a   Symptoms:Refill scripts   Would the patient rather a call back or a response via [facility]? Callback   Best Call Back Number:[phone]  Additional Information: Request to speak with Lori

## 2022-07-06 ENCOUNTER — PATIENT OUTREACH (OUTPATIENT)
Dept: ADMINISTRATIVE | Facility: HOSPITAL | Age: 77
End: 2022-07-06
Payer: MEDICARE

## 2022-07-06 RX ORDER — DIPHENOXYLATE HYDROCHLORIDE AND ATROPINE SULFATE 2.5; .025 MG/1; MG/1
1 TABLET ORAL 4 TIMES DAILY PRN
Qty: 120 TABLET | Refills: 3 | Status: SHIPPED | OUTPATIENT
Start: 2022-07-06 | End: 2022-11-07 | Stop reason: SDUPTHER

## 2022-07-06 NOTE — TELEPHONE ENCOUNTER
----- Message from Giuliananader Lay sent at 7/6/2022 12:29 PM CDT -----  Regarding: Refill  Contact: pt @ [phone]  Rx Refill/Request    Is this a Refill or New Rx:refill    Rx Name and Strength:diphenoxylate-atropine 2.5-0.025 mg (LOMOTIL) 2.5-0.025 mg per tablet    Preferred Pharmacy with phone number:  [facility]  [address] [facility]  [address] [place] [address]  Phone: [phone] Fax: [phone]    Communication Preference:pt @ [phone]    Additional Information:

## 2022-07-22 NOTE — ED TRIAGE NOTES
"Pt c/o burning "like fire " with urination and pressure in pelvic area. Pt also states she is having urinary frequency and blood in urine. Hx of bladder infections. Pt c/o 10/10 pain. Pt took one bactrim prior to arrival to ED   "
1 Hour(s) 17 Minute(s)

## 2022-08-17 ENCOUNTER — PES CALL (OUTPATIENT)
Dept: ADMINISTRATIVE | Facility: OTHER | Age: 77
End: 2022-08-17
Payer: MEDICARE

## 2022-09-08 ENCOUNTER — OFFICE VISIT (OUTPATIENT)
Dept: FAMILY MEDICINE | Facility: CLINIC | Age: 77
End: 2022-09-08
Payer: MEDICARE

## 2022-09-08 VITALS
HEART RATE: 67 BPM | BODY MASS INDEX: 26.38 KG/M2 | WEIGHT: 158.31 LBS | DIASTOLIC BLOOD PRESSURE: 76 MMHG | TEMPERATURE: 98 F | HEIGHT: 65 IN | SYSTOLIC BLOOD PRESSURE: 158 MMHG | OXYGEN SATURATION: 97 %

## 2022-09-08 DIAGNOSIS — R79.9 ABNORMAL FINDING OF BLOOD CHEMISTRY, UNSPECIFIED: ICD-10-CM

## 2022-09-08 DIAGNOSIS — I70.0 AORTIC ATHEROSCLEROSIS: ICD-10-CM

## 2022-09-08 DIAGNOSIS — R30.1 PAINFUL BLADDER SPASM: ICD-10-CM

## 2022-09-08 DIAGNOSIS — K58.0 IRRITABLE BOWEL SYNDROME WITH DIARRHEA: ICD-10-CM

## 2022-09-08 DIAGNOSIS — F41.1 GENERALIZED ANXIETY DISORDER: ICD-10-CM

## 2022-09-08 DIAGNOSIS — I65.23 BILATERAL CAROTID ARTERY STENOSIS: ICD-10-CM

## 2022-09-08 DIAGNOSIS — I10 PRIMARY HYPERTENSION: ICD-10-CM

## 2022-09-08 DIAGNOSIS — E03.9 ACQUIRED HYPOTHYROIDISM: ICD-10-CM

## 2022-09-08 DIAGNOSIS — Z76.89 ENCOUNTER TO ESTABLISH CARE WITH NEW DOCTOR: Primary | ICD-10-CM

## 2022-09-08 DIAGNOSIS — E78.2 MIXED HYPERLIPIDEMIA: ICD-10-CM

## 2022-09-08 DIAGNOSIS — H60.93 RECURRENT OTITIS EXTERNA OF BOTH EARS: ICD-10-CM

## 2022-09-08 DIAGNOSIS — G44.229 CHRONIC TENSION-TYPE HEADACHE, NOT INTRACTABLE: ICD-10-CM

## 2022-09-08 PROBLEM — Z78.0 POSTMENOPAUSAL STATE: Status: RESOLVED | Noted: 2020-09-22 | Resolved: 2022-09-08

## 2022-09-08 PROBLEM — K58.9 SPASTIC COLON: Status: ACTIVE | Noted: 2022-09-08

## 2022-09-08 PROCEDURE — 99214 OFFICE O/P EST MOD 30 MIN: CPT | Mod: S$PBB,,, | Performed by: STUDENT IN AN ORGANIZED HEALTH CARE EDUCATION/TRAINING PROGRAM

## 2022-09-08 PROCEDURE — 99214 OFFICE O/P EST MOD 30 MIN: CPT | Mod: PBBFAC,PN | Performed by: STUDENT IN AN ORGANIZED HEALTH CARE EDUCATION/TRAINING PROGRAM

## 2022-09-08 PROCEDURE — 99999 PR PBB SHADOW E&M-EST. PATIENT-LVL IV: ICD-10-PCS | Mod: PBBFAC,,, | Performed by: STUDENT IN AN ORGANIZED HEALTH CARE EDUCATION/TRAINING PROGRAM

## 2022-09-08 PROCEDURE — 99214 PR OFFICE/OUTPT VISIT, EST, LEVL IV, 30-39 MIN: ICD-10-PCS | Mod: S$PBB,,, | Performed by: STUDENT IN AN ORGANIZED HEALTH CARE EDUCATION/TRAINING PROGRAM

## 2022-09-08 PROCEDURE — 99999 PR PBB SHADOW E&M-EST. PATIENT-LVL IV: CPT | Mod: PBBFAC,,, | Performed by: STUDENT IN AN ORGANIZED HEALTH CARE EDUCATION/TRAINING PROGRAM

## 2022-09-08 RX ORDER — PITAVASTATIN CALCIUM 1.04 MG/1
1 TABLET, FILM COATED ORAL NIGHTLY
Qty: 90 TABLET | Refills: 3 | Status: SHIPPED | OUTPATIENT
Start: 2022-09-08 | End: 2023-06-05 | Stop reason: SDUPTHER

## 2022-09-08 RX ORDER — ICOSAPENT ETHYL 1000 MG/1
1 CAPSULE ORAL 2 TIMES DAILY
Qty: 180 CAPSULE | Refills: 3 | Status: SHIPPED | OUTPATIENT
Start: 2022-09-08 | End: 2023-05-26

## 2022-09-08 RX ORDER — ESCITALOPRAM OXALATE 20 MG/1
20 TABLET ORAL DAILY
Qty: 90 TABLET | Refills: 3 | Status: SHIPPED | OUTPATIENT
Start: 2022-09-08 | End: 2023-06-05 | Stop reason: SDUPTHER

## 2022-09-08 RX ORDER — LEVOTHYROXINE SODIUM 88 UG/1
88 TABLET ORAL DAILY
Qty: 90 TABLET | Refills: 3 | Status: SHIPPED | OUTPATIENT
Start: 2022-09-08 | End: 2023-09-21 | Stop reason: SDUPTHER

## 2022-09-08 RX ORDER — NEOMYCIN SULFATE, POLYMYXIN B SULFATE AND HYDROCORTISONE 10; 3.5; 1 MG/ML; MG/ML; [USP'U]/ML
3 SUSPENSION/ DROPS AURICULAR (OTIC)
Qty: 10 ML | Refills: 0 | Status: SHIPPED | OUTPATIENT
Start: 2022-09-08 | End: 2023-02-07

## 2022-09-08 RX ORDER — AMLODIPINE BESYLATE 2.5 MG/1
2.5 TABLET ORAL NIGHTLY
Qty: 90 TABLET | Refills: 3 | Status: SHIPPED | OUTPATIENT
Start: 2022-09-08 | End: 2023-03-13

## 2022-09-08 RX ORDER — LOSARTAN POTASSIUM AND HYDROCHLOROTHIAZIDE 12.5; 1 MG/1; MG/1
1 TABLET ORAL DAILY
Qty: 90 TABLET | Refills: 3 | Status: SHIPPED | OUTPATIENT
Start: 2022-09-08 | End: 2023-06-05 | Stop reason: SDUPTHER

## 2022-09-08 RX ORDER — DIAZEPAM 5 MG/1
TABLET ORAL
Qty: 20 TABLET | Refills: 0 | Status: SHIPPED | OUTPATIENT
Start: 2022-09-08 | End: 2023-03-13 | Stop reason: SDUPTHER

## 2022-09-08 RX ORDER — PHENAZOPYRIDINE HYDROCHLORIDE 200 MG/1
200 TABLET, FILM COATED ORAL 3 TIMES DAILY PRN
Qty: 10 TABLET | Refills: 1 | Status: SHIPPED | OUTPATIENT
Start: 2022-09-08 | End: 2023-05-26

## 2022-09-08 NOTE — ASSESSMENT & PLAN NOTE
Slightly elevated today   At home is better controlled 140s/70s  amlodpine 2.5   Losartan 100/HCTZ 12.5  Asymptomatic

## 2022-09-08 NOTE — PROGRESS NOTES
Subjective:       Patient ID: Elizabeth Gleason is a 76 y.o. female.    Chief Complaint: Establish Care (Pt here to est care )    Aortic atherosclerosis  Sees cardiology   statin    Spastic colon  Sees GI; Yuriy; Frank henderson; once a year  Lomotil helps       Anxiety disorder  lexapro 20 qd  Valium PRN for anxiety less than once a week   20 pills lasts a whole year       Hypertension  Slightly elevated today   At home is better controlled 140s/70s  amlodpine 2.5   Losartan 100/HCTZ 12.5  Asymptomatic       Mixed hyperlipidemia  pitavastatin 1mg QHS  Intolerance to all other statins; muscle pains; higher doses cause muscle pains     Acquired hypothyroidism  Synthroid 88  Stable for long time   No issues    Tension type headache  Tylenol prn    Bilateral carotid artery stenosis  Sees cards     Review of Systems   Constitutional:  Negative for fever.   HENT: Negative.     Respiratory:  Negative for cough, shortness of breath and wheezing.    Cardiovascular:  Negative for chest pain and leg swelling.   Gastrointestinal:  Negative for abdominal pain, diarrhea, nausea and vomiting.   Genitourinary: Negative.  Negative for difficulty urinating, dysuria and frequency.   Musculoskeletal: Negative.    Neurological: Negative.  Negative for dizziness, numbness and headaches.   Psychiatric/Behavioral:  Negative for dysphoric mood. The patient is not nervous/anxious.     Objective:      Vitals:    09/08/22 1104   BP: (!) 158/76   Pulse: 67   Temp: 97.9 °F (36.6 °C)      Physical Exam  Vitals reviewed.   Constitutional:       Appearance: Normal appearance. She is normal weight.   HENT:      Head: Normocephalic and atraumatic.   Eyes:      Conjunctiva/sclera: Conjunctivae normal.   Cardiovascular:      Rate and Rhythm: Normal rate and regular rhythm.      Heart sounds: Normal heart sounds.   Pulmonary:      Effort: Pulmonary effort is normal.      Breath sounds: Normal breath sounds.   Abdominal:      Palpations: Abdomen is  soft.      Tenderness: There is no abdominal tenderness.   Musculoskeletal:         General: Normal range of motion.      Cervical back: Normal range of motion.      Right lower leg: No edema.      Left lower leg: No edema.   Neurological:      General: No focal deficit present.      Mental Status: She is alert and oriented to person, place, and time.   Psychiatric:         Mood and Affect: Mood normal.         Behavior: Behavior normal.        Assessment:       1. Encounter to establish care with new doctor    2. Primary hypertension    3. Generalized anxiety disorder    4. Mixed hyperlipidemia    5. Aortic atherosclerosis    6. Acquired hypothyroidism    7. Painful bladder spasm    8. Recurrent otitis externa of both ears    9. Irritable bowel syndrome with diarrhea    10. Abnormal finding of blood chemistry, unspecified    11. Chronic tension-type headache, not intractable    12. Bilateral carotid artery stenosis          Plan:   1. Encounter to establish care with new doctor    2. Primary hypertension  - CBC Without Differential; Standing  - Comprehensive Metabolic Panel; Standing  - Hemoglobin A1C; Standing  - TSH; Standing  - amLODIPine (NORVASC) 2.5 MG tablet; Take 1 tablet (2.5 mg total) by mouth nightly.  Dispense: 90 tablet; Refill: 3  - losartan-hydrochlorothiazide 100-12.5 mg (HYZAAR) 100-12.5 mg Tab; Take 1 tablet by mouth once daily.  Dispense: 90 tablet; Refill: 3  - Lipid Panel; Standing    3. Generalized anxiety disorder  - CBC Without Differential; Standing  - Comprehensive Metabolic Panel; Standing  - Hemoglobin A1C; Standing  - EScitalopram oxalate (LEXAPRO) 20 MG tablet; Take 1 tablet (20 mg total) by mouth once daily.  Dispense: 90 tablet; Refill: 3  - diazePAM (VALIUM) 5 MG tablet; 1 po qd prn  Dispense: 20 tablet; Refill: 0    4. Mixed hyperlipidemia  - CBC Without Differential; Standing  - Comprehensive Metabolic Panel; Standing  - Hemoglobin A1C; Standing  - pitavastatin calcium (LIVALO) 1  mg Tab tablet; Take 1 tablet (1 mg total) by mouth every evening.  Dispense: 90 tablet; Refill: 3  - Lipid Panel; Standing    5. Aortic atherosclerosis  - CBC Without Differential; Standing  - Comprehensive Metabolic Panel; Standing  - Hemoglobin A1C; Standing  - TSH; Standing  - T4, Free; Standing  - Lipid Panel; Standing    6. Acquired hypothyroidism  - TSH; Standing  - T4, Free; Standing  - levothyroxine (SYNTHROID) 88 MCG tablet; Take 1 tablet (88 mcg total) by mouth once daily.  Dispense: 90 tablet; Refill: 3    7. Painful bladder spasm  - phenazopyridine (PYRIDIUM) 200 MG tablet; Take 1 tablet (200 mg total) by mouth 3 (three) times daily as needed for Pain.  Dispense: 10 tablet; Refill: 1    8. Recurrent otitis externa of both ears  - neomycin-polymyxin-hydrocortisone (CORTISPORIN) 3.5-10,000-1 mg/mL-unit/mL-% otic suspension; Place 3 drops into the right ear as needed (ear pain).  Dispense: 10 mL; Refill: 0    9. Irritable bowel syndrome with diarrhea    10. Abnormal finding of blood chemistry, unspecified  - Hemoglobin A1C; Standing    11. Chronic tension-type headache, not intractable    12. Bilateral carotid artery stenosis     Establish care  Labs per orders  HM discussed: UTD  Continue healthy lifestyle efforts  Continue current meds as prescribed  Keep routine specialist f/u   RTC in 6 months with labs prior         Sarah A. Champagne [facility]   9/8/22

## 2022-09-08 NOTE — ASSESSMENT & PLAN NOTE
pitavastatin 1mg QHS  Intolerance to all other statins; muscle pains; higher doses cause muscle pains

## 2022-09-16 ENCOUNTER — TELEPHONE (OUTPATIENT)
Dept: FAMILY MEDICINE | Facility: CLINIC | Age: 77
End: 2022-09-16
Payer: MEDICARE

## 2022-09-16 NOTE — TELEPHONE ENCOUNTER
----- Message from Michael Gerard sent at 9/16/2022  4:55 PM CDT -----  Contact: pt  .Type:  Test Results    Who Called: pt  Would the patient rather a call back or a response via [facility] call back  Best Call Back Number: [phone]   Additional Information:  Pt. Is returning a call back to the office to discuss her test results.

## 2022-09-19 ENCOUNTER — TELEPHONE (OUTPATIENT)
Dept: OBSTETRICS AND GYNECOLOGY | Facility: CLINIC | Age: 77
End: 2022-09-19
Payer: MEDICARE

## 2022-09-19 DIAGNOSIS — Z12.31 BREAST CANCER SCREENING BY MAMMOGRAM: Primary | ICD-10-CM

## 2022-09-19 NOTE — TELEPHONE ENCOUNTER
----- Message from Michael Gerard sent at 9/19/2022  1:50 PM CDT -----  Contact: pt  .Type:  Needs Medical Advice    Who Called: pt  Would the patient rather a call back or a response via [facility]? Call back  Best Call Back Number: [phone]  Additional Information: Pt. Needs an annual appt. For after Dec. 3, 2022

## 2022-09-19 NOTE — TELEPHONE ENCOUNTER
----- Message from Becki Atwood sent at 9/19/2022 12:47 PM CDT -----  Type:  Patient  Call    Who Called:Pt   Would the patient rather a call back or a response via [facility]? Call back  Best Call Back Number:[phone]  Additional Information: need to know when she has to schedule for annual for obgyn.. 1 yr or 1 yre + 1day

## 2022-09-19 NOTE — TELEPHONE ENCOUNTER
----- Message from Marisol Jacob MA sent at 9/19/2022  4:09 PM CDT -----  Regarding: Self  Elizabeth Gleason  MRN: [id number]  Home Phone      [phone]  Work Phone      Not on file.  Mobile          [phone]    Patient Care Team:  Selena Warren DO as PCP - General (Family Medicine)  Mable Levy LPN as Care Coordinator (Internal Medicine)  Venessa Arana DPM as Consulting Physician (Podiatry)  Corona Flor MD as Consulting Physician (Hand Surgery)  Tyson Francois IV, MD as Consulting Physician (Ophthalmology)  Christopher Yan MD as Consulting Physician (Gastroenterology)  Gurdeep Lyons MD as Consulting Physician (Dermatology)  Verona Garber MD as Obstetrician (Obstetrics)  Wilton Shirley DPM as Consulting Physician (Podiatry)  Cassius Langley Jr., MD as Consulting Physician (Cardiology)  Emily Mosley MA as Care Coordinator  OB? No  What phone number can you be reached at?   Message:     Please link mammogram orders. Thanks.

## 2022-09-19 NOTE — TELEPHONE ENCOUNTER
----- Message from Michael Gerard sent at 9/19/2022  1:52 PM CDT -----  Contact: pt  .Type:  Mammogram    Caller is requesting to schedule their annual mammogram appointment.  Order is not listed in EPIC.  Please enter order and contact patient to schedule.  Name of Caller:pt  [facility]  Would the patient rather a call back or a response via [facility]? Call back  Best Call Back Number:[phone]  Additional Information:

## 2022-10-19 ENCOUNTER — PES CALL (OUTPATIENT)
Dept: ADMINISTRATIVE | Facility: CLINIC | Age: 77
End: 2022-10-19
Payer: MEDICARE

## 2022-11-03 ENCOUNTER — PATIENT OUTREACH (OUTPATIENT)
Dept: ADMINISTRATIVE | Facility: HOSPITAL | Age: 77
End: 2022-11-03
Payer: MEDICARE

## 2022-11-03 ENCOUNTER — TELEPHONE (OUTPATIENT)
Dept: GASTROENTEROLOGY | Facility: CLINIC | Age: 77
End: 2022-11-03
Payer: MEDICARE

## 2022-11-03 NOTE — TELEPHONE ENCOUNTER
----- Message from Vianney Melendez MA sent at 11/2/2022  1:29 PM CDT -----  Contact: [phone]  Did you call her?  ----- Message -----  From: Zully Morris MA  Sent: 11/2/2022   1:24 PM CDT  To: Yuriy SIEGEL Staff    Patient is returning a car to schedule appt, please advise.

## 2022-11-04 ENCOUNTER — TELEPHONE (OUTPATIENT)
Dept: FAMILY MEDICINE | Facility: CLINIC | Age: 77
End: 2022-11-04
Payer: MEDICARE

## 2022-11-04 NOTE — TELEPHONE ENCOUNTER
Called pt in regards of her message. Pt states that she with with Ms Emily in regards of her needing a remote BP reading. Pt states that her daughter came by her on yesterday to do her BP and it was 146/62. Please advise.

## 2022-11-04 NOTE — TELEPHONE ENCOUNTER
----- Message from Kevan Lee sent at 11/4/2022 11:32 AM CDT -----  .Type:  Needs Medical Advice    Who Called: Pt  Would the patient rather a call back or a response via [facility]? Call  Best Call Back Number: [phone]  Additional Information:   146/62 Blood Pressure  Pt spoke to nurse yesterday and wants to tell nurse.

## 2022-11-07 ENCOUNTER — TELEPHONE (OUTPATIENT)
Dept: ENDOSCOPY | Facility: HOSPITAL | Age: 77
End: 2022-11-07
Payer: MEDICARE

## 2022-11-07 ENCOUNTER — TELEPHONE (OUTPATIENT)
Dept: ADMINISTRATIVE | Facility: HOSPITAL | Age: 77
End: 2022-11-07
Payer: MEDICARE

## 2022-11-07 VITALS — DIASTOLIC BLOOD PRESSURE: 62 MMHG | SYSTOLIC BLOOD PRESSURE: 146 MMHG

## 2022-11-07 RX ORDER — DIPHENOXYLATE HYDROCHLORIDE AND ATROPINE SULFATE 2.5; .025 MG/1; MG/1
1 TABLET ORAL 4 TIMES DAILY PRN
Qty: 120 TABLET | Refills: 3 | Status: SHIPPED | OUTPATIENT
Start: 2022-11-07 | End: 2023-01-09 | Stop reason: SDUPTHER

## 2022-11-07 NOTE — TELEPHONE ENCOUNTER
----- Message from Calista Sheehan sent at 11/7/2022 10:05 AM CST -----  Regarding: Refill & Appointment  Contact: [phone]   Pt is needing a refill on diphenoxylate-atropine 2.5-0.025 mg (LOMOTIL) 2.5-0.025 mg per tablet. She also states she has been trying to get a appointment and she has yet to hear from someone. Please call to discuss further @ [phone]    [id number] [place] - [address] [facility] [address]  [address]  [facility] [address]  Phone: [phone] Fax: [phone]

## 2022-12-07 ENCOUNTER — TELEPHONE (OUTPATIENT)
Dept: GASTROENTEROLOGY | Facility: CLINIC | Age: 77
End: 2022-12-07
Payer: MEDICARE

## 2022-12-07 ENCOUNTER — TELEPHONE (OUTPATIENT)
Dept: ENDOSCOPY | Facility: HOSPITAL | Age: 77
End: 2022-12-07
Payer: MEDICARE

## 2022-12-07 NOTE — TELEPHONE ENCOUNTER
----- Message from Edith Baeza sent at 12/7/2022  3:10 PM CST -----  Regarding: Appointment  Contact: [phone]  Calling to speak with nurse regarding appointment made with Dr Paramjit Saucedo instead of Dr Yan. Please call and discuss with patient.

## 2022-12-07 NOTE — TELEPHONE ENCOUNTER
----- Message from Prasanth Estrada sent at 12/7/2022 12:12 PM CST -----  Contact: @[phone]  Pt is calling in to get an appt, pt has called multiple times and have not received a call to discuss further.

## 2022-12-08 ENCOUNTER — PATIENT OUTREACH (OUTPATIENT)
Dept: ADMINISTRATIVE | Facility: HOSPITAL | Age: 77
End: 2022-12-08
Payer: MEDICARE

## 2022-12-08 NOTE — PROGRESS NOTES
Non-compliant report chart audits. Chart review completed for HM test overdue (Mammogram, Colon Cancer Screening, Pap smear, DM labs, BP Check and/or Eye Exam)      Care Everywhere and media, updates requested and reviewed.        Attempted to contact pt about hypertension management, LMOR. Letter mailed.

## 2022-12-08 NOTE — LETTER
December 8, 2022    Elizabeth Gleason  [address] [place] [address]             [facility]  [address]  [facility] [place] [facility] [address]  Phone: [phone] Dear Mrs. Gleason:    We are reaching out to you in reference to your blood pressure management.    I was performing a review of your chart, and I noticed that the last Blood Pressure reading on file was elevated.      Have you been checking your Blood Pressure at home?  If so, what was your most recent reading?    If you do not have a blood pressure cuff at home we can schedule you to come in for a nurse visit.    Please call if you have any questions.        Sincerely,          Selena Warren DO and your [facility]

## 2023-01-09 RX ORDER — DIPHENOXYLATE HYDROCHLORIDE AND ATROPINE SULFATE 2.5; .025 MG/1; MG/1
1 TABLET ORAL 4 TIMES DAILY PRN
Qty: 120 TABLET | Refills: 3 | Status: SHIPPED | OUTPATIENT
Start: 2023-01-09 | End: 2023-01-11 | Stop reason: SDUPTHER

## 2023-01-09 NOTE — TELEPHONE ENCOUNTER
----- Message from Edith Baeza sent at 1/9/2023  1:34 PM CST -----  Regarding: Recall Appointment/Refills  Contact: [phone]  Calling to schedule recall appointment and request refill on Rx diphenoxylate-atropine 2.5-0.025 mg (LOMOTIL) 2.5-0.025 mg per tablet sent to pharmacy. Please call to confirm it has been sent to pharmacy.     [facility] [address]  [address] [place] [address]  Phone: [phone] Fax: [phone]

## 2023-01-10 ENCOUNTER — TELEPHONE (OUTPATIENT)
Dept: GASTROENTEROLOGY | Facility: CLINIC | Age: 78
End: 2023-01-10
Payer: MEDICARE

## 2023-01-10 NOTE — TELEPHONE ENCOUNTER
Dr. Yan pt states [facility] does not accept avila pt states it needs to be faxed

## 2023-01-11 RX ORDER — DIPHENOXYLATE HYDROCHLORIDE AND ATROPINE SULFATE 2.5; .025 MG/1; MG/1
1 TABLET ORAL 4 TIMES DAILY PRN
Qty: 120 TABLET | Refills: 3 | Status: SHIPPED | OUTPATIENT
Start: 2023-01-11 | End: 2023-02-07

## 2023-02-07 ENCOUNTER — OFFICE VISIT (OUTPATIENT)
Dept: OBSTETRICS AND GYNECOLOGY | Facility: CLINIC | Age: 78
End: 2023-02-07
Payer: MEDICARE

## 2023-02-07 VITALS
DIASTOLIC BLOOD PRESSURE: 64 MMHG | BODY MASS INDEX: 25.69 KG/M2 | SYSTOLIC BLOOD PRESSURE: 142 MMHG | WEIGHT: 154.19 LBS | RESPIRATION RATE: 15 BRPM | HEART RATE: 60 BPM | OXYGEN SATURATION: 98 % | HEIGHT: 65 IN

## 2023-02-07 DIAGNOSIS — Z01.419 ENCNTR FOR GYN EXAM (GENERAL) (ROUTINE) W/O ABN FINDINGS: Primary | ICD-10-CM

## 2023-02-07 PROCEDURE — G0101 CA SCREEN;PELVIC/BREAST EXAM: HCPCS | Mod: GZ,S$PBB,, | Performed by: STUDENT IN AN ORGANIZED HEALTH CARE EDUCATION/TRAINING PROGRAM

## 2023-02-07 PROCEDURE — G0101 PR CA SCREEN;PELVIC/BREAST EXAM: ICD-10-PCS | Mod: GZ,S$PBB,, | Performed by: STUDENT IN AN ORGANIZED HEALTH CARE EDUCATION/TRAINING PROGRAM

## 2023-02-07 PROCEDURE — 99999 PR PBB SHADOW E&M-EST. PATIENT-LVL IV: CPT | Mod: PBBFAC,,, | Performed by: STUDENT IN AN ORGANIZED HEALTH CARE EDUCATION/TRAINING PROGRAM

## 2023-02-07 PROCEDURE — 99214 OFFICE O/P EST MOD 30 MIN: CPT | Mod: PBBFAC,PO | Performed by: STUDENT IN AN ORGANIZED HEALTH CARE EDUCATION/TRAINING PROGRAM

## 2023-02-07 PROCEDURE — G0101 CA SCREEN;PELVIC/BREAST EXAM: HCPCS | Mod: PBBFAC,PO | Performed by: STUDENT IN AN ORGANIZED HEALTH CARE EDUCATION/TRAINING PROGRAM

## 2023-02-07 PROCEDURE — 99999 PR PBB SHADOW E&M-EST. PATIENT-LVL IV: ICD-10-PCS | Mod: PBBFAC,,, | Performed by: STUDENT IN AN ORGANIZED HEALTH CARE EDUCATION/TRAINING PROGRAM

## 2023-02-07 NOTE — PROGRESS NOTES
Subjective:    Patient ID: Elizabeth Gleason is a 77 y.o. y.o. female.     Chief Complaint: Annual Well Woman Exam     History of Present Illness:  Elizabeth presents today for Annual Well Woman exam. She describes her menses as  absent since menopause .She denies pelvic pain.  She denies breast tenderness, masses, nipple discharge. She denies difficulty with urination or bowel movements. She denies menopausal symptoms such as hotflashes, vaginal dryness, and night sweats. She denies bloating, early satiety, or weight changes. She is not currently sexually active.     Menstrual History:   No LMP recorded. Patient is postmenopausal..     OB History          1    Para   1    Term   1            AB        Living   1         SAB        IAB        Ectopic        Multiple        Live Births   1                 The following portions of the patient's history were reviewed and updated as appropriate: allergies, current medications, past family history, past medical history, past social history, past surgical history, and problem list.    ROS:   CONSTITUTIONAL: Negative for fever, chills, diaphoresis, weakness, fatigue, weight loss, weight gain  ENT: negative for sore throat, nasal congestion, nasal discharge, epistaxis, tinnitus, hearing loss  EYES: negative for blurry vision, decreased vision, loss of vision, eye pain, diplopia, photophobia, discharge  SKIN: Negative for rash, itching, hives  RESPIRATORY: negative for cough, hemoptysis, shortness of breath, pleuritic chest pain, wheezing  CARDIOVASCULAR: negative for chest pain, dyspnea on exertion, orthopnea, paroxysmal nocturnal dyspnea, edema, palpitations  BREAST: negative for breast  tenderness, breast mass, nipple discharge, or skin changes  GASTROINTESTINAL: negative for abdominal pain, flank pain, nausea, vomiting, diarrhea, constipation, black stool, blood in stool  GENITOURINARY: negative for abnormal vaginal bleeding, amenorrhea, decreased  libido, dysuria, genital sores, hematuria, incontinence, menorrhagia, pelvic pain, urinary frequency, vaginal discharge  HEMATOLOGIC/LYMPHATIC: negative for swollen lymph nodes, bleeding, bruising  MUSCULOSKELETAL: negative for back pain, joint pain, joint stiffness, joint swelling, muscle pain, muscle weakness  NEUROLOGICAL: negative for dizzy/vertigo, headache, focal weakness, numbness/tingling, speech problems, loss of consciousness, confusion, memory loss  BEHAVORIAL/PSYCH: negative for anxiety, depression, psychosis  ENDOCRINE: negative for polydipsia/polyuria, palpitations, skin changes, temperature intolerance, unexpected weight changes  ALLERGIC/IMMUNOLOGIC: negative for urticaria, hay fever, angioedema      Objective:    Vital Signs:  Vitals:    02/07/23 1517   BP: (!) 142/64   Pulse: 60   Resp: 15       Physical Exam:  General:  alert, cooperative, no distress   Skin:  Skin color, texture, turgor normal. No rashes or lesions   HEENT:  conjunctivae/corneas clear. PERRL.   Neck: supple, trachea midline, no adenopathy or thyromegally   Respiratory:  Normal effort   Breasts:  no discharge, erythema, tenderness, or palpable masses; no axillary lymphadenopathy   Abdomen:  soft, nontender, no palpable masses   Pelvis: External genitalia: normal general appearance  Urinary system: urethral meatus normal, bladder nontender  Vaginal: atrophic mucosa  Cervix: normal appearance  Uterus: normal size, shape, position  Adnexa: normal size, nontender bilaterally   Extremities: Normal ROM; no edema, no cyanosis   Neurologial: Normal strength and tone. No focal numbness or weakness.   Psychiatric: normal mood, speech, dress, and thought processes     LABS:  No result found     A1C:  Recent Labs   Lab 09/08/22  1159   Hemoglobin A1C 5.4     CBC:  Recent Labs   Lab 03/04/22  0903 09/08/22  1159   WBC 8.36 8.16   RBC 4.33 4.21   Hemoglobin 12.9 12.6   Hematocrit 39.5 38.7   Platelets 386 380   MCV 91 92   MCH 29.8 29.9   MCHC  32.7 32.6     CMP:  Recent Labs   Lab 03/04/22  0903 09/08/22  1159   Glucose 119 H 109   Calcium 9.2 9.4   Albumin 4.5 4.7   Total Protein 7.7 7.9   Sodium 144 141   Potassium 4.1 4.4   CO2 33 H 31 H   Chloride 98 102   BUN 17 18 H   Creatinine 0.71 0.72   Alkaline Phosphatase 90 92   ALT 26 26   AST 31 31   Total Bilirubin 0.5 0.5     LIPIDS:  Recent Labs   Lab 10/08/21  0944 10/08/21  0945 03/04/22  0903 09/08/22  1159   TSH  --    < > 0.720 0.383 L   HDL 38 L  --  39 L  --    Cholesterol 224 H  --  219 H  --    Triglycerides 273 H  --  316 H  --    LDL Cholesterol 131.4  --  116.8  --    HDL/Cholesterol Ratio 17.0 L  --  17.8 L  --    Non-HDL Cholesterol 186  --  180  --    Total Cholesterol/HDL Ratio 5.9 H  --  5.6 H  --     < > = values in this interval not displayed.     TSH:  Recent Labs   Lab 03/04/22  0903 09/08/22  1159   TSH 0.720 0.383 L       Assessment:       Healthy female exam.     1. Encntr for gyn exam (general) (routine) w/o abn findings          Plan:      Problem List Items Addressed This Visit    None  Visit Diagnoses       Encntr for gyn exam (general) (routine) w/o abn findings    -  Primary              COUNSELING:  Elizabeth was counseled on A.C.O.G. recommendations for yearly pelvic exams in addition to recommendations for monthly self breast exams; to see her PCP for other health maintenance.

## 2023-02-23 ENCOUNTER — TELEPHONE (OUTPATIENT)
Dept: FAMILY MEDICINE | Facility: CLINIC | Age: 78
End: 2023-02-23
Payer: MEDICARE

## 2023-02-27 ENCOUNTER — PATIENT OUTREACH (OUTPATIENT)
Dept: ADMINISTRATIVE | Facility: HOSPITAL | Age: 78
End: 2023-02-27
Payer: MEDICARE

## 2023-02-27 NOTE — PROGRESS NOTES
Care Everywhere updates requested and reviewed.  Immunizations reconciled. Media reports reviewed.  Duplicate HM overrides and  orders removed.  Overdue HM topic chart audit and/or requested.  Overdue lab testing linked to upcoming lab appointments if applies.        Health Maintenance Due   Topic Date Due    COVID-19 Vaccine (1) Never done    TETANUS VACCINE  Never done    Shingles Vaccine (1 of 2) Never done

## 2023-03-06 ENCOUNTER — OFFICE VISIT (OUTPATIENT)
Dept: GASTROENTEROLOGY | Facility: CLINIC | Age: 78
End: 2023-03-06
Payer: MEDICARE

## 2023-03-06 VITALS
HEIGHT: 65 IN | BODY MASS INDEX: 25.93 KG/M2 | HEART RATE: 58 BPM | DIASTOLIC BLOOD PRESSURE: 64 MMHG | SYSTOLIC BLOOD PRESSURE: 170 MMHG | WEIGHT: 155.63 LBS

## 2023-03-06 DIAGNOSIS — K52.9 CHRONIC DIARRHEA: Primary | ICD-10-CM

## 2023-03-06 PROCEDURE — 99999 PR PBB SHADOW E&M-EST. PATIENT-LVL III: CPT | Mod: PBBFAC,,, | Performed by: INTERNAL MEDICINE

## 2023-03-06 PROCEDURE — 99214 OFFICE O/P EST MOD 30 MIN: CPT | Mod: S$PBB,,, | Performed by: INTERNAL MEDICINE

## 2023-03-06 PROCEDURE — 99214 PR OFFICE/OUTPT VISIT, EST, LEVL IV, 30-39 MIN: ICD-10-PCS | Mod: S$PBB,,, | Performed by: INTERNAL MEDICINE

## 2023-03-06 PROCEDURE — 99999 PR PBB SHADOW E&M-EST. PATIENT-LVL III: ICD-10-PCS | Mod: PBBFAC,,, | Performed by: INTERNAL MEDICINE

## 2023-03-06 PROCEDURE — 99213 OFFICE O/P EST LOW 20 MIN: CPT | Mod: PBBFAC | Performed by: INTERNAL MEDICINE

## 2023-03-06 NOTE — PROGRESS NOTES
REASON FOR VISIT:  Chronic Diarrhea      HISTORY OF PRESENT ILLNESS:  Ms. Gleason is a 77-year-old lady last seen in Dec, 2021 for chronic diarrhea. She has HTN, HLP and hypothyroidism. She had been complaining of loose bowel movements with urge fecal incontinence, which has not responded to probiotics or Bentyl. In the past, she has undergone stool testing for C. diff, routine cultures, Giardia, negative ova, parasites and no evidence of white cells.  She has also been tested for pancreatic insufficiency with stool for fecal elastase and was negative.  Prior colonoscopy for screening from 2017 was unremarkable.  She had previously seen the surgeon, Dr. Lui, for fecal urgency and digital rectal exam revealed good tone and defecography was normal as well.  Previously we had discussed about repeating colonoscopy with biopsies to evaluate for microscopic colitis, however she did not want to pursue it. No dysphagia or odynophagia. No heartburn or acid regurgitation.     Her symptoms are well controlled on Lomotil 1 tablet 4 times a day (9 am -12 noon-3 pm-6 pm).  She has also been taking Metamucil 1 teaspoon as needed. She has noted that her stools are more formed and she moves her bowels once or twice a day now. She is tolerating Lomotil well with no side effects.     PAST MEDICAL, SURGICAL, SOCIAL AND FAMILY HISTORY:  Reviewed.     MEDICATIONS AND ALLERGIES:  Reviewed.     REVIEW OF SYSTEMS:  CONSTITUTIONAL:  No fever, no chills, no weight loss.  Appetite is normal.  EYES:  No visual changes.  ENT:  No odynophagia or hoarseness of voice; right hearing defect  CARDIOVASCULAR:  No angina or palpitation.  RESPIRATORY:  No shortness of breath or wheezing.  GASTROINTESTINAL:  See HPI.  No blood in the stool.  PSYCHIATRIC: Feels more anxious recently     PHYSICAL EXAMINATION:  VITAL SIGNS:  See EPIC.  GENERAL:  Awake, alert and oriented x3, no acute distress.  Neck:  Supple, no carotid bruits, no cervical  adenopathy  Cardiovascular:  S1, S2 normal, no murmurs or gallops  Respiratory:  Bilateral air entry equal no rhonchi or crackles  Abdomen:  Soft, nondistended, nontender, no organomegaly appreciated, bowel sounds are normal, no abdominal bruits heard  Lower Extremities: No pedal edema     Labs from March and Sept 2022 reviewed.        About 30 minutes spent with the patient with more than 50% in counseling         IMPRESSION: Chronic diarrhea well controlled on Lomotil 3 times daily with Metamucil 1 tbsp every other day.     RECOMMENDATION:     1. Continue metamucil 1 teaspoon daily and Lomotil 1 qid for diarrhea (medication needs to be faxed to [facility])  2. Follow up in 1 year or sooner with any issues.

## 2023-03-13 ENCOUNTER — OFFICE VISIT (OUTPATIENT)
Dept: FAMILY MEDICINE | Facility: CLINIC | Age: 78
End: 2023-03-13
Payer: MEDICARE

## 2023-03-13 ENCOUNTER — TELEPHONE (OUTPATIENT)
Dept: FAMILY MEDICINE | Facility: CLINIC | Age: 78
End: 2023-03-13
Payer: MEDICARE

## 2023-03-13 VITALS
OXYGEN SATURATION: 98 % | SYSTOLIC BLOOD PRESSURE: 134 MMHG | TEMPERATURE: 98 F | DIASTOLIC BLOOD PRESSURE: 70 MMHG | HEART RATE: 73 BPM | HEIGHT: 65 IN | WEIGHT: 155 LBS | BODY MASS INDEX: 25.83 KG/M2

## 2023-03-13 DIAGNOSIS — I65.23 BILATERAL CAROTID ARTERY STENOSIS: ICD-10-CM

## 2023-03-13 DIAGNOSIS — J30.9 CHRONIC ALLERGIC RHINITIS: ICD-10-CM

## 2023-03-13 DIAGNOSIS — K58.0 IRRITABLE BOWEL SYNDROME WITH DIARRHEA: ICD-10-CM

## 2023-03-13 DIAGNOSIS — M85.89 OSTEOPENIA OF MULTIPLE SITES: ICD-10-CM

## 2023-03-13 DIAGNOSIS — R73.01 IMPAIRED FASTING GLUCOSE: ICD-10-CM

## 2023-03-13 DIAGNOSIS — I70.0 AORTIC ATHEROSCLEROSIS: ICD-10-CM

## 2023-03-13 DIAGNOSIS — Z78.9 STATIN INTOLERANCE: ICD-10-CM

## 2023-03-13 DIAGNOSIS — E78.2 MIXED HYPERLIPIDEMIA: ICD-10-CM

## 2023-03-13 DIAGNOSIS — E03.9 ACQUIRED HYPOTHYROIDISM: ICD-10-CM

## 2023-03-13 DIAGNOSIS — I10 PRIMARY HYPERTENSION: Primary | ICD-10-CM

## 2023-03-13 DIAGNOSIS — F41.1 GENERALIZED ANXIETY DISORDER: ICD-10-CM

## 2023-03-13 PROCEDURE — 99999 PR PBB SHADOW E&M-EST. PATIENT-LVL V: ICD-10-PCS | Mod: PBBFAC,,, | Performed by: STUDENT IN AN ORGANIZED HEALTH CARE EDUCATION/TRAINING PROGRAM

## 2023-03-13 PROCEDURE — 99215 OFFICE O/P EST HI 40 MIN: CPT | Mod: S$PBB,,, | Performed by: STUDENT IN AN ORGANIZED HEALTH CARE EDUCATION/TRAINING PROGRAM

## 2023-03-13 PROCEDURE — 99215 PR OFFICE/OUTPT VISIT, EST, LEVL V, 40-54 MIN: ICD-10-PCS | Mod: S$PBB,,, | Performed by: STUDENT IN AN ORGANIZED HEALTH CARE EDUCATION/TRAINING PROGRAM

## 2023-03-13 PROCEDURE — 99215 OFFICE O/P EST HI 40 MIN: CPT | Mod: PBBFAC,PN | Performed by: STUDENT IN AN ORGANIZED HEALTH CARE EDUCATION/TRAINING PROGRAM

## 2023-03-13 PROCEDURE — 99999 PR PBB SHADOW E&M-EST. PATIENT-LVL V: CPT | Mod: PBBFAC,,, | Performed by: STUDENT IN AN ORGANIZED HEALTH CARE EDUCATION/TRAINING PROGRAM

## 2023-03-13 RX ORDER — DIAZEPAM 5 MG/1
TABLET ORAL
Qty: 20 TABLET | Refills: 0 | Status: SHIPPED | OUTPATIENT
Start: 2023-03-13 | End: 2023-06-05 | Stop reason: SDUPTHER

## 2023-03-13 NOTE — PROGRESS NOTES
Subjective:       Patient ID: Elizabeth Gleason is a 77 y.o. female.    Chief Complaint: Presents to clinic complaining of feeling hyper and anxious, and experiencing tremors to hands intermittently for the last couple of months. States symptoms are worse than normal. Has taken 20 pills of Valium over time since the last visit due to symptoms. Currently has one pill remaining.       Active Problem List with Overview Notes    Diagnosis Date Noted    Spastic colon 09/08/2022     Lomotil PRN   Follows with GI; Yuriy; annually       Statin intolerance 03/08/2021     Follows with cards  Tolerates pitavastatin 1mg QHS       Aortic atherosclerosis 01/10/2020     Asa and statin       Mixed hyperlipidemia 03/01/2018     Controlled  pitavastatin 1mg QHS   Tolerates   Has hx of statin intolerance in past; follows with cards       Anxiety disorder 03/01/2018     lexapro 20   Valium PRN; less than weekly  1 RX lasts minimum 6 months;  review confirms   She has been having more anxiety lately with tremors, restlessness, hyperactive; has taken all 20 pills in last 6 months valium       Acquired hypothyroidism 11/16/2017     Synthroid 88  Asymptomatic   TSH WNL      Tension type headache 06/10/2016    Hypertension      Well controlled  Losartan/HCTZ 100/12.5  Off amlodipine 2.5 due to low pressures, symptomatic   Asymptomatic         Impaired fasting glucose      Diet controlled  A1C 5.6; elevated fasting glucose       Osteopenia      Ca/Vit D   strength training       Bilateral carotid artery stenosis      Follows with cards  Statin and asa          Review of Systems   Constitutional: Negative.    Respiratory: Negative.     Cardiovascular: Negative.    Neurological:  Positive for tremors.   Psychiatric/Behavioral:  The patient is nervous/anxious and is hyperactive.    All other systems reviewed and are negative.     A1C:  Recent Labs   Lab 03/04/22  0903 09/08/22  1159 03/10/23  0732   Hemoglobin A1C 5.4 5.4 5.6      CBC:  Recent Labs   Lab 03/04/22  0903 09/08/22  1159 03/10/23  0732   WBC 8.36 8.16 7.24   RBC 4.33 4.21 4.28   Hemoglobin 12.9 12.6 12.7   Hematocrit 39.5 38.7 38.5   Platelets 386 380 336   MCV 91 92 90   MCH 29.8 29.9 29.7   MCHC 32.7 32.6 33.0     CMP:  Recent Labs   Lab 03/04/22  0903 09/08/22  1159 03/10/23  0732   Glucose 119 H 109 110   Calcium 9.2 9.4 9.2   Albumin 4.5 4.7 4.6   Total Protein 7.7 7.9 7.3   Sodium 144 141 143   Potassium 4.1 4.4 4.0   CO2 33 H 31 H 31 H   Chloride 98 102 104   BUN 17 18 H 15   Creatinine 0.71 0.72 0.58   Alkaline Phosphatase 90 92 80   ALT 26 26 26   AST 31 31 31   Total Bilirubin 0.5 0.5 0.3     LIPIDS:  Recent Labs   Lab 10/08/21  0944 10/08/21  0945 03/04/22  0903 09/08/22  1159 03/10/23  0732   TSH  --    < > 0.720   < > 0.518   HDL 38 L  --  39 L  --  39 L   Cholesterol 224 H  --  219 H  --  189   Triglycerides 273 H  --  316 H  --  246 H   LDL Cholesterol 131.4  --  116.8  --  100.8   HDL/Cholesterol Ratio 17.0 L  --  17.8 L  --  20.6   Non-HDL Cholesterol 186  --  180  --  150   Total Cholesterol/HDL Ratio 5.9 H  --  5.6 H  --  4.8    < > = values in this interval not displayed.     TSH:  Recent Labs   Lab 03/04/22  0903 09/08/22  1159 03/10/23  0732   TSH 0.720 0.383 L 0.518        Objective:      Vitals:    03/13/23 1034   BP: 134/70   Pulse: 73   Temp: 98.1 °F (36.7 °C)      Physical Exam  Vitals reviewed.   Constitutional:       Appearance: Normal appearance. She is normal weight.   HENT:      Head: Normocephalic and atraumatic.   Eyes:      Conjunctiva/sclera: Conjunctivae normal.   Cardiovascular:      Rate and Rhythm: Normal rate and regular rhythm.      Heart sounds: Normal heart sounds.   Pulmonary:      Effort: Pulmonary effort is normal.      Breath sounds: Normal breath sounds.   Abdominal:      Palpations: Abdomen is soft.      Tenderness: There is no abdominal tenderness.   Musculoskeletal:         General: Normal range of motion.      Cervical  back: Normal range of motion.      Right lower leg: No edema.      Left lower leg: No edema.   Neurological:      General: No focal deficit present.      Mental Status: She is alert and oriented to person, place, and time.   Psychiatric:         Mood and Affect: Mood is anxious.         Behavior: Behavior normal.        Assessment:       1. Primary hypertension    2. Mixed hyperlipidemia    3. Generalized anxiety disorder    4. Acquired hypothyroidism    5. Chronic allergic rhinitis    6. Bilateral carotid artery stenosis    7. Aortic atherosclerosis    8. Impaired fasting glucose    9. Irritable bowel syndrome with diarrhea    10. Osteopenia of multiple sites    11. Statin intolerance          Plan:   1. Primary hypertension    2. Mixed hyperlipidemia    3. Generalized anxiety disorder  - diazePAM (VALIUM) 5 MG tablet; 1 po qd prn  Dispense: 20 tablet; Refill: 0    4. Acquired hypothyroidism  - TSH; Standing  - T4, Free; Standing  - T3; Standing    5. Chronic allergic rhinitis  - Ambulatory referral/consult to ENT; Future    6. Bilateral carotid artery stenosis    7. Aortic atherosclerosis    8. Impaired fasting glucose    9. Irritable bowel syndrome with diarrhea    10. Osteopenia of multiple sites    11. Statin intolerance       LILIANA Glover Student  [facility]   3/13/23     I hereby acknowledge that I am relying upon documentation authored by a NP student working under my supervision and further I hereby attest that I have verified the student documentation or findings by personally re-performing the physical exam and medical decision making activities of the Evaluation and Management service to be billed.    Labs reviewed in detail  Will add T3 for next lab check to r/o as cause of symptoms   Chronic sinus issues will refer to ENT as this may be contributing to symptoms   Continue healthy lifestyle efforts  Continue current meds as prescribed otherwise; refills per request  Keep routine  specialist f/u   RTC in 6 months  with labs prior and/or PRN         Selena Warren DO   [facility]  3/13/23       > 40 min spend with pt face to face and on chart review

## 2023-04-04 ENCOUNTER — CLINICAL SUPPORT (OUTPATIENT)
Dept: OTOLARYNGOLOGY | Facility: CLINIC | Age: 78
End: 2023-04-04
Payer: MEDICARE

## 2023-04-04 ENCOUNTER — OFFICE VISIT (OUTPATIENT)
Dept: OTOLARYNGOLOGY | Facility: CLINIC | Age: 78
End: 2023-04-04
Payer: MEDICARE

## 2023-04-04 VITALS
HEART RATE: 84 BPM | WEIGHT: 155 LBS | BODY MASS INDEX: 25.83 KG/M2 | SYSTOLIC BLOOD PRESSURE: 178 MMHG | DIASTOLIC BLOOD PRESSURE: 80 MMHG | HEIGHT: 65 IN

## 2023-04-04 DIAGNOSIS — H93.13 TINNITUS, BILATERAL: ICD-10-CM

## 2023-04-04 DIAGNOSIS — H61.22 IMPACTED CERUMEN OF LEFT EAR: ICD-10-CM

## 2023-04-04 DIAGNOSIS — H90.A31 MIXED CONDUCTIVE AND SENSORINEURAL HEARING LOSS OF RIGHT EAR WITH RESTRICTED HEARING OF LEFT EAR: ICD-10-CM

## 2023-04-04 DIAGNOSIS — J30.9 CHRONIC ALLERGIC RHINITIS: Primary | ICD-10-CM

## 2023-04-04 DIAGNOSIS — H93.A9 PULSATILE TINNITUS, UNSPECIFIED EAR: ICD-10-CM

## 2023-04-04 DIAGNOSIS — H91.8X3 ASYMMETRICAL HEARING LOSS: ICD-10-CM

## 2023-04-04 DIAGNOSIS — H90.A31 MIXED CONDUCTIVE AND SENSORINEURAL HEARING LOSS OF RIGHT EAR WITH RESTRICTED HEARING OF LEFT EAR: Primary | ICD-10-CM

## 2023-04-04 PROCEDURE — 69210 REMOVE IMPACTED EAR WAX UNI: CPT | Mod: PBBFAC,PO | Performed by: NURSE PRACTITIONER

## 2023-04-04 PROCEDURE — 99215 OFFICE O/P EST HI 40 MIN: CPT | Mod: PBBFAC,PO | Performed by: NURSE PRACTITIONER

## 2023-04-04 PROCEDURE — 99203 OFFICE O/P NEW LOW 30 MIN: CPT | Mod: S$PBB,25,, | Performed by: NURSE PRACTITIONER

## 2023-04-04 PROCEDURE — 92567 TYMPANOMETRY: CPT | Mod: PBBFAC,PO

## 2023-04-04 PROCEDURE — 99999 PR PBB SHADOW E&M-EST. PATIENT-LVL V: ICD-10-PCS | Mod: PBBFAC,,, | Performed by: NURSE PRACTITIONER

## 2023-04-04 PROCEDURE — 69210 EAR CERUMEN REMOVAL: ICD-10-PCS | Mod: S$PBB,,, | Performed by: NURSE PRACTITIONER

## 2023-04-04 PROCEDURE — 92557 COMPREHENSIVE HEARING TEST: CPT | Mod: PBBFAC,PO

## 2023-04-04 PROCEDURE — 99203 PR OFFICE/OUTPT VISIT, NEW, LEVL III, 30-44 MIN: ICD-10-PCS | Mod: S$PBB,25,, | Performed by: NURSE PRACTITIONER

## 2023-04-04 PROCEDURE — 69210 REMOVE IMPACTED EAR WAX UNI: CPT | Mod: PBBFAC | Performed by: NURSE PRACTITIONER

## 2023-04-04 PROCEDURE — 99999 PR PBB SHADOW E&M-EST. PATIENT-LVL V: CPT | Mod: PBBFAC,,, | Performed by: NURSE PRACTITIONER

## 2023-04-04 PROCEDURE — 69210 REMOVE IMPACTED EAR WAX UNI: CPT | Mod: S$PBB,,, | Performed by: NURSE PRACTITIONER

## 2023-04-04 RX ORDER — FLUTICASONE PROPIONATE 50 MCG
2 SPRAY, SUSPENSION (ML) NASAL DAILY
Qty: 9.9 ML | Refills: 11 | Status: SHIPPED | OUTPATIENT
Start: 2023-04-04 | End: 2023-05-26

## 2023-04-04 RX ORDER — DIPHENOXYLATE HYDROCHLORIDE AND ATROPINE SULFATE 2.5; .025 MG/1; MG/1
1 TABLET ORAL 4 TIMES DAILY PRN
COMMUNITY
End: 2023-06-14 | Stop reason: SDUPTHER

## 2023-04-04 RX ORDER — MINERAL OIL
180 ENEMA (ML) RECTAL DAILY
Qty: 30 TABLET | Refills: 12 | Status: SHIPPED | OUTPATIENT
Start: 2023-04-04 | End: 2023-05-26

## 2023-04-04 RX ORDER — AZELASTINE 1 MG/ML
1 SPRAY, METERED NASAL 2 TIMES DAILY
Qty: 30 ML | Refills: 11 | Status: SHIPPED | OUTPATIENT
Start: 2023-04-04 | End: 2024-04-03

## 2023-04-04 NOTE — PROCEDURES
Ear Cerumen Removal    Date/Time: 4/4/2023 1:40 PM  Performed by: Page Barajas NP  Authorized by: Page Barajas NP     Consent Done?:  Yes (Verbal)  Location details:  Left ear  Procedure type: curette    Procedure type comment:  Suction  Cerumen  Removal Results:  Cerumen completely removed  Patient tolerance:  Patient tolerated the procedure well with no immediate complications

## 2023-04-04 NOTE — PROGRESS NOTES
Chief Complaint   Patient presents with    Allergic Rhinitis     Cerumen Impaction        HPI: Elizabeth Gleason referred to me by Dr. Selena Warren in consultation who is here for evaluation of possible allergic rhinitis. Patient's symptoms include clear rhinorrhea, cough, nasal congestion, and postnasal drip. These symptoms are perennial. Current triggers include exposure to pollens, dust, and weather changes. The patient has been suffering from these symptoms for approximately many years. The patient has tried over the counter medications with good relief of symptoms. Immunotherapy has never been tried. The patient has never had nasal polyps. The patient has no history of asthma. The patient does not suffer from frequent sinopulmonary infections. The patient has not had sinus surgery in the past. The patient has no history of eczema.  She is on Allegra.   She reports hx of right mastoidectomy with removal of her right tympanic membrane when she was 23 years old. Reports of hearing loss and tinnitus in the right ear for many years. She is also complaining pulsatile tinnitus in the left ear that has been several months. She is hoping to get hearing aid to help with her right ear.         Past Medical History:   Diagnosis Date    ALLERGIC RHINITIS     Chronic diarrhea     GERD (gastroesophageal reflux disease)     HEARING LOSS     Hypertension     Impaired fasting glucose     Osteopenia     Tuberculosis     childhood TB     Social History     Socioeconomic History    Marital status:    Occupational History    Occupation: retired teller   Tobacco Use    Smoking status: Never    Smokeless tobacco: Never   Substance and Sexual Activity    Alcohol use: Yes     Alcohol/week: 1.0 standard drink     Types: 1 Glasses of wine per week     Comment: maybe 1--2 monthly    Drug use: No    Sexual activity: Not Currently     Comment:    Social History Narrative    She exercises regularly.     Past  Surgical History:   Procedure Laterality Date    COLONOSCOPY N/A 8/25/2017    Procedure: COLONOSCOPY;  Surgeon: Keny Lui MD;  Location: [facility] ([address] [place] [address]);  Service: Endoscopy;  Laterality: N/A;    left 5th toe surgery      rt ear surgery      rt lung lobectomy and a rib removed      secondary to TB    TUBAL LIGATION       Family History   Problem Relation Age of Onset    Heart disease Mother         cabg    Kidney disease Mother         was on dialysis    Stroke Father     Cancer Brother         lung cancer    No Known Problems Son     Breast cancer Cousin         paternal FIRST cousin    Ovarian cancer Neg Hx     Esophageal cancer Neg Hx     Colon cancer Neg Hx            Review of Systems  General: negative for chills, fever or weight loss  Psychological: negative for mood changes or depression  Ophthalmic: negative for blurry vision, photophobia or eye pain  ENT: see HPI  Respiratory: no cough, shortness of breath, or wheezing  Cardiovascular: no chest pain or dyspnea on exertion  Gastrointestinal: no abdominal pain, change in bowel habits, or black/ bloody stools  Musculoskeletal: negative for gait disturbance or muscular weakness  Neurological: no syncope or seizures; no ataxia  Dermatological: negative for pruritis,  rash and jaundice  Hematologic/lymphatic: no easy bruising, no new adenopathy      Physical Exam:    Vitals:    04/04/23 1331   BP: (!) 178/80   Pulse: 84       Constitutional: Well appearing / communicating without difficutly.  NAD.  Eyes: EOM I Bilaterally  Head/Face: Normocephalic.  Negative paranasal sinus pressure/tenderness.  Salivary glands WNL.  House Brackmann I Bilaterally.    Right Ear: Auricle normal appearance. External Auditory Canal within normal limits no lesions or masses, 90% TM perforation at the anterior quadrants.  Left Ear: Auricle normal appearance. External Auditory Canal within normal limits no lesions or masses,TM w/o masses/lesions/perforations. TM mobility  noted.  Nose: No gross nasal septal deviation. Inferior Turbinates 3+ bilaterally. No polyp/polypoid nasal mucosa noted. No septal perforation. No masses/lesions. External nasal skin appears normal without masses/lesions.  Oral Cavity: Gingiva/lips within normal limits.  Dentition/gingiva healthy appearing. Mucus membranes moist. Floor of mouth soft, no masses palpated. Oral Tongue mobile. Hard Palate appears normal.    Oropharynx: Base of tongue appears normal. No masses/lesions noted. Tonsillar fossa/pharyngeal wall without lesions. Posterior oropharynx WNL.  Soft palate without masses. Midline uvula.   Neck/Lymphatic: No LAD I-VI bilaterally.  No thyromegaly.  No masses noted on exam.    Mirror laryngoscopy/nasopharyngoscopy: Active gag reflex.  Unable to perform.    Neuro/Psychiatric: AOx3.  Normal mood and affect.   Cardiovascular: Normal carotid pulses bilaterally, no increasing jugular venous distention noted at cervical region bilaterally.    Respiratory: Normal respiratory effort, no stridor, no retractions noted.      Procedures:    Ear Cerumen Removal    Date/Time: 4/4/2023 1:40 PM  Performed by: Page Barajas NP  Authorized by: Page Barajas NP     Consent Done?:  Yes (Verbal)  Location details:  Left ear  Procedure type: curette    Procedure type comment:  Suction  Cerumen  Removal Results:  Cerumen completely removed  Patient tolerance:  Patient tolerated the procedure well with no immediate complications      Audiogram interpreted personally by me and discussed in detail with the patient today.   Pure tone testing revealed severe to profound mixed hearing loss in the right ear and normal sloping to moderate sensorineural hearing loss in the left ear. Speech reception thresholds were obtained at 90 dBHL in the right ear and 25 dBHL in the left ear. Speech discrimination could not be obtained in the right ear and a score of 100% was obtained in the left ear. Tympanometry revealed Type B  tympanogram with ECV of 2.11ml in the right ear and Type As tympanogram in the left ear.        Assessment:    ICD-10-CM ICD-9-CM    1. Chronic allergic rhinitis  J30.9 477.9 Ambulatory referral/consult to ENT      2. Pulsatile tinnitus, left ear  H93.A9 388.30 MRI IAC/Temporal Bones W W/O Contrast      3. Impacted cerumen of left ear  H61.22 380.4 Ear Cerumen Removal      4. Asymmetrical hearing loss  H91.8X9 389.9       5. Mixed conductive and sensorineural hearing loss of right ear with restricted hearing of left ear  H90.A31 389.22         The primary encounter diagnosis was Chronic allergic rhinitis. Diagnoses of Pulsatile tinnitus, left ear, Impacted cerumen of left ear, Asymmetrical hearing loss, and Mixed conductive and sensorineural hearing loss of right ear with restricted hearing of left ear were also pertinent to this visit.      Plan:    -ordered MRI IAC/temp to rule out retrocochlear pathologies  -start on Flonase 2 sprays in each nostril daily  -start on Azelastine 1 spray in each nostril bid  - continue on Allegra 180 mg daily    We reviewed the patient's recent audiogram and hearing loss in detail.  We also discussed the use hearing protection when exposed to loud noise, including lawn equipment. She is interested in getting hearing aid but she does not want anything surgical (BAHA).       Page Barajas NP

## 2023-04-04 NOTE — PROGRESS NOTES
"Elizabeth Gleason, a 77 y.o. female was seen today in the clinic for an audiologic evaluation. The patient's primary complaint was reduced auditory perception.  Ms. Gleason has a history of "mastoid surgery" on the right ear at age 23. She reports increased difficulty hearing in the left ear. Patient does experience tinnitus in both ears and occasionally feels off balanced, which she associates with the condition of the right ear. No history of noise exposure or family history of hearing loss was reported.    Pure tone testing revealed severe to profound mixed hearing loss in the right ear and normal sloping to moderate sensorineural hearing loss in the left ear. Speech reception thresholds were obtained at 90 dBHL in the right ear and 25 dBHL in the left ear. Speech discrimination could not be obtained in the right ear and a score of 100% was obtained in the left ear. Tympanometry revealed Type B tympanogram with ECV of 2.11ml in the right ear and Type As tympanogram in the left ear.    Results were reviewed with the patient and the recommendation of a hearing aid consultation was discussed.    Recommendations:  Otologic evaluation  Annual audiologic evaluation  Hearing protection in noise  Hearing aid consultation              "

## 2023-04-04 NOTE — PATIENT INSTRUCTIONS
How do you use a Nasal Corticosteroid Spray?    Make sure you understand your dosing instructions. Spray only the number of prescribed sprays in each nostril. Read the package instructions before using your spray the first time.    Most corticosteroid sprays suggest the following steps:    Wash your hands well.    Gently blow your nose to clear the passageway.    Shake the container several times.    Tilt your head slightly downward.  Use the opposite hand from the nostril you will be spraying to hold the spray bottle.    Block one nostril with your finger.  Insert the nasal applicator into the other nostril.    Aim the spray toward the outer wall of the nostril.  Inhale slowly through the nose and press the .    Breathe out and repeat to apply the prescribed number of sprays.  Repeat these steps for the other nostril.     Avoid sneezing or blowing your nose right after spraying.

## 2023-04-05 ENCOUNTER — TELEPHONE (OUTPATIENT)
Dept: OTOLARYNGOLOGY | Facility: CLINIC | Age: 78
End: 2023-04-05
Payer: MEDICARE

## 2023-04-05 NOTE — TELEPHONE ENCOUNTER
Returned call. Pt explained that she has anxiety when it comes to the closed space of the MRI. She would like to know if a valium can be called in for her to take for the day of her scheduled MRI. Informed pt I would send her message to Ms. Barajas. Pt thanked me and verbalized understanding.     ----- Message from Reji Mckeon sent at 4/5/2023  1:40 PM CDT -----  Contact: pt  Type: Requesting to speak with nurse    Who Called: PT  Regarding: MRI IAC/TEMP W/WO CONTRAST [[id number]]  Would the patient rather a call back or a response via [facility]? Call back  Best Call Back Number: [phone]  Additional Information:

## 2023-04-05 NOTE — TELEPHONE ENCOUNTER
Follow up call with patient after speaking to lead audiologist regarding referral for hearing aid consultation. Discussed waiting on results of MRI which were ordered by nurse practitioner Page Barajas. Will follow up with patient after results of imaging in order to make the best referral for HAC appointment. Ms. Gleason was appreciative of the call.

## 2023-04-06 RX ORDER — DIAZEPAM 5 MG/1
5 TABLET ORAL EVERY 6 HOURS PRN
Qty: 2 TABLET | Refills: 0 | Status: SHIPPED | OUTPATIENT
Start: 2023-04-06 | End: 2023-05-06

## 2023-04-10 ENCOUNTER — TELEPHONE (OUTPATIENT)
Dept: OTOLARYNGOLOGY | Facility: CLINIC | Age: 78
End: 2023-04-10
Payer: MEDICARE

## 2023-04-10 NOTE — TELEPHONE ENCOUNTER
Returned call. Informed pt that the Valium was sent into her pharmacy. Pt thanked me and verbalized understanding.     ----- Message from Reji Mckeon sent at 4/10/2023 12:18 PM CDT -----  Contact: pt  Type: Requesting to speak with nurse        Who Called: PT  Regarding: diazePAM (VALIUM) 5 MG tablet. Needed for MRI   Would the patient rather a call back or a response via [facility]? Call back  Best Call Back Number: [phone]  pharmacy Information:  [facility] #[id number] [address] AT [facility] [place] [address]   Phone: [phone]  Fax:  [phone]

## 2023-04-11 ENCOUNTER — TELEPHONE (OUTPATIENT)
Dept: OTOLARYNGOLOGY | Facility: CLINIC | Age: 78
End: 2023-04-11
Payer: MEDICARE

## 2023-04-11 NOTE — TELEPHONE ENCOUNTER
----- Message from Evelyn Carter sent at 4/11/2023 12:50 PM CDT -----  .Type:  RX Refill Request    Who Called: pt  Refill or New Rx:refill  RX Name and Strength:diazePAM (VALIUM) 5 MG tablet  How is the patient currently taking it? (ex. 1XDay):Take 1 tablet (5 mg total) by mouth every 6 (six) hours as needed for Anxiety  Is this a 30 day or 90 day RX:2 tablets  Preferred Pharmacy with phone number:[facility] in [place]  Local or Mail Order:local  Ordering Provider:Karl  Would the patient rather a call back or a response via [facility]? Call back  Best Call Back Number:[phone]  Additional Information:

## 2023-04-12 ENCOUNTER — TELEPHONE (OUTPATIENT)
Dept: OTOLARYNGOLOGY | Facility: CLINIC | Age: 78
End: 2023-04-12
Payer: MEDICARE

## 2023-04-12 NOTE — TELEPHONE ENCOUNTER
Attempted to return call. No answer. Voicemail was left explaining that the request for the change of the pharmacy was sent to Ms. Barajas. She is out of office, advised I would call her once the request is approved.     ----- Message from Viki Samuels sent at 4/12/2023 10:06 AM CDT -----  Type:  Needs Medical Advice    Who Called:  pt   Symptoms (please be specific):  calling to follow up on medication being sent to local pharmacy    diazePAM (VALIUM) 5 MG tablet    Pharmacy name and phone #:  [id number] - [facility] [address] AT [facility] [address]   Phone: [phone]  Fax:  [phone]    Would the patient rather a call back or a response via [facility]? Call to confirm once script sent   Best Call Back Number:  [phone]  Additional Information:

## 2023-04-21 ENCOUNTER — TELEPHONE (OUTPATIENT)
Dept: OTOLARYNGOLOGY | Facility: CLINIC | Age: 78
End: 2023-04-21
Payer: MEDICARE

## 2023-04-21 NOTE — TELEPHONE ENCOUNTER
Called pt per Page Barajas wit MRI results. Pt verbalized the nature of the phone call. Pt was also given Ludy Paulino's contact information to discuss hearing aid options.    ----- Message from Page Barajas NP sent at 4/21/2023  2:29 PM CDT -----  Please let her know that her MRI results are normal. No changes in her current treatment.

## 2023-04-25 ENCOUNTER — TELEPHONE (OUTPATIENT)
Dept: FAMILY MEDICINE | Facility: CLINIC | Age: 78
End: 2023-04-25
Payer: MEDICARE

## 2023-04-25 RX ORDER — LANCETS
EACH MISCELLANEOUS
Qty: 100 EACH | Refills: 2 | Status: SHIPPED | OUTPATIENT
Start: 2023-04-25

## 2023-04-25 RX ORDER — INSULIN PUMP SYRINGE, 3 ML
EACH MISCELLANEOUS
Qty: 1 EACH | Refills: 0 | Status: SHIPPED | OUTPATIENT
Start: 2023-04-25 | End: 2024-04-24

## 2023-04-25 NOTE — TELEPHONE ENCOUNTER
Pt states that she had a glucose meter [facility] received years ago from other dr but is no longer working. Pt would like an order for a new one placed to [facility]. Please place order.

## 2023-04-25 NOTE — TELEPHONE ENCOUNTER
----- Message from Viki Samuels sent at 4/25/2023 11:48 AM CDT -----  Type:  Needs Medical Advice    Who Called:  pt  Symptoms (please be specific):  calling to request an order for new Glucose meter    To be sent to [facility]   Would the patient rather a call back or a response via [facility]?  Call   Best Call Back Number:   [phone]  Additional Information:  attn malina Smith at [facility]

## 2023-05-03 ENCOUNTER — PES CALL (OUTPATIENT)
Dept: ADMINISTRATIVE | Facility: CLINIC | Age: 78
End: 2023-05-03
Payer: MEDICARE

## 2023-05-23 DIAGNOSIS — U07.1 COVID-19 VIRUS DETECTED: ICD-10-CM

## 2023-05-26 PROBLEM — R53.1 GENERALIZED WEAKNESS: Status: ACTIVE | Noted: 2023-05-26

## 2023-05-26 PROBLEM — U07.1 COVID-19 VIRUS INFECTION: Status: ACTIVE | Noted: 2023-05-26

## 2023-05-26 PROBLEM — E87.6 HYPOKALEMIA: Status: ACTIVE | Noted: 2023-05-26

## 2023-05-26 PROBLEM — R33.9 URINARY RETENTION: Status: ACTIVE | Noted: 2023-05-26

## 2023-05-26 PROBLEM — E86.0 DEHYDRATION: Status: ACTIVE | Noted: 2023-05-26

## 2023-05-27 PROBLEM — E87.6 HYPOKALEMIA: Status: RESOLVED | Noted: 2023-05-26 | Resolved: 2023-05-27

## 2023-05-29 PROBLEM — E86.0 DEHYDRATION: Status: RESOLVED | Noted: 2023-05-26 | Resolved: 2023-05-29

## 2023-05-30 ENCOUNTER — TELEPHONE (OUTPATIENT)
Dept: FAMILY MEDICINE | Facility: CLINIC | Age: 78
End: 2023-05-30
Payer: MEDICARE

## 2023-05-30 NOTE — TELEPHONE ENCOUNTER
Pt was dx with covid on 05/23/2023. Pt still experiencing symptoms of covid. Pt was discharged from the hospital.      Pt called this morning stating that she has a uti. Complains of having pain when urinating, urine cloudiness, and urinary frequency. Pt requesting to have medication sent in to the pharmacy. Please advise.

## 2023-05-30 NOTE — TELEPHONE ENCOUNTER
Spoke with pt to inform her that we need to have urine sample in order to prescribe medication for UTI; her urine was clean when she was at the hospital 3 days ago. Pt stated that she has an appt on 6/5/2023 to come in the see Dr. Warren.

## 2023-05-30 NOTE — TELEPHONE ENCOUNTER
----- Message from eSrenity Jaimes sent at 5/30/2023  7:59 AM CDT -----  Type:  Needs Medical Advice    Who Called: patient  Would the patient rather a call back or a response via [facility]? call  Best Call Back Number: [phone]  Additional Information: She was just discharged from the hospital and has a uti; she is hoping to get something called in.

## 2023-05-30 NOTE — TELEPHONE ENCOUNTER
We need to have urine sample in order to prescribe medication for UTI; her urine was clean when she was at the hospital 3 days ago

## 2023-05-31 ENCOUNTER — TELEPHONE (OUTPATIENT)
Dept: FAMILY MEDICINE | Facility: CLINIC | Age: 78
End: 2023-05-31
Payer: MEDICARE

## 2023-05-31 PROCEDURE — G0180 MD CERTIFICATION HHA PATIENT: HCPCS | Mod: ,,, | Performed by: INTERNAL MEDICINE

## 2023-05-31 PROCEDURE — G0180 PR HOME HEALTH MD CERTIFICATION: ICD-10-PCS | Mod: ,,, | Performed by: INTERNAL MEDICINE

## 2023-05-31 NOTE — TELEPHONE ENCOUNTER
----- Message from Guillermina Beckroe sent at 5/30/2023 11:38 AM CDT -----  Type:  Needs Medical Advice    Who Called:  pt  Symptoms (please be specific):  bladder infection   How long has patient had these symptoms:    Pharmacy name and phone #:    [facility][id number] - [place], [place] - [address] AT [facility] [address]   Phone:  [phone]  Fax:  [phone]  Would the patient rather a call back or a response via [facility]?   Best Call Back Number: [phone] (M)   Additional Information: pt is f/u from msg sent  2 1/2 hrs ago

## 2023-06-05 ENCOUNTER — OFFICE VISIT (OUTPATIENT)
Dept: FAMILY MEDICINE | Facility: CLINIC | Age: 78
End: 2023-06-05
Payer: MEDICARE

## 2023-06-05 VITALS
HEART RATE: 71 BPM | TEMPERATURE: 98 F | HEIGHT: 65 IN | DIASTOLIC BLOOD PRESSURE: 72 MMHG | SYSTOLIC BLOOD PRESSURE: 128 MMHG | WEIGHT: 154.19 LBS | OXYGEN SATURATION: 97 % | BODY MASS INDEX: 25.69 KG/M2

## 2023-06-05 DIAGNOSIS — F41.1 GENERALIZED ANXIETY DISORDER: ICD-10-CM

## 2023-06-05 DIAGNOSIS — U07.1 COVID-19 VIRUS INFECTION: Primary | ICD-10-CM

## 2023-06-05 DIAGNOSIS — E03.9 ACQUIRED HYPOTHYROIDISM: ICD-10-CM

## 2023-06-05 DIAGNOSIS — I10 PRIMARY HYPERTENSION: ICD-10-CM

## 2023-06-05 DIAGNOSIS — E78.2 MIXED HYPERLIPIDEMIA: ICD-10-CM

## 2023-06-05 PROBLEM — R53.1 GENERALIZED WEAKNESS: Status: RESOLVED | Noted: 2023-05-26 | Resolved: 2023-06-05

## 2023-06-05 PROCEDURE — 99215 OFFICE O/P EST HI 40 MIN: CPT | Mod: S$PBB,,, | Performed by: STUDENT IN AN ORGANIZED HEALTH CARE EDUCATION/TRAINING PROGRAM

## 2023-06-05 PROCEDURE — 99999 PR PBB SHADOW E&M-EST. PATIENT-LVL IV: CPT | Mod: PBBFAC,,, | Performed by: STUDENT IN AN ORGANIZED HEALTH CARE EDUCATION/TRAINING PROGRAM

## 2023-06-05 PROCEDURE — 99214 OFFICE O/P EST MOD 30 MIN: CPT | Mod: PBBFAC,PN | Performed by: STUDENT IN AN ORGANIZED HEALTH CARE EDUCATION/TRAINING PROGRAM

## 2023-06-05 PROCEDURE — 99999 PR PBB SHADOW E&M-EST. PATIENT-LVL IV: ICD-10-PCS | Mod: PBBFAC,,, | Performed by: STUDENT IN AN ORGANIZED HEALTH CARE EDUCATION/TRAINING PROGRAM

## 2023-06-05 PROCEDURE — 99215 PR OFFICE/OUTPT VISIT, EST, LEVL V, 40-54 MIN: ICD-10-PCS | Mod: S$PBB,,, | Performed by: STUDENT IN AN ORGANIZED HEALTH CARE EDUCATION/TRAINING PROGRAM

## 2023-06-05 RX ORDER — LEVOTHYROXINE SODIUM 88 UG/1
88 TABLET ORAL DAILY
Qty: 90 TABLET | Refills: 3 | Status: CANCELLED | OUTPATIENT
Start: 2023-06-05

## 2023-06-05 RX ORDER — LOSARTAN POTASSIUM AND HYDROCHLOROTHIAZIDE 12.5; 1 MG/1; MG/1
1 TABLET ORAL DAILY
Qty: 90 TABLET | Refills: 3 | Status: CANCELLED | OUTPATIENT
Start: 2023-06-05 | End: 2024-06-04

## 2023-06-05 RX ORDER — DIAZEPAM 5 MG/1
5 TABLET ORAL DAILY PRN
Qty: 20 TABLET | Refills: 0 | Status: SHIPPED | OUTPATIENT
Start: 2023-06-05 | End: 2023-09-21 | Stop reason: SDUPTHER

## 2023-06-05 RX ORDER — PITAVASTATIN CALCIUM 1.04 MG/1
1 TABLET, FILM COATED ORAL NIGHTLY
Qty: 90 TABLET | Refills: 3 | Status: CANCELLED | OUTPATIENT
Start: 2023-06-05 | End: 2024-06-04

## 2023-06-05 RX ORDER — ESCITALOPRAM OXALATE 20 MG/1
20 TABLET ORAL NIGHTLY
Qty: 90 TABLET | Refills: 3 | Status: SHIPPED | OUTPATIENT
Start: 2023-06-05

## 2023-06-05 RX ORDER — LOSARTAN POTASSIUM AND HYDROCHLOROTHIAZIDE 12.5; 1 MG/1; MG/1
1 TABLET ORAL DAILY
Qty: 90 TABLET | Refills: 3 | Status: SHIPPED | OUTPATIENT
Start: 2023-06-05 | End: 2024-06-04

## 2023-06-05 RX ORDER — PHENAZOPYRIDINE HYDROCHLORIDE 100 MG/1
100 TABLET, FILM COATED ORAL 3 TIMES DAILY PRN
Qty: 20 TABLET | Refills: 1 | Status: SHIPPED | OUTPATIENT
Start: 2023-06-05 | End: 2024-03-27 | Stop reason: SDUPTHER

## 2023-06-05 RX ORDER — DIAZEPAM 5 MG/1
5 TABLET ORAL DAILY PRN
Qty: 20 TABLET | Refills: 0 | Status: CANCELLED | OUTPATIENT
Start: 2023-06-05

## 2023-06-05 RX ORDER — PITAVASTATIN CALCIUM 1.04 MG/1
1 TABLET, FILM COATED ORAL NIGHTLY
Qty: 90 TABLET | Refills: 3 | Status: SHIPPED | OUTPATIENT
Start: 2023-06-05 | End: 2024-06-04

## 2023-06-05 RX ORDER — ESCITALOPRAM OXALATE 20 MG/1
20 TABLET ORAL NIGHTLY
Qty: 90 TABLET | Refills: 3 | Status: CANCELLED | OUTPATIENT
Start: 2023-06-05

## 2023-06-05 RX ORDER — PHENAZOPYRIDINE HYDROCHLORIDE 100 MG/1
100 TABLET, FILM COATED ORAL 3 TIMES DAILY PRN
Qty: 20 TABLET | Refills: 1 | Status: CANCELLED | OUTPATIENT
Start: 2023-06-05

## 2023-06-05 NOTE — PROGRESS NOTES
Subjective:       Patient ID: Elizabeth Gleason is a 77 y.o. female.    Chief Complaint: Follow-up (Pt here for a 3 month follow up , pt recently had COVID on 5/23/2023)      Active Problem List with Overview Notes    Diagnosis Date Noted    COVID-19 virus infection 05/26/2023     Recovered  Needs repeat labs to make sure electrolyte issues are improving since getting out hospital   HH for PT/OT; strengthening       Urinary retention 05/26/2023    Spastic colon 09/08/2022     Lomotil PRN   Follows with ANTOINETTE; Yuriy; annually       Statin intolerance 03/08/2021     Follows with cards  Tolerates pitavastatin 1mg QHS       Aortic atherosclerosis 01/10/2020     Asa and statin       Mixed hyperlipidemia 03/01/2018     Controlled  pitavastatin 1mg QHS   Tolerates   Has hx of statin intolerance in past; follows with cards       Anxiety disorder 03/01/2018     lexapro 20   Valium PRN; less than weekly  1 RX lasts minimum 6 months;  review confirms   She has been having more anxiety lately with tremors, restlessness, hyperactive; has taken all 20 pills in last 6 months valium       Acquired hypothyroidism 11/16/2017     Synthroid 88  Asymptomatic   TSH WNL      Tension type headache 06/10/2016    Hypertension      Well controlled  Losartan/HCTZ 100/12.5  Asymptomatic         Impaired fasting glucose      Diet controlled  A1C 5.6; elevated fasting glucose       Osteopenia      Ca/Vit D   strength training       Bilateral carotid artery stenosis      Follows with cards  Statin and asa          Review of Systems   Musculoskeletal:  Positive for arthralgias and back pain.   All other systems reviewed and are negative.     A1C:  Recent Labs   Lab 03/04/22  0903 09/08/22  1159 03/10/23  0732   Hemoglobin A1C 5.4 5.4 5.6     CBC:  Recent Labs   Lab 09/08/22  1159 03/10/23  0732 05/26/23  0902   WBC 8.16 7.24 5.93   RBC 4.21 4.28 4.48   Hemoglobin 12.6 12.7 13.7   Hematocrit 38.7 38.5 39.3   Platelets 380 336 273   MCV 92 90  88   MCH 29.9 29.7 30.6   MCHC 32.6 33.0 34.9     CMP:  Recent Labs   Lab 09/08/22  1159 03/10/23  0732 05/26/23  0902 05/27/23  0550 05/29/23  0624   Glucose 109 110 111 H   < > 92   Calcium 9.4 9.2 8.6 L   < > 7.7 L   Albumin 4.7 4.6 4.3  --   --    Total Protein 7.9 7.3 7.5  --   --    Sodium 141 143 140   < > 140   Potassium 4.4 4.0 3.1 L   < > 3.9   CO2 31 H 31 H 28   < > 26   Chloride 102 104 104   < > 113 H   BUN 18 H 15 15   < > 5 L   Creatinine 0.72 0.58 0.95   < > 0.59   Alkaline Phosphatase 92 80 80  --   --    ALT 26 26 33  --   --    AST 31 31 44  --   --    Total Bilirubin 0.5 0.3 0.3  --   --     < > = values in this interval not displayed.     LIPIDS:  Recent Labs   Lab 10/08/21  0944 10/08/21  0945 03/04/22  0903 09/08/22  1159 03/10/23  0732 05/26/23  0902   TSH  --    < > 0.720   < > 0.518 0.958   HDL 38 L  --  39 L  --  39 L  --    Cholesterol 224 H  --  219 H  --  189  --    Triglycerides 273 H  --  316 H  --  246 H  --    LDL Cholesterol 131.4  --  116.8  --  100.8  --    HDL/Cholesterol Ratio 17.0 L  --  17.8 L  --  20.6  --    Non-HDL Cholesterol 186  --  180  --  150  --    Total Cholesterol/HDL Ratio 5.9 H  --  5.6 H  --  4.8  --     < > = values in this interval not displayed.     TSH:  Recent Labs   Lab 09/08/22  1159 03/10/23  0732 05/26/23  0902   TSH 0.383 L 0.518 0.958        Objective:      Vitals:    06/05/23 1453   BP: 128/72   Pulse: 71   Temp: 98.1 °F (36.7 °C)      Physical Exam  Vitals reviewed.   Constitutional:       Appearance: Normal appearance. She is normal weight.   HENT:      Head: Normocephalic and atraumatic.   Eyes:      Conjunctiva/sclera: Conjunctivae normal.   Cardiovascular:      Rate and Rhythm: Normal rate and regular rhythm.      Heart sounds: Normal heart sounds.   Pulmonary:      Effort: Pulmonary effort is normal.      Breath sounds: Normal breath sounds.   Abdominal:      Palpations: Abdomen is soft.      Tenderness: There is no abdominal tenderness.    Musculoskeletal:         General: Normal range of motion.      Cervical back: Normal range of motion.      Right lower leg: No edema.      Left lower leg: No edema.   Neurological:      General: No focal deficit present.      Mental Status: She is alert and oriented to person, place, and time.   Psychiatric:         Mood and Affect: Mood normal.         Behavior: Behavior normal.        Assessment:       1. COVID-19 virus infection    2. Generalized anxiety disorder    3. Primary hypertension    4. Mixed hyperlipidemia    5. Acquired hypothyroidism        Plan:   1. COVID-19 virus infection    2. Generalized anxiety disorder  - diazePAM (VALIUM) 5 MG tablet; Take 1 tablet (5 mg total) by mouth daily as needed for Anxiety.  Dispense: 20 tablet; Refill: 0  - EScitalopram oxalate (LEXAPRO) 20 MG tablet; Take 1 tablet (20 mg total) by mouth every evening.  Dispense: 90 tablet; Refill: 3    3. Primary hypertension  - losartan-hydrochlorothiazide 100-12.5 mg (HYZAAR) 100-12.5 mg Tab; Take 1 tablet by mouth once daily.  Dispense: 90 tablet; Refill: 3    4. Mixed hyperlipidemia  - pitavastatin calcium (LIVALO) 1 mg Tab tablet; Take 1 tablet (1 mg total) by mouth every evening.  Dispense: 90 tablet; Refill: 3    5. Acquired hypothyroidism     Labs reviewed in detail; repeat per orders  Continue healthy lifestyle efforts  Continue current meds as prescribed otherwise; refills per request  Keep routine specialist f/u   RTC in 3 months  with labs prior and/or PRN          Selena Warren   [facility]   6/5/23     I spent a total of >40 minutes on the day of the visit.This includes face to face time and non-face to face time preparing to see the patient (eg, review of tests), obtaining and/or reviewing separately obtained history, documenting clinical information in the electronic or other health record, independently interpreting results and communicating results to the patient/family/caregiver, or care  coordinator.

## 2023-06-09 ENCOUNTER — EXTERNAL HOME HEALTH (OUTPATIENT)
Dept: HOME HEALTH SERVICES | Facility: HOSPITAL | Age: 78
End: 2023-06-09
Payer: MEDICARE

## 2023-06-14 RX ORDER — DIPHENOXYLATE HYDROCHLORIDE AND ATROPINE SULFATE 2.5; .025 MG/1; MG/1
1 TABLET ORAL 4 TIMES DAILY PRN
Qty: 120 TABLET | Refills: 3 | Status: SHIPPED | OUTPATIENT
Start: 2023-06-14 | End: 2023-10-18 | Stop reason: SDUPTHER

## 2023-08-04 ENCOUNTER — TELEPHONE (OUTPATIENT)
Dept: FAMILY MEDICINE | Facility: CLINIC | Age: 78
End: 2023-08-04
Payer: MEDICARE

## 2023-08-04 DIAGNOSIS — R30.0 DYSURIA: Primary | ICD-10-CM

## 2023-08-04 NOTE — TELEPHONE ENCOUNTER
Called and spoke with pt in regards of her message. Pt states that she has been having lower back pain for the last two weeks, but that she has been drinking a lot of water and the pain has somewhat gone away. Pt wants to know can she please get her urine checked. Please advise patient message.

## 2023-08-04 NOTE — TELEPHONE ENCOUNTER
----- Message from Guillermina Sanchez sent at 8/4/2023  2:08 PM CDT -----  Type:  Needs Medical Advice    Who Called:  pt  Symptoms (please be specific): back pain  How long has patient had these symptoms:    Pharmacy name and phone #:    Would the patient rather a call back or a response via [facility]?   Best Call Back Number: [phone]  Additional Information: pt wants to speak to the office about getting a urine culture or xray

## 2023-08-08 ENCOUNTER — OFFICE VISIT (OUTPATIENT)
Dept: FAMILY MEDICINE | Facility: CLINIC | Age: 78
End: 2023-08-08
Payer: MEDICARE

## 2023-08-08 VITALS
DIASTOLIC BLOOD PRESSURE: 68 MMHG | OXYGEN SATURATION: 97 % | HEIGHT: 65 IN | TEMPERATURE: 98 F | BODY MASS INDEX: 26.06 KG/M2 | SYSTOLIC BLOOD PRESSURE: 144 MMHG | WEIGHT: 156.44 LBS | HEART RATE: 66 BPM

## 2023-08-08 DIAGNOSIS — M54.50 LOW BACK PAIN, UNSPECIFIED BACK PAIN LATERALITY, UNSPECIFIED CHRONICITY, UNSPECIFIED WHETHER SCIATICA PRESENT: Primary | ICD-10-CM

## 2023-08-08 DIAGNOSIS — Z13.0 SCREENING FOR BLOOD DISEASE: ICD-10-CM

## 2023-08-08 DIAGNOSIS — R73.01 IMPAIRED FASTING GLUCOSE: ICD-10-CM

## 2023-08-08 DIAGNOSIS — I10 PRIMARY HYPERTENSION: ICD-10-CM

## 2023-08-08 DIAGNOSIS — E78.2 MIXED HYPERLIPIDEMIA: ICD-10-CM

## 2023-08-08 DIAGNOSIS — Z13.29 SCREENING FOR THYROID DISORDER: ICD-10-CM

## 2023-08-08 PROCEDURE — 99999 PR PBB SHADOW E&M-EST. PATIENT-LVL IV: ICD-10-PCS | Mod: PBBFAC,,, | Performed by: PEDIATRICS

## 2023-08-08 PROCEDURE — 99214 PR OFFICE/OUTPT VISIT, EST, LEVL IV, 30-39 MIN: ICD-10-PCS | Mod: S$PBB,,, | Performed by: PEDIATRICS

## 2023-08-08 PROCEDURE — 99214 OFFICE O/P EST MOD 30 MIN: CPT | Mod: S$PBB,,, | Performed by: PEDIATRICS

## 2023-08-08 PROCEDURE — 99214 OFFICE O/P EST MOD 30 MIN: CPT | Mod: PBBFAC,PN | Performed by: PEDIATRICS

## 2023-08-08 PROCEDURE — 99999 PR PBB SHADOW E&M-EST. PATIENT-LVL IV: CPT | Mod: PBBFAC,,, | Performed by: PEDIATRICS

## 2023-08-08 RX ORDER — MELOXICAM 7.5 MG/1
7.5 TABLET ORAL DAILY
Qty: 30 TABLET | Refills: 0 | Status: SHIPPED | OUTPATIENT
Start: 2023-08-08 | End: 2023-09-05

## 2023-08-08 NOTE — PROGRESS NOTES
Subjective:      Patient ID: Elizabeth Gleason is a 77 y.o. female.    Chief Complaint: Results (Discuss urine results, )    Here today for follow up from urine. Reports lower back pain and some pressure in bladder. Has been drinking lots of water and cranberry, which has helped with bladder pain a lot. Reports now lower back is still sore, more toward the left side.      Review of Systems   Constitutional:  Negative for chills, fatigue and fever.   HENT:  Negative for congestion, ear pain, postnasal drip, rhinorrhea, sinus pressure, sinus pain, sneezing and sore throat.    Respiratory:  Negative for cough, chest tightness, shortness of breath and wheezing.    Cardiovascular:  Negative for chest pain and palpitations.   Gastrointestinal:  Negative for diarrhea, nausea and vomiting.   Genitourinary:  Negative for difficulty urinating.   Musculoskeletal:  Positive for back pain. Negative for arthralgias.   Skin:  Negative for rash.   Neurological:  Negative for dizziness and headaches.   Psychiatric/Behavioral:  Negative for confusion.         Current Outpatient Medications on File Prior to Visit   Medication Sig    aspirin (ECOTRIN) 81 MG EC tablet Take 81 mg by mouth once daily.    azelastine (ASTELIN) 137 mcg (0.1 %) nasal spray 1 spray (137 mcg total) by Nasal route 2 (two) times daily. (Patient taking differently: 1 spray by Nasal route 2 (two) times daily as needed.)    blood sugar diagnostic Strp To check BG 1 times daily, to use with insurance preferred meter    blood-glucose meter kit To check BG 1 times daily, to use with insurance preferred meter    diazePAM (VALIUM) 5 MG tablet Take 1 tablet (5 mg total) by mouth daily as needed for Anxiety.    diphenoxylate-atropine 2.5-0.025 mg (LOMOTIL) 2.5-0.025 mg per tablet Take 1 tablet by mouth 4 (four) times daily as needed for Diarrhea.    EScitalopram oxalate (LEXAPRO) 20 MG tablet Take 1 tablet (20 mg total) by mouth every evening.    ibuprofen  (ADVIL,MOTRIN) 200 MG tablet Take 200 mg by mouth as needed for Pain.    lancets Misc To check BG 1 times daily, to use with insurance preferred meter    levothyroxine (SYNTHROID) 88 MCG tablet Take 1 tablet (88 mcg total) by mouth once daily.    losartan-hydrochlorothiazide 100-12.5 mg (HYZAAR) 100-12.5 mg Tab Take 1 tablet by mouth once daily.    multivit-min-iron-FA-lutein 8 mg iron-400 mcg-300 mcg Tab Take 1 tablet by mouth once daily.    omega-3 fatty acids/fish oil (FISH OIL-OMEGA-3 FATTY ACIDS) 300-1,000 mg capsule Take 1 capsule by mouth once daily.    phenazopyridine (PYRIDIUM) 100 MG tablet Take 1 tablet (100 mg total) by mouth 3 (three) times daily as needed for Pain (bladder).    pitavastatin calcium (LIVALO) 1 mg Tab tablet Take 1 tablet (1 mg total) by mouth every evening.     No current facility-administered medications on file prior to visit.        Objective:     Vitals:    08/08/23 1015   BP: (!) 144/68   Pulse:    Temp:       Physical Exam  Constitutional:       General: She is not in acute distress.     Appearance: Normal appearance.   HENT:      Head: Normocephalic and atraumatic.      Right Ear: External ear normal.      Left Ear: External ear normal.      Nose: Nose normal.      Mouth/Throat:      Mouth: Mucous membranes are moist.      Pharynx: Oropharynx is clear.   Eyes:      Extraocular Movements: Extraocular movements intact.      Conjunctiva/sclera: Conjunctivae normal.      Pupils: Pupils are equal, round, and reactive to light.   Cardiovascular:      Rate and Rhythm: Normal rate and regular rhythm.      Pulses: Normal pulses.      Heart sounds: Normal heart sounds.   Pulmonary:      Effort: Pulmonary effort is normal. No respiratory distress.      Breath sounds: Normal breath sounds. No wheezing.   Abdominal:      General: Abdomen is flat.   Musculoskeletal:         General: No swelling. Normal range of motion.      Cervical back: Normal range of motion and neck supple.         Back:       Comments: Pain w/ palpation 4/10.   Skin:     General: Skin is warm and dry.      Findings: No rash.   Neurological:      Mental Status: She is alert and oriented to person, place, and time.      Gait: Gait normal.   Psychiatric:         Mood and Affect: Mood normal.         Behavior: Behavior normal.        Assessment:         1. Low back pain, unspecified back pain laterality, unspecified chronicity, unspecified whether sciatica present    2. Primary hypertension    3. Mixed hyperlipidemia    4. Impaired fasting glucose    5. Screening for thyroid disorder    6. Screening for blood disease          Plan:   1. Low back pain, unspecified back pain laterality, unspecified chronicity, unspecified whether sciatica present  - meloxicam (MOBIC) 7.5 MG tablet; Take 1 tablet (7.5 mg total) by mouth once daily.  Dispense: 30 tablet; Refill: 0    2. Primary hypertension  - TSH; Future  - T4, Free; Future  - Lipid Panel; Future  - Hemoglobin A1C; Future  - Comprehensive Metabolic Panel; Future  - CBC Without Differential; Future    3. Mixed hyperlipidemia  - Lipid Panel; Future    4. Impaired fasting glucose  - Hemoglobin A1C; Future  - Comprehensive Metabolic Panel; Future    5. Screening for thyroid disorder  - TSH; Future  - T4, Free; Future    6. Screening for blood disease  - TSH; Future  - T4, Free; Future  - Lipid Panel; Future  - Hemoglobin A1C; Future  - Comprehensive Metabolic Panel; Future  - CBC Without Differential; Future     Discussed uti symptoms have resolved, discussed culture results in depth.  -Apply heat to painful area several times daily  -Do gentle lower back stretches as provided  -Use antiinflammatory as instructed  -Rest back, avoid heavy lifting or workouts x2 weeks  -Elevate feet when sitting  -Follow up as needed      Jack Hodge NP   [facility]  8/8/23

## 2023-08-14 ENCOUNTER — OFFICE VISIT (OUTPATIENT)
Dept: CARDIOLOGY | Facility: CLINIC | Age: 78
End: 2023-08-14
Payer: MEDICARE

## 2023-08-14 VITALS
OXYGEN SATURATION: 97 % | HEART RATE: 64 BPM | HEIGHT: 65 IN | BODY MASS INDEX: 25.94 KG/M2 | WEIGHT: 155.69 LBS | DIASTOLIC BLOOD PRESSURE: 65 MMHG | SYSTOLIC BLOOD PRESSURE: 175 MMHG

## 2023-08-14 DIAGNOSIS — Z78.9 STATIN INTOLERANCE: ICD-10-CM

## 2023-08-14 DIAGNOSIS — I10 PRIMARY HYPERTENSION: ICD-10-CM

## 2023-08-14 DIAGNOSIS — E78.2 MIXED HYPERLIPIDEMIA: ICD-10-CM

## 2023-08-14 DIAGNOSIS — I70.0 AORTIC ATHEROSCLEROSIS: ICD-10-CM

## 2023-08-14 DIAGNOSIS — I65.23 BILATERAL CAROTID ARTERY STENOSIS: ICD-10-CM

## 2023-08-14 PROCEDURE — 99214 OFFICE O/P EST MOD 30 MIN: CPT | Mod: S$PBB,,, | Performed by: INTERNAL MEDICINE

## 2023-08-14 PROCEDURE — 99213 OFFICE O/P EST LOW 20 MIN: CPT | Mod: PBBFAC,PN | Performed by: INTERNAL MEDICINE

## 2023-08-14 PROCEDURE — 99999 PR PBB SHADOW E&M-EST. PATIENT-LVL III: ICD-10-PCS | Mod: PBBFAC,,, | Performed by: INTERNAL MEDICINE

## 2023-08-14 PROCEDURE — 99214 PR OFFICE/OUTPT VISIT, EST, LEVL IV, 30-39 MIN: ICD-10-PCS | Mod: S$PBB,,, | Performed by: INTERNAL MEDICINE

## 2023-08-14 PROCEDURE — 99999 PR PBB SHADOW E&M-EST. PATIENT-LVL III: CPT | Mod: PBBFAC,,, | Performed by: INTERNAL MEDICINE

## 2023-08-14 NOTE — ASSESSMENT & PLAN NOTE
Goal BP < 140/90 given age.  Compliant w/ meds.   - consider increase in HCTZ v. Addition of low dose amlodipine if needed   - check echo to eval cardiac function   - continue current therapy for now  - risk factor and lifestyle modifications

## 2023-08-14 NOTE — ASSESSMENT & PLAN NOTE
Goal LDL < 70, last  3/2023.  Compliant w/ statin therapy.  H/o statin intolerance.    - continue current therapy   - risk factor and lifestyle modifications

## 2023-08-14 NOTE — PROGRESS NOTES
Subjective:    Patient ID:  Elizabeth Gleason is a 77 y.o. female who presents for evaluation of No chief complaint on file.      PCP: Selena Warren,      HPI  Pt is a 76 yo F w/ PMH of HTN, HLD, Bilateral carotid artery stenosis, and aortic atherosclerosis who presents for evaluation.  She states that she has been hearing her heart beat in her L ear x3 months.  She denies cp, sob, edema, orthopnea, PND, presyncope, LOC, palpitations, and claudication.  She notes compliance w/ meds and denies side effects.  She monitors her BP at home and states it runs 140s/60-70s.  She does some PT exercises at home however does not routinely exercise.      Past Medical History:   Diagnosis Date    ALLERGIC RHINITIS     Chronic diarrhea     GERD (gastroesophageal reflux disease)     HEARING LOSS     Hypertension     Impaired fasting glucose     Osteopenia     Tuberculosis     childhood TB     Past Surgical History:   Procedure Laterality Date    COLONOSCOPY N/A 8/25/2017    Procedure: COLONOSCOPY;  Surgeon: Keny Lui MD;  Location: [address];  Service: Endoscopy;  Laterality: N/A;    left 5th toe surgery      rt ear surgery      rt lung lobectomy and a rib removed      secondary to TB    TUBAL LIGATION       Social History     Socioeconomic History    Marital status:    Occupational History    Occupation: retired teller   Tobacco Use    Smoking status: Never    Smokeless tobacco: Never   Substance and Sexual Activity    Alcohol use: Yes     Alcohol/week: 1.0 standard drink of alcohol     Types: 1 Glasses of wine per week     Comment: maybe 1--2 monthly    Drug use: No    Sexual activity: Not Currently     Comment:    Social History Narrative    She exercises regularly.     Family History   Problem Relation Age of Onset    Heart disease Mother         cabg    Kidney disease Mother         was on dialysis    Stroke Father     Cancer Brother         lung cancer    No Known Problems Son      Breast cancer Cousin         paternal FIRST cousin    Ovarian cancer Neg Hx     Esophageal cancer Neg Hx     Colon cancer Neg Hx        Review of patient's allergies indicates:   Allergen Reactions    Bempedoic acid      Patient developed myopathy    Levaquin [levofloxacin]      Heart raced and felt faint    Sulfa (sulfonamide antibiotics) Nausea And Vomiting    Tramadol Nausea And Vomiting       Medication List with Changes/Refills   Current Medications    ASPIRIN (ECOTRIN) 81 MG EC TABLET    Take 81 mg by mouth once daily.    AZELASTINE (ASTELIN) 137 MCG (0.1 %) NASAL SPRAY    1 spray (137 mcg total) by Nasal route 2 (two) times daily.    BLOOD SUGAR DIAGNOSTIC STRP    To check BG 1 times daily, to use with insurance preferred meter    BLOOD-GLUCOSE METER KIT    To check BG 1 times daily, to use with insurance preferred meter    DIAZEPAM (VALIUM) 5 MG TABLET    Take 1 tablet (5 mg total) by mouth daily as needed for Anxiety.    DIPHENOXYLATE-ATROPINE 2.5-0.025 MG (LOMOTIL) 2.5-0.025 MG PER TABLET    Take 1 tablet by mouth 4 (four) times daily as needed for Diarrhea.    ESCITALOPRAM OXALATE (LEXAPRO) 20 MG TABLET    Take 1 tablet (20 mg total) by mouth every evening.    IBUPROFEN (ADVIL,MOTRIN) 200 MG TABLET    Take 200 mg by mouth as needed for Pain.    LANCETS MISC    To check BG 1 times daily, to use with insurance preferred meter    LEVOTHYROXINE (SYNTHROID) 88 MCG TABLET    Take 1 tablet (88 mcg total) by mouth once daily.    LOSARTAN-HYDROCHLOROTHIAZIDE 100-12.5 MG (HYZAAR) 100-12.5 MG TAB    Take 1 tablet by mouth once daily.    MELOXICAM (MOBIC) 7.5 MG TABLET    Take 1 tablet (7.5 mg total) by mouth once daily.    MULTIVIT-MIN-IRON-FA-LUTEIN 8 MG IRON-400 MCG-300 MCG TAB    Take 1 tablet by mouth once daily.    OMEGA-3 FATTY ACIDS/FISH OIL (FISH OIL-OMEGA-3 FATTY ACIDS) 300-1,000 MG CAPSULE    Take 1 capsule by mouth once daily.    PHENAZOPYRIDINE (PYRIDIUM) 100 MG TABLET    Take 1 tablet (100 mg total) by  "mouth 3 (three) times daily as needed for Pain (bladder).    PITAVASTATIN CALCIUM (LIVALO) 1 MG TAB TABLET    Take 1 tablet (1 mg total) by mouth every evening.       Review of Systems   Constitutional: Negative for diaphoresis and fever.   HENT:  Negative for congestion and hearing loss.    Eyes:  Negative for blurred vision and pain.   Cardiovascular:  Negative for chest pain, claudication, dyspnea on exertion, leg swelling, near-syncope, palpitations and syncope.   Respiratory:  Negative for shortness of breath and sleep disturbances due to breathing.    Hematologic/Lymphatic: Negative for bleeding problem. Does not bruise/bleed easily.   Skin:  Negative for color change and poor wound healing.   Gastrointestinal:  Negative for abdominal pain and nausea.   Genitourinary:  Negative for bladder incontinence and flank pain.   Neurological:  Negative for focal weakness and light-headedness.        Objective:   BP (!) 175/65 (BP Location: Left arm, Patient Position: Sitting)   Pulse 64   Ht 5' 5" (1.651 m)   Wt 70.6 kg (155 lb 11.2 oz)   SpO2 97%   BMI 25.91 kg/m²    Physical Exam  Constitutional:       Appearance: She is well-developed. She is not diaphoretic.   HENT:      Head: Normocephalic and atraumatic.   Eyes:      General: No scleral icterus.     Pupils: Pupils are equal, round, and reactive to light.   Neck:      Vascular: No JVD.   Cardiovascular:      Rate and Rhythm: Normal rate and regular rhythm.      Pulses: Intact distal pulses.      Heart sounds: S1 normal and S2 normal. No murmur heard.     No friction rub. No gallop.   Pulmonary:      Effort: Pulmonary effort is normal. No respiratory distress.      Breath sounds: Normal breath sounds. No wheezing or rales.   Chest:      Chest wall: No tenderness.   Abdominal:      General: Bowel sounds are normal. There is no distension.      Palpations: Abdomen is soft. There is no mass.      Tenderness: There is no abdominal tenderness. There is no rebound. "   Musculoskeletal:         General: No tenderness. Normal range of motion.      Cervical back: Normal range of motion and neck supple.      Right lower leg: No edema.      Left lower leg: No edema.   Skin:     General: Skin is warm and dry.      Coloration: Skin is not pale.   Neurological:      Mental Status: She is alert and oriented to person, place, and time.      Coordination: Coordination normal.      Deep Tendon Reflexes: Reflexes normal.   Psychiatric:         Behavior: Behavior normal.         Judgment: Judgment normal.           EC2023- reviewed.  Sinus bradycardia, nonspecific ST abnormality.      Assessment:       1. Statin intolerance    2. Primary hypertension    3. Bilateral carotid artery stenosis    4. Mixed hyperlipidemia    5. Aortic atherosclerosis         Plan:         Statin intolerance  Tolerate pitavastatin.  Continue current therapy.      Primary hypertension  Goal BP < 140/90 given age.  Compliant w/ meds.   - consider increase in HCTZ v. Addition of low dose amlodipine if needed   - check echo to eval cardiac function   - continue current therapy for now  - risk factor and lifestyle modifications       Bilateral carotid artery stenosis  Non h/o CVA.  Continue medical therapy.  Risk factor and lifestyle modifications.      Mixed hyperlipidemia  Goal LDL < 70, last  3/2023.  Compliant w/ statin therapy.  H/o statin intolerance.    - continue current therapy   - risk factor and lifestyle modifications     Aortic atherosclerosis  Continue current therapy.        Total duration of face to face visit time 30 minutes.  Total time spent counseling greater than fifty percent of total visit time.  Counseling included discussion regarding imaging findings, diagnosis, possibilities, treatment options, risks and benefits.  The patient had many questions regarding the options and long-term effects      Solitario Dougherty M.D.  Interventional Cardiology

## 2023-09-04 DIAGNOSIS — M54.50 LOW BACK PAIN, UNSPECIFIED BACK PAIN LATERALITY, UNSPECIFIED CHRONICITY, UNSPECIFIED WHETHER SCIATICA PRESENT: ICD-10-CM

## 2023-09-05 RX ORDER — MELOXICAM 7.5 MG/1
7.5 TABLET ORAL
Qty: 30 TABLET | Refills: 0 | Status: SHIPPED | OUTPATIENT
Start: 2023-09-05 | End: 2023-10-03

## 2023-09-20 ENCOUNTER — TELEPHONE (OUTPATIENT)
Dept: CARDIOLOGY | Facility: CLINIC | Age: 78
End: 2023-09-20
Payer: MEDICARE

## 2023-09-20 NOTE — TELEPHONE ENCOUNTER
----- Message from Desi Rutledge sent at 9/20/2023 10:30 AM CDT -----  Type:  Needs Medical Advice    Who Called: pt  Would the patient rather a call back or a response via [facility]? call  Best Call Back Number: [phone]  Additional Information: pt stated she was suppose to receive a call from nurse regarding getting an ultrasound scheduled

## 2023-09-21 ENCOUNTER — OFFICE VISIT (OUTPATIENT)
Dept: FAMILY MEDICINE | Facility: CLINIC | Age: 78
End: 2023-09-21
Payer: MEDICARE

## 2023-09-21 VITALS
WEIGHT: 156.5 LBS | OXYGEN SATURATION: 97 % | DIASTOLIC BLOOD PRESSURE: 72 MMHG | HEART RATE: 68 BPM | TEMPERATURE: 98 F | SYSTOLIC BLOOD PRESSURE: 139 MMHG | BODY MASS INDEX: 26.08 KG/M2 | HEIGHT: 65 IN

## 2023-09-21 DIAGNOSIS — F41.1 GENERALIZED ANXIETY DISORDER: ICD-10-CM

## 2023-09-21 DIAGNOSIS — I65.23 BILATERAL CAROTID ARTERY STENOSIS: ICD-10-CM

## 2023-09-21 DIAGNOSIS — G44.229 CHRONIC TENSION-TYPE HEADACHE, NOT INTRACTABLE: ICD-10-CM

## 2023-09-21 DIAGNOSIS — I70.0 AORTIC ATHEROSCLEROSIS: ICD-10-CM

## 2023-09-21 DIAGNOSIS — E03.9 ACQUIRED HYPOTHYROIDISM: ICD-10-CM

## 2023-09-21 DIAGNOSIS — M85.89 OSTEOPENIA OF MULTIPLE SITES: ICD-10-CM

## 2023-09-21 DIAGNOSIS — E78.2 MIXED HYPERLIPIDEMIA: ICD-10-CM

## 2023-09-21 DIAGNOSIS — R73.01 IMPAIRED FASTING GLUCOSE: ICD-10-CM

## 2023-09-21 DIAGNOSIS — K58.0 IRRITABLE BOWEL SYNDROME WITH DIARRHEA: ICD-10-CM

## 2023-09-21 DIAGNOSIS — I10 PRIMARY HYPERTENSION: Primary | ICD-10-CM

## 2023-09-21 PROBLEM — R33.9 URINARY RETENTION: Status: RESOLVED | Noted: 2023-05-26 | Resolved: 2023-09-21

## 2023-09-21 PROBLEM — Z78.9 STATIN INTOLERANCE: Status: RESOLVED | Noted: 2021-03-08 | Resolved: 2023-09-21

## 2023-09-21 PROBLEM — U07.1 COVID-19 VIRUS INFECTION: Status: RESOLVED | Noted: 2023-05-26 | Resolved: 2023-09-21

## 2023-09-21 PROCEDURE — 99999 PR PBB SHADOW E&M-EST. PATIENT-LVL IV: ICD-10-PCS | Mod: PBBFAC,,, | Performed by: STUDENT IN AN ORGANIZED HEALTH CARE EDUCATION/TRAINING PROGRAM

## 2023-09-21 PROCEDURE — 99215 OFFICE O/P EST HI 40 MIN: CPT | Mod: S$PBB,,, | Performed by: STUDENT IN AN ORGANIZED HEALTH CARE EDUCATION/TRAINING PROGRAM

## 2023-09-21 PROCEDURE — 99999 PR PBB SHADOW E&M-EST. PATIENT-LVL IV: CPT | Mod: PBBFAC,,, | Performed by: STUDENT IN AN ORGANIZED HEALTH CARE EDUCATION/TRAINING PROGRAM

## 2023-09-21 PROCEDURE — 99215 PR OFFICE/OUTPT VISIT, EST, LEVL V, 40-54 MIN: ICD-10-PCS | Mod: S$PBB,,, | Performed by: STUDENT IN AN ORGANIZED HEALTH CARE EDUCATION/TRAINING PROGRAM

## 2023-09-21 PROCEDURE — 99214 OFFICE O/P EST MOD 30 MIN: CPT | Mod: PBBFAC,PN | Performed by: STUDENT IN AN ORGANIZED HEALTH CARE EDUCATION/TRAINING PROGRAM

## 2023-09-21 RX ORDER — LEVOTHYROXINE SODIUM 88 UG/1
88 TABLET ORAL DAILY
Qty: 90 TABLET | Refills: 3 | Status: SHIPPED | OUTPATIENT
Start: 2023-09-21

## 2023-09-21 RX ORDER — DIAZEPAM 5 MG/1
5 TABLET ORAL DAILY PRN
Qty: 30 TABLET | Refills: 0 | Status: SHIPPED | OUTPATIENT
Start: 2023-09-21 | End: 2024-03-27 | Stop reason: SDUPTHER

## 2023-09-21 RX ORDER — NEOMYCIN SULFATE, POLYMYXIN B SULFATE AND HYDROCORTISONE 10; 3.5; 1 MG/ML; MG/ML; [USP'U]/ML
3 SUSPENSION/ DROPS AURICULAR (OTIC) 4 TIMES DAILY
Qty: 10 ML | Refills: 2 | Status: SHIPPED | OUTPATIENT
Start: 2023-09-21

## 2023-09-21 NOTE — PROGRESS NOTES
Subjective:       Patient ID: Elizabeth Gleason is a 78 y.o. female.    Chief Complaint: Follow-up (3 month f/u/Lab results)      Active Problem List with Overview Notes    Diagnosis Date Noted    Spastic colon 09/08/2022     Lomotil PRN   Follows with GI; Yuriy; annually       Aortic atherosclerosis 01/10/2020     Asa and statin       Mixed hyperlipidemia 03/01/2018     Controlled  pitavastatin 1mg QHS   Tolerates   Has hx of statin intolerance in past; follows with cards       Anxiety disorder 03/01/2018     lexapro 20   Valium PRN; less than weekly  1 RX lasts minimum 6 months;  review confirms   She has been having more anxiety lately with tremors, restlessness, hyperactive; has been taking more due to this  Has refilled 2x in past 3 months       Acquired hypothyroidism 11/16/2017     Synthroid 88  Asymptomatic   TSH WNL      Tension type headache 06/10/2016     lexapro 20   This has helped a lot   Not really having issues any longer      Primary hypertension      Well controlled  Losartan/HCTZ 100/12.5  Asymptomatic         Impaired fasting glucose      Diet controlled  elevated fasting glucose       Osteopenia      Ca/Vit D   strength training       Bilateral carotid artery stenosis      Follows with cards  Statin and asa          Review of Systems   Neurological:  Positive for tremors (notes in AM mostly; thinks related to anxiety).   Psychiatric/Behavioral:  The patient is nervous/anxious.    All other systems reviewed and are negative.       A1C:  Recent Labs   Lab 09/08/22  1159 03/10/23  0732 09/14/23  0922   Hemoglobin A1C 5.4 5.6 5.3     CBC:  Recent Labs   Lab 03/10/23  0732 05/26/23  0902 09/14/23  0922   WBC 7.24 5.93 8.35   RBC 4.28 4.48 4.27   Hemoglobin 12.7 13.7 12.8   Hematocrit 38.5 39.3 38.7   Platelets 336 273 365   MCV 90 88 91   MCH 29.7 30.6 30.0   MCHC 33.0 34.9 33.1     CMP:  Recent Labs   Lab 05/26/23  0902 05/27/23  0550 06/05/23  1548 09/14/23  0922   Glucose 111 H   < > 105  114 H   Calcium 8.6 L   < > 9.1 8.9   Albumin 4.3  --  4.4 4.3   Total Protein 7.5  --  7.3 7.4   Sodium 140   < > 141 141   Potassium 3.1 L   < > 4.1 3.7   CO2 28   < > 30 H 27   Chloride 104   < > 101 104   BUN 15   < > 18 H 16   Creatinine 0.95   < > 0.87 0.63   Alkaline Phosphatase 80  --  74 71   ALT 33  --  40 20   AST 44  --  29 26   Total Bilirubin 0.3  --  0.3 0.4    < > = values in this interval not displayed.     LIPIDS:  Recent Labs   Lab 03/04/22  0903 09/08/22  1159 03/10/23  0732 05/26/23  0902 09/14/23  0921 09/14/23  0922   TSH 0.720   < > 0.518   < >  --  1.650   HDL 39 L  --  39 L  --  36 L  --    Cholesterol 219 H  --  189  --  188  --    Triglycerides 316 H  --  246 H  --  285 H  --    LDL Cholesterol 116.8  --  100.8  --  95.0  --    HDL/Cholesterol Ratio 17.8 L  --  20.6  --  19.1 L  --    Non-HDL Cholesterol 180  --  150  --  152  --    Total Cholesterol/HDL Ratio 5.6 H  --  4.8  --  5.2 H  --     < > = values in this interval not displayed.     TSH:  Recent Labs   Lab 03/10/23  0732 05/26/23  0902 09/14/23 0922   TSH 0.518 0.958 1.650        Objective:      Vitals:    09/21/23 1438   BP: 139/72   Pulse: 68   Temp: 98.1 °F (36.7 °C)      Physical Exam  Vitals reviewed.   Constitutional:       Appearance: Normal appearance. She is normal weight.   HENT:      Head: Normocephalic and atraumatic.   Eyes:      Conjunctiva/sclera: Conjunctivae normal.   Cardiovascular:      Rate and Rhythm: Normal rate and regular rhythm.      Heart sounds: Normal heart sounds.   Pulmonary:      Effort: Pulmonary effort is normal.      Breath sounds: Normal breath sounds.   Abdominal:      Palpations: Abdomen is soft.      Tenderness: There is no abdominal tenderness.   Musculoskeletal:         General: Normal range of motion.      Cervical back: Normal range of motion.      Right lower leg: No edema.      Left lower leg: No edema.   Neurological:      Mental Status: She is alert. Mental status is at baseline.    Psychiatric:         Mood and Affect: Mood normal.         Behavior: Behavior normal.          Assessment:       1. Primary hypertension    2. Acquired hypothyroidism    3. Generalized anxiety disorder    4. Chronic tension-type headache, not intractable    5. Bilateral carotid artery stenosis    6. Mixed hyperlipidemia    7. Aortic atherosclerosis    8. Impaired fasting glucose    9. Irritable bowel syndrome with diarrhea    10. Osteopenia of multiple sites        Plan:   1. Primary hypertension    2. Acquired hypothyroidism  - levothyroxine (SYNTHROID) 88 MCG tablet; Take 1 tablet (88 mcg total) by mouth once daily.  Dispense: 90 tablet; Refill: 3    3. Generalized anxiety disorder  - diazePAM (VALIUM) 5 MG tablet; Take 1 tablet (5 mg total) by mouth daily as needed for Anxiety.  Dispense: 30 tablet; Refill: 0    4. Chronic tension-type headache, not intractable    5. Bilateral carotid artery stenosis    6. Mixed hyperlipidemia    7. Aortic atherosclerosis    8. Impaired fasting glucose    9. Irritable bowel syndrome with diarrhea    10. Osteopenia of multiple sites     Labs reviewed in detail  Continue healthy lifestyle efforts  Continue current meds as prescribed otherwise; refills per request  Keep routine specialist f/u   RTC in 6 months  with labs prior and/or PRN          [facility]   9/21/23     I spent a total of >40 minutes on the day of the visit.This includes face to face time and non-face to face time preparing to see the patient (eg, review of tests), obtaining and/or reviewing separately obtained history, documenting clinical information in the electronic or other health record, independently interpreting results and communicating results to the patient/family/caregiver, or care coordinator.

## 2023-09-26 ENCOUNTER — TELEPHONE (OUTPATIENT)
Dept: CARDIOLOGY | Facility: CLINIC | Age: 78
End: 2023-09-26
Payer: MEDICARE

## 2023-09-26 NOTE — TELEPHONE ENCOUNTER
----- Message from Solitario Dougherty III, MD sent at 9/25/2023  4:23 PM CDT -----  Please contact the patient and let them know that their results were fine and do not require any change in treatment.

## 2023-10-03 DIAGNOSIS — M54.50 LOW BACK PAIN, UNSPECIFIED BACK PAIN LATERALITY, UNSPECIFIED CHRONICITY, UNSPECIFIED WHETHER SCIATICA PRESENT: ICD-10-CM

## 2023-10-03 RX ORDER — MELOXICAM 7.5 MG/1
7.5 TABLET ORAL
Qty: 30 TABLET | Refills: 0 | Status: SHIPPED | OUTPATIENT
Start: 2023-10-03

## 2023-10-18 ENCOUNTER — TELEPHONE (OUTPATIENT)
Dept: FAMILY MEDICINE | Facility: CLINIC | Age: 78
End: 2023-10-18
Payer: MEDICARE

## 2023-10-18 RX ORDER — DIPHENOXYLATE HYDROCHLORIDE AND ATROPINE SULFATE 2.5; .025 MG/1; MG/1
1 TABLET ORAL 4 TIMES DAILY PRN
Qty: 120 TABLET | Refills: 3 | Status: SHIPPED | OUTPATIENT
Start: 2023-10-18 | End: 2024-01-19 | Stop reason: SDUPTHER

## 2024-01-03 ENCOUNTER — TELEPHONE (OUTPATIENT)
Dept: OBSTETRICS AND GYNECOLOGY | Facility: CLINIC | Age: 79
End: 2024-01-03
Payer: MEDICARE

## 2024-01-03 DIAGNOSIS — Z12.31 SCREENING MAMMOGRAM FOR BREAST CANCER: Primary | ICD-10-CM

## 2024-01-03 NOTE — TELEPHONE ENCOUNTER
----- Message from Jillian Dougherty sent at 1/3/2024  3:20 PM CST -----  Contact: pt  Type:  Need order for mammo    Who Called: pt   Would the patient rather a call back or a response via [facility]? call  Best Call Back Number: [phone]  Additional Information:   Pt requesting a call once order in to get scheduled

## 2024-01-07 ENCOUNTER — OFFICE VISIT (OUTPATIENT)
Dept: URGENT CARE | Facility: CLINIC | Age: 79
End: 2024-01-07
Payer: MEDICARE

## 2024-01-07 VITALS
TEMPERATURE: 98 F | WEIGHT: 156 LBS | DIASTOLIC BLOOD PRESSURE: 79 MMHG | OXYGEN SATURATION: 97 % | SYSTOLIC BLOOD PRESSURE: 180 MMHG | HEART RATE: 65 BPM | RESPIRATION RATE: 18 BRPM | HEIGHT: 65 IN | BODY MASS INDEX: 25.99 KG/M2

## 2024-01-07 DIAGNOSIS — I10 PRIMARY HYPERTENSION: ICD-10-CM

## 2024-01-07 DIAGNOSIS — I70.0 AORTIC ATHEROSCLEROSIS: ICD-10-CM

## 2024-01-07 DIAGNOSIS — E78.2 MIXED HYPERLIPIDEMIA: ICD-10-CM

## 2024-01-07 DIAGNOSIS — N30.00 ACUTE CYSTITIS WITHOUT HEMATURIA: Primary | ICD-10-CM

## 2024-01-07 DIAGNOSIS — R30.0 DYSURIA: ICD-10-CM

## 2024-01-07 LAB
BILIRUB UR QL STRIP: NEGATIVE
GLUCOSE UR QL STRIP: NEGATIVE
KETONES UR QL STRIP: NEGATIVE
LEUKOCYTE ESTERASE UR QL STRIP: NEGATIVE
PH, POC UA: 8
POC BLOOD, URINE: NEGATIVE
POC NITRATES, URINE: NEGATIVE
PROT UR QL STRIP: NEGATIVE
SP GR UR STRIP: 1.01 (ref 1–1.03)
UROBILINOGEN UR STRIP-ACNC: NORMAL (ref 0.1–1.1)

## 2024-01-07 PROCEDURE — 81003 URINALYSIS AUTO W/O SCOPE: CPT | Mod: QW,S$GLB,, | Performed by: PHYSICIAN ASSISTANT

## 2024-01-07 PROCEDURE — 99214 OFFICE O/P EST MOD 30 MIN: CPT | Mod: S$GLB,,, | Performed by: PHYSICIAN ASSISTANT

## 2024-01-07 PROCEDURE — 87086 URINE CULTURE/COLONY COUNT: CPT | Performed by: PHYSICIAN ASSISTANT

## 2024-01-07 RX ORDER — NITROFURANTOIN 25; 75 MG/1; MG/1
100 CAPSULE ORAL 2 TIMES DAILY
Qty: 10 CAPSULE | Refills: 0 | Status: SHIPPED | OUTPATIENT
Start: 2024-01-07 | End: 2024-01-12

## 2024-01-07 NOTE — PATIENT INSTRUCTIONS
You must understand that you've received an Urgent Care treatment only and that you may be released before all your medical problems are known or treated. You, the patient, will arrange for follow up care as instructed.      Follow up with your PCP or specialty clinic as instructed in the next 2-3 days if not improved or as needed. You can call [phone] to schedule an appointment with appropriate provider.      If you condition worsens, we recommend that you receive another evaluation at the emergency room immediately or contact your primary medical clinic's after hours call service to discuss your concerns.      Please return here or go to the Emergency Department for any concerns or worsening condition.      If you were prescribed a narcotic or controlled substance, do not drive or operate heavy equipment or machinery while taking these medications.

## 2024-01-08 LAB
BACTERIA UR CULT: NORMAL
BACTERIA UR CULT: NORMAL

## 2024-01-15 ENCOUNTER — TELEPHONE (OUTPATIENT)
Dept: URGENT CARE | Facility: CLINIC | Age: 79
End: 2024-01-15
Payer: MEDICARE

## 2024-01-15 NOTE — TELEPHONE ENCOUNTER
Pt called returning Marie's phone call. Informed pt that her urine culture was negative. Pt verbalized understanding and stated that she is feeling better.

## 2024-01-19 DIAGNOSIS — K52.9 CHRONIC DIARRHEA: Primary | ICD-10-CM

## 2024-01-19 RX ORDER — DIPHENOXYLATE HYDROCHLORIDE AND ATROPINE SULFATE 2.5; .025 MG/1; MG/1
1 TABLET ORAL 4 TIMES DAILY PRN
Qty: 120 TABLET | Refills: 3 | Status: CANCELLED | OUTPATIENT
Start: 2024-01-19

## 2024-01-19 RX ORDER — DIPHENOXYLATE HYDROCHLORIDE AND ATROPINE SULFATE 2.5; .025 MG/1; MG/1
1 TABLET ORAL 4 TIMES DAILY PRN
Qty: 120 TABLET | Refills: 3 | Status: SHIPPED | OUTPATIENT
Start: 2024-01-19 | End: 2024-01-19 | Stop reason: SDUPTHER

## 2024-01-22 RX ORDER — DIPHENOXYLATE HYDROCHLORIDE AND ATROPINE SULFATE 2.5; .025 MG/1; MG/1
1 TABLET ORAL 4 TIMES DAILY PRN
Qty: 120 TABLET | Refills: 3 | Status: SHIPPED | OUTPATIENT
Start: 2024-01-22 | End: 2024-05-23 | Stop reason: SDUPTHER

## 2024-01-23 ENCOUNTER — TELEPHONE (OUTPATIENT)
Dept: GASTROENTEROLOGY | Facility: CLINIC | Age: 79
End: 2024-01-23
Payer: MEDICARE

## 2024-01-23 NOTE — TELEPHONE ENCOUNTER
----- Message from Sarah Hu sent at 1/23/2024  3:25 PM CST -----  Regarding: appt access  Contact: [phone]  Pt is calling to speak with someone in provider office in regards to getting scheduled with the provider pt  is asking for a return call please call pt at   [phone]

## 2024-01-23 NOTE — TELEPHONE ENCOUNTER
Ma called and spoke with  patient and scheduled patient for 03/01/24 at 01:30 patient verbalize understanding

## 2024-02-27 NOTE — TELEPHONE ENCOUNTER
----- Message from Reji Mckeon sent at 2/27/2024  2:25 PM CST -----  Contact: pt  Type: Requesting refill        Who Called: PT  Regarding: fluticasone (FLONASE) 50 mcg/actuation nasal spray   Would the patient rather a call back or a response via [facility]? Call back  Best Call Back Number: [phone]  Pharmacy Information: [facility] [place] - [address]   Phone: [phone]  Fax: [phone]

## 2024-02-27 NOTE — TELEPHONE ENCOUNTER
No care due was identified.  [facility] Embedded Care Due Messages. Reference number: [id number].   2/27/2024 3:50:29 PM CST

## 2024-02-28 RX ORDER — FLUTICASONE PROPIONATE 50 MCG
2 SPRAY, SUSPENSION (ML) NASAL DAILY
Qty: 9.9 ML | Refills: 3 | Status: SHIPPED | OUTPATIENT
Start: 2024-02-28

## 2024-03-01 ENCOUNTER — OFFICE VISIT (OUTPATIENT)
Dept: GASTROENTEROLOGY | Facility: CLINIC | Age: 79
End: 2024-03-01
Payer: MEDICARE

## 2024-03-01 VITALS
DIASTOLIC BLOOD PRESSURE: 76 MMHG | HEART RATE: 60 BPM | WEIGHT: 157.44 LBS | SYSTOLIC BLOOD PRESSURE: 192 MMHG | BODY MASS INDEX: 26.19 KG/M2

## 2024-03-01 DIAGNOSIS — K52.9 CHRONIC DIARRHEA: Primary | ICD-10-CM

## 2024-03-01 PROCEDURE — 99999 PR PBB SHADOW E&M-EST. PATIENT-LVL III: CPT | Mod: PBBFAC,,, | Performed by: INTERNAL MEDICINE

## 2024-03-01 PROCEDURE — 99214 OFFICE O/P EST MOD 30 MIN: CPT | Mod: S$PBB,,, | Performed by: INTERNAL MEDICINE

## 2024-03-01 PROCEDURE — 99213 OFFICE O/P EST LOW 20 MIN: CPT | Mod: PBBFAC | Performed by: INTERNAL MEDICINE

## 2024-03-01 NOTE — PROGRESS NOTES
Reason for visit: Diarrhea    HPI:  Ms. Gleason is a 78-year-old lady last seen in March 2023 for chronic diarrhea. She has HTN, HLP and hypothyroidism. She had been complaining of loose bowel movements with urge fecal incontinence, which has not responded to probiotics or Bentyl. In the past, she has undergone stool testing for C. diff, routine cultures, Giardia, negative ova, parasites and no evidence of white cells.  She has also been tested for pancreatic insufficiency with stool for fecal elastase and was negative.  Prior colonoscopy for screening from 2017 was unremarkable.  She had previously seen the surgeon, Dr. Lui, for fecal urgency and digital rectal exam revealed good tone and defecography was normal as well.  Previously we had discussed about repeating colonoscopy with biopsies to evaluate for microscopic colitis, however she did not want to pursue it. No dysphagia or odynophagia. No heartburn or acid regurgitation. Has occasional choking with liquids. No trouble with solids.     Her symptoms are well controlled on Lomotil 1 tablet 4 times a day (9 am -12 noon-3 pm-6 pm).  She has also been taking Metamucil 1 teaspoon once a week. She has noted that her stools are more formed and she moves her bowels once or twice a day now. She is tolerating Lomotil well with no side effects. Denies any dysphagia, odynophagia, nausea, vomiting, heartburn or acid regurgitation. No abdominal pains, changes in bowel pattern, blood/ mucus in stool or unintentional weight loss. No melena or maroon stools. No recent changes in diet or medications. No family history of IBD, Celiac disease or GI malignancy. No regular NSAIDs, alcohol or tobacco use. No recent antibiotic use, travels or sick contacts. No prior history of C.diff.    Continues to do well. No new issues.     Past medical, surgical, social and family history reviewed in epic    Medication allergies reviewed in epic    Review of systems:    Constitutional:  No  fever, no chills, no weight loss, appetite is normal  Eyes:  No visual changes or red eyes  ENT:  No odynophagia or hoarseness of voice  Cardiovascular:  No angina or palpitation  Respiratory:  No shortness breath or wheezing  Genitourinary:  No dysuria or frequency  Musculoskeletal:  No myalgias or arthralgias  Skin:  No pruritus or eczema  Neurologic:  No headache or seizures  Psychiatric:  No anxiety depression  Gastrointestinal:  See HPI    Physical exam:  Vitals see epic, awake, alert, oriented x3 in no acute distress    Neck:  Supple, no carotid bruit, no cervical adenopathy  Abdomen:  Flat, soft, nontender, nondistended, no masses palpable, no hepatosplenomegaly detected, bowel sounds are normal, no ascites clinically detectable  Eyes:  Conjunctivae anicteric, not injected  ENT:  Oral mucosa moist  Cardiovascular:  S1, S2 normal, no murmurs, no gallops, no abdominal bruits heard  Respiratory:  Bilateral air entry equal, no rhonchi, no crackles, normal effort  Skin:  No palmar erythema or spider angiomata  Neurologic:  No asterixis or tremors  Psychiatric:  Affect appropriate, proper judgment, proper insight, oriented to place and time  Lower extremities:  No pedal edema    Prior labs, imaging and endoscopy reports reviewed.      Impression: Chronic diarrhea well controlled on Lomotil 3 times daily with Metamucil 1 tbsp once a week.     Recommendations:      Continue metamucil 1 teaspoon daily and Lomotil 1 qid for diarrhea (medication needs to be faxed to [facility]; will do 90 day supply the next time)  2.  Follow up in 1 year or sooner with any issues.

## 2024-03-06 ENCOUNTER — TELEPHONE (OUTPATIENT)
Dept: FAMILY MEDICINE | Facility: CLINIC | Age: 79
End: 2024-03-06
Payer: MEDICARE

## 2024-03-06 DIAGNOSIS — I10 PRIMARY HYPERTENSION: ICD-10-CM

## 2024-03-06 DIAGNOSIS — R79.9 ABNORMAL FINDING OF BLOOD CHEMISTRY, UNSPECIFIED: ICD-10-CM

## 2024-03-06 DIAGNOSIS — E78.2 MIXED HYPERLIPIDEMIA: Primary | ICD-10-CM

## 2024-03-06 DIAGNOSIS — E03.9 ACQUIRED HYPOTHYROIDISM: ICD-10-CM

## 2024-03-06 NOTE — TELEPHONE ENCOUNTER
Called and spoke with pt in regards of her message. Pt would like to get her labs done before her apt on 3/21/2024 with Dr. Warren. Please place lab orders in system for patient. Please advise message.

## 2024-03-06 NOTE — TELEPHONE ENCOUNTER
----- Message from Cassie Owens sent at 3/6/2024  1:38 PM CST -----  Needs advice from nurse:      Who Called:pt  Regarding:pt has appt coming up and needs lab orders put in in addition to the Thyroid lab work already in the system//please call pt to schedule once orders are in/she would prefer 3/18 in the morning at [facility]  Would the patient rather a call back or VIA [facility]?  Best Call Back number: [phone]  Additional Info:

## 2024-03-12 ENCOUNTER — OFFICE VISIT (OUTPATIENT)
Dept: OBSTETRICS AND GYNECOLOGY | Facility: CLINIC | Age: 79
End: 2024-03-12
Payer: MEDICARE

## 2024-03-12 VITALS
HEART RATE: 71 BPM | WEIGHT: 158.31 LBS | DIASTOLIC BLOOD PRESSURE: 86 MMHG | HEIGHT: 65 IN | SYSTOLIC BLOOD PRESSURE: 128 MMHG | BODY MASS INDEX: 26.38 KG/M2

## 2024-03-12 DIAGNOSIS — Z01.419 ENCNTR FOR GYN EXAM (GENERAL) (ROUTINE) W/O ABN FINDINGS: Primary | ICD-10-CM

## 2024-03-12 PROCEDURE — G0101 CA SCREEN;PELVIC/BREAST EXAM: HCPCS | Mod: GZ,S$PBB,, | Performed by: STUDENT IN AN ORGANIZED HEALTH CARE EDUCATION/TRAINING PROGRAM

## 2024-03-12 PROCEDURE — G0101 CA SCREEN;PELVIC/BREAST EXAM: HCPCS | Mod: PBBFAC,PO | Performed by: STUDENT IN AN ORGANIZED HEALTH CARE EDUCATION/TRAINING PROGRAM

## 2024-03-12 PROCEDURE — 99213 OFFICE O/P EST LOW 20 MIN: CPT | Mod: PBBFAC,PO,25 | Performed by: STUDENT IN AN ORGANIZED HEALTH CARE EDUCATION/TRAINING PROGRAM

## 2024-03-12 PROCEDURE — 99999 PR PBB SHADOW E&M-EST. PATIENT-LVL III: CPT | Mod: PBBFAC,,, | Performed by: STUDENT IN AN ORGANIZED HEALTH CARE EDUCATION/TRAINING PROGRAM

## 2024-03-12 NOTE — PROGRESS NOTES
Subjective:    Patient ID: Elizabeth Gleason is a 78 y.o. y.o. female.     Chief Complaint: Annual Well Woman Exam     History of Present Illness:  Elizabeth presents today for Annual Well Woman exam. She is postmenopausal. She denies pelvic pain.  She denies breast tenderness, masses, nipple discharge. She denies difficulty with urination or bowel movements. She denies menopausal symptoms such as hotflashes, vaginal dryness, and night sweats. She denies bloating, early satiety, or weight changes. She is not currently sexually active. .      Menstrual History:   No LMP recorded. Patient is postmenopausal..     OB History          1    Para   1    Term   1            AB        Living   1         SAB        IAB        Ectopic        Multiple        Live Births   1                 The following portions of the patient's history were reviewed and updated as appropriate: allergies, current medications, past family history, past medical history, past social history, past surgical history, and problem list.    ROS:   CONSTITUTIONAL: Negative for fever, chills, diaphoresis, weakness, fatigue, weight loss, weight gain  ENT: negative for sore throat, nasal congestion, nasal discharge, epistaxis, tinnitus, hearing loss  EYES: negative for blurry vision, decreased vision, loss of vision, eye pain, diplopia, photophobia, discharge  SKIN: Negative for rash, itching, hives  RESPIRATORY: negative for cough, hemoptysis, shortness of breath, pleuritic chest pain, wheezing  CARDIOVASCULAR: negative for chest pain, dyspnea on exertion, orthopnea, paroxysmal nocturnal dyspnea, edema, palpitations  BREAST: negative for breast  tenderness, breast mass, nipple discharge, or skin changes  GASTROINTESTINAL: negative for abdominal pain, flank pain, nausea, vomiting, diarrhea, constipation, black stool, blood in stool  GENITOURINARY: negative for abnormal vaginal bleeding, amenorrhea, decreased libido, dysuria, genital  sores, hematuria, incontinence, menorrhagia, pelvic pain, urinary frequency, vaginal discharge  HEMATOLOGIC/LYMPHATIC: negative for swollen lymph nodes, bleeding, bruising  MUSCULOSKELETAL: negative for back pain, joint pain, joint stiffness, joint swelling, muscle pain, muscle weakness  NEUROLOGICAL: negative for dizzy/vertigo, headache, focal weakness, numbness/tingling, speech problems, loss of consciousness, confusion, memory loss  BEHAVORIAL/PSYCH: negative for anxiety, depression, psychosis  ENDOCRINE: negative for polydipsia/polyuria, palpitations, skin changes, temperature intolerance, unexpected weight changes  ALLERGIC/IMMUNOLOGIC: negative for urticaria, hay fever, angioedema      Objective:    Vital Signs:  Vitals:    03/12/24 1536   BP: 128/86   Pulse: 71       Physical Exam:  General:  alert, cooperative, no distress   Skin:  Skin color, texture, turgor normal. No rashes or lesions   HEENT:  conjunctivae/corneas clear. PERRL.   Neck: supple, trachea midline, no adenopathy or thyromegally   Respiratory:  Normal effort   Breasts:  no discharge, erythema, tenderness, or palpable masses; no axillary lymphadenopathy   Abdomen:  soft, nontender, no palpable masses   Pelvis: External genitalia: normal general appearance  Urinary system: urethral meatus normal, bladder nontender  Vaginal: normal mucosa without prolapse or lesions  Cervix: normal appearance  Uterus: normal size, shape, position  Adnexa: non palpable   Extremities: Normal ROM; no edema, no cyanosis   Neurologial: Normal strength and tone. No focal numbness or weakness.   Psychiatric: normal mood, speech, dress, and thought processes     LABS:  No result found     A1C:  Recent Labs   Lab 09/14/23  0922   Hemoglobin A1C 5.3     CBC:  Recent Labs   Lab 05/26/23  0902 09/14/23  0922   WBC 5.93 8.35   RBC 4.48 4.27   Hemoglobin 13.7 12.8   Hematocrit 39.3 38.7   Platelets 273 365   MCV 88 91   MCH 30.6 30.0   MCHC 34.9 33.1     CMP:  Recent Labs    Lab 06/05/23  1548 09/14/23  0922   Glucose 105 114 H   Calcium 9.1 8.9   Albumin 4.4 4.3   Total Protein 7.3 7.4   Sodium 141 141   Potassium 4.1 3.7   CO2 30 H 27   Chloride 101 104   BUN 18 H 16   Creatinine 0.87 0.63   Alkaline Phosphatase 74 71   ALT 40 20   AST 29 26   Total Bilirubin 0.3 0.4     LIPIDS:  Recent Labs   Lab 03/10/23  0732 05/26/23  0902 09/14/23  0921 09/14/23  0922   TSH 0.518 0.958  --  1.650   HDL 39 L  --  36 L  --    Cholesterol 189  --  188  --    Triglycerides 246 H  --  285 H  --    LDL Cholesterol 100.8  --  95.0  --    HDL/Cholesterol Ratio 20.6  --  19.1 L  --    Non-HDL Cholesterol 150  --  152  --    Total Cholesterol/HDL Ratio 4.8  --  5.2 H  --      TSH:  Recent Labs   Lab 05/26/23  0902 09/14/23  0922   TSH 0.958 1.650       Assessment:       Healthy female exam.     1. Encntr for gyn exam (general) (routine) w/o abn findings          Plan:      Problem List Items Addressed This Visit    None  Visit Diagnoses       Encntr for gyn exam (general) (routine) w/o abn findings    -  Primary            COUNSELING:  Elizabeth was counseled on A.C.O.G. recommendations for yearly pelvic exams in addition to recommendations for monthly self breast exams; to see her PCP for other health maintenance.

## 2024-03-20 ENCOUNTER — TELEPHONE (OUTPATIENT)
Dept: FAMILY MEDICINE | Facility: CLINIC | Age: 79
End: 2024-03-20
Payer: MEDICARE

## 2024-03-20 NOTE — TELEPHONE ENCOUNTER
----- Message from Desi Rutledge sent at 3/20/2024 11:00 AM CDT -----  Type:  Needs Medical Advice    Who Called: pt  Would the patient rather a call back or a response via [facility]? call  Best Call Back Number:  [phone]  Additional Information: pt would like to see about rescheduling to another time or another day this month would like a call from office

## 2024-03-26 ENCOUNTER — TELEPHONE (OUTPATIENT)
Dept: OTOLARYNGOLOGY | Facility: CLINIC | Age: 79
End: 2024-03-26
Payer: MEDICARE

## 2024-03-26 NOTE — TELEPHONE ENCOUNTER
----- Message from Blanche Jaimes sent at 3/26/2024 12:29 PM CDT -----  Type:  Needs Medical Advice    Who Called: pt  Would the patient rather a call back or a response via [facility]? call  Best Call Back Number: [phone]   Additional Information:   Pt requesting a call regarding a copy of her ear results

## 2024-03-26 NOTE — TELEPHONE ENCOUNTER
Spoke with patient. We will give her copy of her Audiogram from 2023 tomorrow when she comes for appointment

## 2024-03-27 ENCOUNTER — OFFICE VISIT (OUTPATIENT)
Dept: FAMILY MEDICINE | Facility: CLINIC | Age: 79
End: 2024-03-27
Payer: MEDICARE

## 2024-03-27 VITALS
WEIGHT: 159.19 LBS | HEART RATE: 67 BPM | HEIGHT: 65 IN | TEMPERATURE: 98 F | BODY MASS INDEX: 26.52 KG/M2 | DIASTOLIC BLOOD PRESSURE: 82 MMHG | OXYGEN SATURATION: 98 % | SYSTOLIC BLOOD PRESSURE: 134 MMHG

## 2024-03-27 DIAGNOSIS — N32.89 BLADDER SPASMS: ICD-10-CM

## 2024-03-27 DIAGNOSIS — R73.01 IMPAIRED FASTING GLUCOSE: ICD-10-CM

## 2024-03-27 DIAGNOSIS — F41.1 GENERALIZED ANXIETY DISORDER: ICD-10-CM

## 2024-03-27 DIAGNOSIS — G44.229 CHRONIC TENSION-TYPE HEADACHE, NOT INTRACTABLE: ICD-10-CM

## 2024-03-27 DIAGNOSIS — M85.89 OSTEOPENIA OF MULTIPLE SITES: ICD-10-CM

## 2024-03-27 DIAGNOSIS — E78.2 MIXED HYPERLIPIDEMIA: ICD-10-CM

## 2024-03-27 DIAGNOSIS — I70.0 AORTIC ATHEROSCLEROSIS: ICD-10-CM

## 2024-03-27 DIAGNOSIS — E03.9 ACQUIRED HYPOTHYROIDISM: ICD-10-CM

## 2024-03-27 DIAGNOSIS — K58.0 IRRITABLE BOWEL SYNDROME WITH DIARRHEA: ICD-10-CM

## 2024-03-27 DIAGNOSIS — I65.23 BILATERAL CAROTID ARTERY STENOSIS: ICD-10-CM

## 2024-03-27 DIAGNOSIS — I10 PRIMARY HYPERTENSION: Primary | ICD-10-CM

## 2024-03-27 PROCEDURE — 99214 OFFICE O/P EST MOD 30 MIN: CPT | Mod: PBBFAC,PN | Performed by: STUDENT IN AN ORGANIZED HEALTH CARE EDUCATION/TRAINING PROGRAM

## 2024-03-27 PROCEDURE — 99999 PR PBB SHADOW E&M-EST. PATIENT-LVL IV: CPT | Mod: PBBFAC,,, | Performed by: STUDENT IN AN ORGANIZED HEALTH CARE EDUCATION/TRAINING PROGRAM

## 2024-03-27 PROCEDURE — 99215 OFFICE O/P EST HI 40 MIN: CPT | Mod: S$PBB,,, | Performed by: STUDENT IN AN ORGANIZED HEALTH CARE EDUCATION/TRAINING PROGRAM

## 2024-03-27 RX ORDER — PHENAZOPYRIDINE HYDROCHLORIDE 200 MG/1
200 TABLET, FILM COATED ORAL 3 TIMES DAILY PRN
Qty: 30 TABLET | Refills: 2 | Status: SHIPPED | OUTPATIENT
Start: 2024-03-27

## 2024-03-27 RX ORDER — DIAZEPAM 5 MG/1
5 TABLET ORAL DAILY PRN
Qty: 30 TABLET | Refills: 0 | Status: SHIPPED | OUTPATIENT
Start: 2024-03-27

## 2024-03-27 NOTE — PROGRESS NOTES
Subjective:       Patient ID: Elizabeth Gleason is a 78 y.o. female.    Chief Complaint: Follow-up (Pt here for a 6 month follow up )      Active Problem List with Overview Notes    Diagnosis Date Noted    Spastic colon 09/08/2022     Lomotil PRN   Follows with GI; Yuriy; annually       Aortic atherosclerosis 01/10/2020     Asa and statin       Mixed hyperlipidemia 03/01/2018     Controlled  pitavastatin 1mg QHS   Tolerates   Has hx of statin intolerance in past; follows with cards   Had been having some muscle pains so was holding med      Anxiety disorder 03/01/2018     lexapro 20   Valium PRN; less than weekly  1 RX lasts minimum 6 months;  review confirms   She has been having more anxiety lately with tremors, restlessness, hyperactive; has been taking more due to this  Has refilled 2x in past 3 months         Acquired hypothyroidism 11/16/2017     Synthroid 88  Asymptomatic   TSH WNL      Tension type headache 06/10/2016     lexapro 20   This has helped a lot   Not really having issues any longer      Primary hypertension      Well controlled  Losartan/HCTZ 100/12.5  Asymptomatic         Impaired fasting glucose      Diet controlled  elevated fasting glucose       Osteopenia      Ca/Vit D   strength training       Bilateral carotid artery stenosis      Follows with cards  Statin and asa          Review of Systems   All other systems reviewed and are negative.       A1C:  Recent Labs   Lab 03/10/23  0732 09/14/23  0922 03/18/24  0914   Hemoglobin A1C 5.6 5.3 5.5     CBC:  Recent Labs   Lab 05/26/23  0902 09/14/23  0922 03/18/24  0914   WBC 5.93 8.35 7.92   RBC 4.48 4.27 4.17   Hemoglobin 13.7 12.8 12.5   Hematocrit 39.3 38.7 37.8   Platelets 273 365 360   MCV 88 91 91   MCH 30.6 30.0 30.0   MCHC 34.9 33.1 33.1     CMP:  Recent Labs   Lab 06/05/23  1548 09/14/23  0922 03/18/24  0914   Glucose 105 114 H 113 H   Calcium 9.1 8.9 9.1   Albumin 4.4 4.3 4.2   Total Protein 7.3 7.4 7.4   Sodium 141 141 146 H    Potassium 4.1 3.7 4.2   CO2 30 H 27 30 H   Chloride 101 104 107   BUN 18 H 16 19 H   Creatinine 0.87 0.63 0.71   Alkaline Phosphatase 74 71 76   ALT 40 20 17   AST 29 26 26   Total Bilirubin 0.3 0.4 0.5     LIPIDS:  Recent Labs   Lab 03/10/23  0732 05/26/23  0902 09/14/23  0921 09/14/23  0922 03/18/24  0914   TSH 0.518   < >  --    < > 2.290   HDL 39 L  --  36 L  --  41   Cholesterol 189  --  188  --  224 H   Triglycerides 246 H  --  285 H  --  275 H   LDL Cholesterol 100.8  --  95.0  --  128.0   HDL/Cholesterol Ratio 20.6  --  19.1 L  --  18.3 L   Non-HDL Cholesterol 150  --  152  --  183   Total Cholesterol/HDL Ratio 4.8  --  5.2 H  --  5.5 H    < > = values in this interval not displayed.     TSH:  Recent Labs   Lab 05/26/23  0902 09/14/23  0922 03/18/24  0914   TSH 0.958 1.650 2.290        Objective:      Vitals:    03/27/24 1436   BP: 134/82   Pulse: 67   Temp: 97.5 °F (36.4 °C)      Physical Exam  Vitals reviewed.   Constitutional:       Appearance: Normal appearance. She is normal weight.   HENT:      Head: Normocephalic and atraumatic.   Eyes:      Conjunctiva/sclera: Conjunctivae normal.   Cardiovascular:      Rate and Rhythm: Normal rate and regular rhythm.      Heart sounds: Normal heart sounds.   Pulmonary:      Effort: Pulmonary effort is normal.      Breath sounds: Normal breath sounds.   Abdominal:      Palpations: Abdomen is soft.      Tenderness: There is no abdominal tenderness.   Musculoskeletal:         General: Normal range of motion.      Cervical back: Normal range of motion.      Right lower leg: No edema.      Left lower leg: No edema.   Neurological:      Mental Status: She is alert. Mental status is at baseline.   Psychiatric:         Mood and Affect: Mood normal.         Behavior: Behavior normal.          Assessment:       1. Primary hypertension    2. Mixed hyperlipidemia    3. Generalized anxiety disorder    4. Bladder spasms    5. Chronic tension-type headache, not intractable    6.  Bilateral carotid artery stenosis    7. Aortic atherosclerosis    8. Impaired fasting glucose    9. Acquired hypothyroidism    10. Irritable bowel syndrome with diarrhea    11. Osteopenia of multiple sites        Plan:   1. Primary hypertension    2. Mixed hyperlipidemia    3. Generalized anxiety disorder  - diazePAM (VALIUM) 5 MG tablet; Take 1 tablet (5 mg total) by mouth daily as needed for Anxiety.  Dispense: 30 tablet; Refill: 0    4. Bladder spasms  - phenazopyridine (PYRIDIUM) 200 MG tablet; Take 1 tablet (200 mg total) by mouth 3 (three) times daily as needed for Pain (bladder).  Dispense: 30 tablet; Refill: 2    5. Chronic tension-type headache, not intractable    6. Bilateral carotid artery stenosis    7. Aortic atherosclerosis    8. Impaired fasting glucose    9. Acquired hypothyroidism    10. Irritable bowel syndrome with diarrhea    11. Osteopenia of multiple sites       Labs reviewed in detail  Continue healthy lifestyle efforts  Continue current meds as prescribed otherwise; refills per request  Keep routine specialist f/u   RTC in 6 months  with labs prior and/or PRN        Selena Warren   [facility]   3/27/24     I spent a total of >40 minutes on the day of the visit.This includes face to face time and non-face to face time preparing to see the patient (eg, review of tests), obtaining and/or reviewing separately obtained history, documenting clinical information in the electronic or other health record, independently interpreting results and communicating results to the patient/family/caregiver, or care coordinator.

## 2024-04-15 NOTE — PROGRESS NOTES
Chief Complaint   Patient presents with    Oral Pain     Mouth ulcer        HPI 4/4/2023: Elizabeth Gleason referred to me by Dr. Selena Warren in consultation who is here for evaluation of possible allergic rhinitis. Patient's symptoms include clear rhinorrhea, cough, nasal congestion, and postnasal drip. These symptoms are perennial. Current triggers include exposure to pollens, dust, and weather changes. The patient has been suffering from these symptoms for approximately many years. The patient has tried over the counter medications with good relief of symptoms. Immunotherapy has never been tried. The patient has never had nasal polyps. The patient has no history of asthma. The patient does not suffer from frequent sinopulmonary infections. The patient has not had sinus surgery in the past. The patient has no history of eczema.  She is on Allegra.   She reports hx of right mastoidectomy with removal of her right tympanic membrane when she was 23 years old. Reports of hearing loss and tinnitus in the right ear for many years. She is also complaining pulsatile tinnitus in the left ear that has been several months. She is hoping to get hearing aid to help with her right ear.     Interval HPI 4/16/2024:  Mrs. Elizabeth Gleason is here today for evaluation of tongue ulcer. She reports the tongue ulcer and red bumps have been there for 4 months. She had tried rinsing it with salt water and mouth wash with no improvement. She reports catching herself biting her tongue. She denies tongue pain and dysphagia.   She was never a smoker. She has an appt with her dentist next month.         Past Medical History:   Diagnosis Date    ALLERGIC RHINITIS     Chronic diarrhea     GERD (gastroesophageal reflux disease)     HEARING LOSS     Hypertension     Impaired fasting glucose     Osteopenia     Tuberculosis     childhood TB     Social History     Socioeconomic History    Marital status:    Occupational History     Occupation: retired teller   Tobacco Use    Smoking status: Never    Smokeless tobacco: Never   Substance and Sexual Activity    Alcohol use: Yes     Alcohol/week: 1.0 standard drink of alcohol     Types: 1 Glasses of wine per week     Comment: maybe 1--2 monthly    Drug use: No    Sexual activity: Not Currently     Comment:    Social History Narrative    She exercises regularly.     Past Surgical History:   Procedure Laterality Date    COLONOSCOPY N/A 8/25/2017    Procedure: COLONOSCOPY;  Surgeon: Keny Lui MD;  Location: [address];  Service: Endoscopy;  Laterality: N/A;    left 5th toe surgery      rt ear surgery      rt lung lobectomy and a rib removed      secondary to TB    TUBAL LIGATION       Family History   Problem Relation Name Age of Onset    Heart disease Mother          cabg    Kidney disease Mother          was on dialysis    Stroke Father      Cancer Brother          lung cancer    No Known Problems Son      Breast cancer Cousin Pat first cousin         paternal FIRST cousin    Ovarian cancer Neg Hx      Esophageal cancer Neg Hx      Colon cancer Neg Hx             Review of Systems  General: negative for chills, fever or weight loss  Psychological: negative for mood changes or depression  Ophthalmic: negative for blurry vision, photophobia or eye pain  ENT: see HPI  Respiratory: no cough, shortness of breath, or wheezing  Cardiovascular: no chest pain or dyspnea on exertion  Gastrointestinal: no abdominal pain, change in bowel habits, or black/ bloody stools  Musculoskeletal: negative for gait disturbance or muscular weakness  Neurological: no syncope or seizures; no ataxia  Dermatological: negative for pruritis,  rash and jaundice  Hematologic/lymphatic: no easy bruising, no new adenopathy      Physical Exam:    Vitals:    04/16/24 0913   BP: (!) 178/68   Pulse: 67         Constitutional: Well appearing / communicating without difficutly.  NAD.  Eyes: EOM I  Bilaterally  Head/Face: Normocephalic.  Negative paranasal sinus pressure/tenderness.  Salivary glands WNL.  House Brackmann I Bilaterally.    Right Ear: Auricle normal appearance. External Auditory Canal within normal limits no lesions or masses, TM perforation   Left Ear: Auricle normal appearance. External Auditory Canal within normal limits no lesions or masses,TM w/o masses/lesions/perforations. TM mobility noted.  Nose: No gross nasal septal deviation. Inferior Turbinates 3+ bilaterally. No polyp/polypoid nasal mucosa noted. No septal perforation. No masses/lesions. External nasal skin appears normal without masses/lesions.  Oral Cavity: Gingiva/lips within normal limits.  Dentition/gingiva healthy appearing. Mucus membranes moist. Floor of mouth soft, no masses palpated. Oral Tongue mobile. Hard Palate appears normal.    Oropharynx: Base of tongue appears normal. +right lateral side of the tongue with white ulcer that runs along her teeth. No redness, edema, or tenderness with palpation. No masses/lesions noted. Tonsillar fossa/pharyngeal wall without lesions. Posterior oropharynx WNL.  Soft palate without masses. Midline uvula.   Neck/Lymphatic: No LAD I-VI bilaterally.  No thyromegaly.  No masses noted on exam.    Mirror laryngoscopy/nasopharyngoscopy: Active gag reflex.  Unable to perform.    Neuro/Psychiatric: AOx3.  Normal mood and affect.   Cardiovascular: Normal carotid pulses bilaterally, no increasing jugular venous distention noted at cervical region bilaterally.    Respiratory: Normal respiratory effort, no stridor, no retractions noted.        Assessment:    ICD-10-CM ICD-9-CM    1. Tongue ulcer  K14.0 529.0 Ambulatory referral/consult to ENT          The encounter diagnosis was Tongue ulcer.      Plan:    -I will referral patient to ENT for possible tongue biopsy   -I suspect the tongue lesion is from her biting down on it. I recommended following up with her dentist.   -she denies pain with  ulcer and does not need Kenalog cream    Page Barajas, NP

## 2024-04-16 ENCOUNTER — OFFICE VISIT (OUTPATIENT)
Dept: OTOLARYNGOLOGY | Facility: CLINIC | Age: 79
End: 2024-04-16
Payer: MEDICARE

## 2024-04-16 VITALS
DIASTOLIC BLOOD PRESSURE: 68 MMHG | WEIGHT: 160.25 LBS | SYSTOLIC BLOOD PRESSURE: 178 MMHG | HEART RATE: 67 BPM | BODY MASS INDEX: 26.67 KG/M2

## 2024-04-16 DIAGNOSIS — K14.0 TONGUE ULCER: Primary | ICD-10-CM

## 2024-04-16 PROCEDURE — 99213 OFFICE O/P EST LOW 20 MIN: CPT | Mod: S$PBB,,, | Performed by: NURSE PRACTITIONER

## 2024-04-16 PROCEDURE — 99999 PR PBB SHADOW E&M-EST. PATIENT-LVL IV: CPT | Mod: PBBFAC,,, | Performed by: NURSE PRACTITIONER

## 2024-04-16 PROCEDURE — 99214 OFFICE O/P EST MOD 30 MIN: CPT | Mod: PBBFAC,PN | Performed by: NURSE PRACTITIONER

## 2024-04-23 ENCOUNTER — OFFICE VISIT (OUTPATIENT)
Dept: OTOLARYNGOLOGY | Facility: CLINIC | Age: 79
End: 2024-04-23
Payer: MEDICARE

## 2024-04-23 VITALS
HEART RATE: 60 BPM | WEIGHT: 157.63 LBS | DIASTOLIC BLOOD PRESSURE: 65 MMHG | SYSTOLIC BLOOD PRESSURE: 166 MMHG | BODY MASS INDEX: 26.23 KG/M2

## 2024-04-23 DIAGNOSIS — H91.8X3 ASYMMETRICAL HEARING LOSS: Chronic | ICD-10-CM

## 2024-04-23 DIAGNOSIS — K14.0 TONGUE ULCER: Primary | Chronic | ICD-10-CM

## 2024-04-23 DIAGNOSIS — J30.9 CHRONIC ALLERGIC RHINITIS: Chronic | ICD-10-CM

## 2024-04-23 DIAGNOSIS — H93.13 TINNITUS, BILATERAL: Chronic | ICD-10-CM

## 2024-04-23 DIAGNOSIS — H90.A31 MIXED CONDUCTIVE AND SENSORINEURAL HEARING LOSS OF RIGHT EAR WITH RESTRICTED HEARING OF LEFT EAR: ICD-10-CM

## 2024-04-23 PROCEDURE — 99999 PR PBB SHADOW E&M-EST. PATIENT-LVL IV: CPT | Mod: PBBFAC,,, | Performed by: OTOLARYNGOLOGY

## 2024-04-23 PROCEDURE — 99214 OFFICE O/P EST MOD 30 MIN: CPT | Mod: S$PBB,,, | Performed by: OTOLARYNGOLOGY

## 2024-04-23 PROCEDURE — 99214 OFFICE O/P EST MOD 30 MIN: CPT | Mod: PBBFAC,PN | Performed by: OTOLARYNGOLOGY

## 2024-04-23 RX ORDER — TRIAMCINOLONE ACETONIDE 1 MG/G
PASTE DENTAL 2 TIMES DAILY
Qty: 5 G | Refills: 3 | Status: SHIPPED | OUTPATIENT
Start: 2024-04-23 | End: 2024-05-23

## 2024-04-23 NOTE — PROGRESS NOTES
Chief Complaint   Patient presents with    Mouth Lesions     Right Side Tongue       HPI:  Patient is a 78 y.o. female who has previously seen Page Barajas NP for hearing loss, chronic rhinitis.  She has history of mastoidectomy at the age of 23 on the right ear and chronic hearing loss in that ear since that time.  She was noted by the nurse practitioner to have an area on the right lateral and posterior tongue with chronic irritation.  It had been seen by her dentist and was thought to be due to repetitive trauma with the teeth nearby.  The area has improved overall since initially evaluated, but there are 2 small areas at the right lateral posterior tongue that have persistent lesion and she is here to have these evaluated.  She does not report any pain in the area, and she does feel that the area actually has begun to improve over the last few weeks.  She has not utilized any treatment with oral paste or mouth rinses.        Active Ambulatory Problems     Diagnosis Date Noted    Primary hypertension     Impaired fasting glucose     Osteopenia     Bilateral carotid artery stenosis     Tension type headache 06/10/2016    Acquired hypothyroidism 11/16/2017    Mixed hyperlipidemia 03/01/2018    Anxiety disorder 03/01/2018    Aortic atherosclerosis 01/10/2020    Spastic colon 09/08/2022     Resolved Ambulatory Problems     Diagnosis Date Noted    UTI (lower urinary tract infection) 03/07/2013    Repetitive strain injury of lower back 05/27/2014    Screening for colon cancer 08/25/2017    Postmenopausal state 09/22/2020    Statin intolerance 03/08/2021    COVID-19 virus infection 05/26/2023    Dehydration 05/26/2023    Hypokalemia 05/26/2023    Generalized weakness 05/26/2023    Urinary retention 05/26/2023     Past Medical History:   Diagnosis Date    ALLERGIC RHINITIS     Chronic diarrhea     GERD (gastroesophageal reflux disease)     HEARING LOSS     Hypertension     Tuberculosis        Review of Systems  General:  negative for chills, fever or weight loss  Psychological: negative for mood changes or depression  Ophthalmic: negative for blurry vision, photophobia or eye pain  ENT: see HPI  Respiratory: no cough, shortness of breath, or wheezing  Cardiovascular: no chest pain or dyspnea on exertion  Gastrointestinal: no abdominal pain, change in bowel habits, or black/ bloody stools  Musculoskeletal: negative for gait disturbance or muscular weakness  Neurological: no syncope or seizures; no ataxia  Dermatological: negative for pruritis, rash and jaundice  Hematologic/lymphatic: no easy bruising, no new adenopathy    Physical Exam     Vitals:    04/23/24 1056   BP: (!) 166/65   Pulse: 60         Constitutional:   She is oriented to person, place, and time. Vital signs are normal. She appears well-developed and well-nourished. She appears alert. She is cooperative. Normal speech.      Head:  Normocephalic and atraumatic. Salivary glands normal.  Facial strength is normal.      Ears:    Left Ear: Tympanic membrane is not retracted.  No middle ear effusion.   Right TM abnormal, no middle ear ossicles or landmarks visible.    Nose:  Mucosal edema present. No rhinorrhea, septal deviation or polyps. Turbinates abnormal and turbinate hypertrophy (3+, boggy, congested mucosa).  Right sinus exhibits no maxillary sinus tenderness and no frontal sinus tenderness. Left sinus exhibits no maxillary sinus tenderness and no frontal sinus tenderness.     Mouth/Throat  Oropharynx clear and moist without lesions or asymmetry, lips, teeth, and gums normal and oropharynx normal. No oropharyngeal exudate, posterior oropharyngeal edema or posterior oropharyngeal erythema. Mirror exam not performed due to patient tolerance.  Mirror exam not performed due to patient tolerance.          Neck:  Neck normal without thyromegaly masses, asymmetry, normal tracheal structure, crepitus, and tenderness, thyroid normal, trachea normal, phonation normal, full  range of motion with neck supple and no adenopathy. No JVD present. Carotid bruit is not present. No thyroid mass and no thyromegaly present.     She has no cervical adenopathy.     Cardiovascular:    Normal rate, regular rhythm and rate and rhythm, heart sounds, and pulses normal.              Pulmonary/Chest:   Effort and breath sounds normal.     Psychiatric:   She has a normal mood and affect. Her speech is normal and behavior is normal.     Neurological:   She is alert and oriented to person, place, and time. She has neurological normal, alert and oriented. No cranial nerve deficit.     Skin:   No abrasions, lacerations, lesions, or rashes.           Assessment/Plan:      ICD-10-CM ICD-9-CM    1. Tongue ulcer  K14.0 529.0 Ambulatory referral/consult to ENT      triamcinolone acetonide 0.1% (KENALOG) 0.1 % paste      2. Asymmetrical hearing loss  H91.8X3 389.9       3. Mixed conductive and sensorineural hearing loss of right ear with restricted hearing of left ear  H90.A31 389.22       4. Tinnitus, bilateral  H93.13 388.30       5. Chronic allergic rhinitis  J30.9 477.9             Patient was given Kenalog in Orabase paste to apply to the tongue lesion b.i.d..    We will reassess response to therapy and decide upon biopsy at her follow-up in the next 2-3 weeks.    We will follow-up on her hearing loss and decide about further interventions or hearing aids if needed.    Continue nasal sprays for control of chronic rhinitis.    Doreen Arizmendi MD  [facility]

## 2024-04-23 NOTE — PATIENT INSTRUCTIONS
Start using azelastine every day for control of nasal drip symptoms.      Kenalog paste for right tongue lesion.  Will recheck area in a couple weeks and plan to biopsy at that time if not improving.

## 2024-05-13 ENCOUNTER — TELEPHONE (OUTPATIENT)
Dept: OTOLARYNGOLOGY | Facility: CLINIC | Age: 79
End: 2024-05-13
Payer: MEDICARE

## 2024-05-13 NOTE — TELEPHONE ENCOUNTER
Spoke with patient and was able to schedule her for May 2st at 1:00pm  ----- Message from Stephanie Payne sent at 5/13/2024  7:08 AM CDT -----  Regarding: Call back  Contact: [phone]  Who Called: PT     Patient is calling to reschedule her appointment due to a family emergency. Please advice

## 2024-05-17 ENCOUNTER — TELEPHONE (OUTPATIENT)
Dept: FAMILY MEDICINE | Facility: CLINIC | Age: 79
End: 2024-05-17
Payer: MEDICARE

## 2024-05-17 DIAGNOSIS — R42 DIZZINESS: Primary | ICD-10-CM

## 2024-05-17 DIAGNOSIS — I65.23 BILATERAL CAROTID ARTERY STENOSIS: ICD-10-CM

## 2024-05-17 NOTE — TELEPHONE ENCOUNTER
----- Message from Raquel Mason sent at 5/17/2024  9:35 AM CDT -----  Regarding: call  Type:  Needs Medical Advice    Who Called: pt  Would the patient rather a call back or a response via [facility]? Call  Best Call Back Number: [phone]  Additional Information: pt also stated she has been having light dizzies off and on

## 2024-05-17 NOTE — TELEPHONE ENCOUNTER
Called and spoke with pt in regards of US order being placed. Pt verbalized understanding of message.

## 2024-05-17 NOTE — TELEPHONE ENCOUNTER
Called and spoke with pt in regards of her message. Pt called in regards of wanting to get testing done of her Carotid ultrasound. Pt informed me that she has a family history of family members having strokes, and she would like top get checked out to make sure that all is well with her. Please advise patient message.

## 2024-05-17 NOTE — TELEPHONE ENCOUNTER
----- Message from Raquel Mason sent at 5/17/2024  9:34 AM CDT -----  Regarding: call  Type:  Needs Medical Advice    Who Called: pt  Would the patient rather a call back or a response via [facility]? Call  Best Call Back Number: [phone]  Additional Information: pt would like to speak with  in regards to a test for her neck

## 2024-05-20 ENCOUNTER — TELEPHONE (OUTPATIENT)
Dept: OTOLARYNGOLOGY | Facility: CLINIC | Age: 79
End: 2024-05-20
Payer: MEDICARE

## 2024-05-20 NOTE — TELEPHONE ENCOUNTER
Spoke with patient wrong department call back request  ----- Message from Blanche Jaimes sent at 5/20/2024  8:24 AM CDT -----  Type:  Needs Medical Advice    Who Called: pt  Would the patient rather a call back or a response via [facility]? call  Best Call Back Number: [phone]   Additional Information:   Pt requesting a call back from earlier because the call was disconnected

## 2024-05-21 ENCOUNTER — PROCEDURE VISIT (OUTPATIENT)
Dept: OTOLARYNGOLOGY | Facility: CLINIC | Age: 79
End: 2024-05-21
Payer: MEDICARE

## 2024-05-21 VITALS
SYSTOLIC BLOOD PRESSURE: 173 MMHG | DIASTOLIC BLOOD PRESSURE: 65 MMHG | WEIGHT: 160.38 LBS | BODY MASS INDEX: 26.69 KG/M2 | HEART RATE: 58 BPM

## 2024-05-21 DIAGNOSIS — K14.8 TONGUE LESION: Primary | Chronic | ICD-10-CM

## 2024-05-21 PROCEDURE — 88305 TISSUE EXAM BY PATHOLOGIST: CPT | Performed by: STUDENT IN AN ORGANIZED HEALTH CARE EDUCATION/TRAINING PROGRAM

## 2024-05-21 PROCEDURE — 41105 BIOPSY OF TONGUE: CPT | Mod: S$PBB,,, | Performed by: OTOLARYNGOLOGY

## 2024-05-21 PROCEDURE — 41105 BIOPSY OF TONGUE: CPT | Mod: PBBFAC,PN | Performed by: OTOLARYNGOLOGY

## 2024-05-21 PROCEDURE — 88305 TISSUE EXAM BY PATHOLOGIST: CPT | Mod: 26,,, | Performed by: STUDENT IN AN ORGANIZED HEALTH CARE EDUCATION/TRAINING PROGRAM

## 2024-05-21 NOTE — PROCEDURES
Preprocedure diagnosis:    Right posterior tongue lesion  Postproecdure diagnosis:   Same    Procedure:   tongue biopsy    Complications:  None  Specimen:   right posterolateral tongue lesion  Blood loss:   Minimal  Anesthesia:  Local, lidocaine    Procedure in detail:  After appropriate consents were obtained, the right lateral tongue was anesthetized using 0.5 cc of 1% lidocaine with epinephrine in the area of the right tongue lesion (shown below).  A   Forceps and scissors were used to grasp the lesion and excise it at the base taking a cuff of surrounding tissue.    Pressure was held over the area for hemostasis and silver nitrate was used for additional hemostasis in the area.    Bleeding was controlled at the end of the procedure. The patient tolerated the procedure without difficulty, and there were no complications.        Mouth/Throat         1-2 mm raised, inflammatory appearing pedunculated lesion.        Elizabeth was seen today for oral pain.    Diagnoses and all orders for this visit:    Tongue lesion  -     Cancel: Specimen to Pathology ENT  -     Specimen to Pathology ENT        Patient will follow-up with me in 2-3 weeks to reassess the biopsied area.    I will follow-up on pathology results when they are available.    Doreen Arizmendi MD  [facility]    This note was created by combination of typed  and MModal dictation.  Transcription errors may be present.  If there are any questions, please contact me.

## 2024-05-21 NOTE — PATIENT INSTRUCTIONS
Hydrate with plenty of fluids.   Use tylenol or motrin for any pain.     Will call with biopsy results when available.

## 2024-05-22 ENCOUNTER — TELEPHONE (OUTPATIENT)
Dept: GASTROENTEROLOGY | Facility: CLINIC | Age: 79
End: 2024-05-22
Payer: MEDICARE

## 2024-05-22 ENCOUNTER — HOSPITAL ENCOUNTER (OUTPATIENT)
Dept: RADIOLOGY | Facility: HOSPITAL | Age: 79
Discharge: HOME | End: 2024-05-22
Attending: STUDENT IN AN ORGANIZED HEALTH CARE EDUCATION/TRAINING PROGRAM
Payer: MEDICARE

## 2024-05-22 DIAGNOSIS — I65.23 BILATERAL CAROTID ARTERY STENOSIS: ICD-10-CM

## 2024-05-22 DIAGNOSIS — R42 DIZZINESS: ICD-10-CM

## 2024-05-22 PROCEDURE — 93880 EXTRACRANIAL BILAT STUDY: CPT | Mod: TC

## 2024-05-22 PROCEDURE — 93880 EXTRACRANIAL BILAT STUDY: CPT | Mod: 26,,, | Performed by: RADIOLOGY

## 2024-05-22 NOTE — TELEPHONE ENCOUNTER
----- Message from Khushboo Owens sent at 5/22/2024  3:04 PM CDT -----  Regarding: Pt called regarding refills on below    Can the clinic reply in [facility]:        Please refill the medication(s) listed below. Please call the patient when the prescription(s) is ready for  at this phone number   Pt called states she needs refills on the below Rx. Please advise         Medication #1 diphenoxylate-atropine 2.5-0.025 mg (LOMOTIL) 2.5-0.025 mg per tablet      Medication #2          Preferred Pharmacy:   [facility] - [address]  [address] [address]  Phone: [phone] Fax: [phone]

## 2024-05-23 DIAGNOSIS — K52.9 CHRONIC DIARRHEA: ICD-10-CM

## 2024-05-23 RX ORDER — DIPHENOXYLATE HYDROCHLORIDE AND ATROPINE SULFATE 2.5; .025 MG/1; MG/1
1 TABLET ORAL 4 TIMES DAILY PRN
Qty: 120 TABLET | Refills: 3 | Status: SHIPPED | OUTPATIENT
Start: 2024-05-23

## 2024-05-29 LAB
FINAL PATHOLOGIC DIAGNOSIS: NORMAL
GROSS: NORMAL
Lab: NORMAL
MICROSCOPIC EXAM: NORMAL

## 2024-05-30 ENCOUNTER — TELEPHONE (OUTPATIENT)
Dept: OTOLARYNGOLOGY | Facility: CLINIC | Age: 79
End: 2024-05-30
Payer: MEDICARE

## 2024-05-30 NOTE — PROGRESS NOTES
Pathology results for tongue biopsy reviewed.    Traumatic fibroma- chronic inflammation.   NO evidence of dysplasia or neoplasm.     Doreen Arizmendi MD  [facility]

## 2024-05-30 NOTE — TELEPHONE ENCOUNTER
Spoke with patient and informed her that  will continue with results at visit.  ----- Message from Doreen Arizmendi MD sent at 5/30/2024  6:57 AM CDT -----  Pathology results for tongue biopsy reviewed.    Traumatic fibroma- chronic inflammation.   NO evidence of dysplasia or neoplasm.     Doreen Arizmendi MD  [facility]

## 2024-06-03 ENCOUNTER — TELEPHONE (OUTPATIENT)
Dept: FAMILY MEDICINE | Facility: CLINIC | Age: 79
End: 2024-06-03
Payer: MEDICARE

## 2024-06-03 DIAGNOSIS — E04.1 THYROID NODULE: Primary | ICD-10-CM

## 2024-06-03 NOTE — TELEPHONE ENCOUNTER
----- Message from Temitope Gupta sent at 6/3/2024  1:51 PM CDT -----  Type:  Test Results    Who Called:  Pt   Name of Test (Lab/Mammo/Etc):  Ultrasound  Date of Test: 05/22  Ordering Provider: Clemencia  Where the test was performed:  [facility]   Would the patient rather a call back or a response via [facility]? Call  Best Call Back Number:  [phone]  Additional Information:

## 2024-06-03 NOTE — TELEPHONE ENCOUNTER
Called and spoke with pt in regards of her message. Pt called in regards of her recent US Carotid results. Please advise patient message.

## 2024-06-04 ENCOUNTER — TELEPHONE (OUTPATIENT)
Dept: FAMILY MEDICINE | Facility: CLINIC | Age: 79
End: 2024-06-04
Payer: MEDICARE

## 2024-06-04 NOTE — TELEPHONE ENCOUNTER
----- Message from Missy Caro sent at 6/4/2024 11:11 AM CDT -----  Type:  Test Results    Who Called: pt   Name of Test (Lab/Mammo/Etc):    Date of Test: 05/22  Ordering Provider: Briana   Where the test was performed: [facility]   Would the patient rather a call back or a response via [facility]? Call   Best Call Back Number:  [phone]  Additional Information:

## 2024-06-04 NOTE — TELEPHONE ENCOUNTER
1-39% stenosis at bilateral carotid bifurcations. Low risk. No follow up needed.      Incidental note is made of bilateral thyroid nodules.  Recommend further evaluation with dedicated thyroid ultrasound. Order for thyroid US placed.

## 2024-06-04 NOTE — TELEPHONE ENCOUNTER
Spoke with pt in regards of her ultrasound results.  Pt verbalized understanding of message of her results, and why she is needing a thyroid ultrasound as well.

## 2024-06-04 NOTE — TELEPHONE ENCOUNTER
----- Message from Kathy Summers sent at 6/4/2024  1:14 PM CDT -----  Regarding: results of test  When calling Patient to schedule US Thyroid, Patient have questions on why she needs this done.  She also stated that she would like her results from the US Carotid Bilat that she did on 05/22/24. Patient requested to be called back ASA ,stating that she has called several times but no one has returned her call.      Thank you   Unclear etiology. No red flag features. History of somatic complaints previously which are not in correlation to objective evidence. N/V may be secondary to amiodarone. She was recently initiated with IV amio then amio 400 mg BID x 13 days at discharge. She underwent PVI 3/29 with difficult to control atrial arrhythmias. Intolerant to flecainide (abd pain and constipation). TSH very mildly elevated with normal free T4 (subclinical hypothyroidism). US abd unrevealing. CMP unrevealing.   - Decrease amiodarone dose to 200 mg BID from 400 mg BID. She has 44 tablets left to take over the next 22 days. She will then go to 200 mg daily  - Continue to monitor thyroid function

## 2024-06-05 DIAGNOSIS — I10 PRIMARY HYPERTENSION: ICD-10-CM

## 2024-06-05 RX ORDER — LOSARTAN POTASSIUM AND HYDROCHLOROTHIAZIDE 12.5; 1 MG/1; MG/1
1 TABLET ORAL DAILY
Qty: 90 TABLET | Refills: 3 | Status: SHIPPED | OUTPATIENT
Start: 2024-06-05 | End: 2025-06-05

## 2024-06-05 NOTE — TELEPHONE ENCOUNTER
----- Message from Raquel Mason sent at 6/5/2024 10:23 AM CDT -----  Regarding: refill  Type:  RX Refill Request    Who Called: pt  Refill or New Rx:refill  RX Name and Strength:losartan-hydrochlorothiazide 100-12.5 mg (HYZAAR) 100-12.5 mg Tab  Glosartan 100mg ( not listed )  EScitalopram oxalate (LEXAPRO) 20 MG tablet  Preferred Pharmacy with phone number:[facility] - [address] [facility]  [address] [facility] [address] [address] [place] [address]  Phone: [phone] Fax: [phone]  Local or Mail Order:Local  Ordering Provider:Champagne  Would the patient rather a call back or a response via [facility]? Call  Best Call Back Number:[phone]  Additional Information: call pt for more info

## 2024-06-05 NOTE — TELEPHONE ENCOUNTER
No care due was identified.  [facility] Embedded Care Due Messages. Reference number: [id number].   6/05/2024 11:12:32 AM CDT

## 2024-06-06 ENCOUNTER — OFFICE VISIT (OUTPATIENT)
Dept: OTOLARYNGOLOGY | Facility: CLINIC | Age: 79
End: 2024-06-06
Payer: MEDICARE

## 2024-06-06 VITALS
HEART RATE: 57 BPM | DIASTOLIC BLOOD PRESSURE: 65 MMHG | BODY MASS INDEX: 26.69 KG/M2 | SYSTOLIC BLOOD PRESSURE: 148 MMHG | WEIGHT: 160.38 LBS

## 2024-06-06 DIAGNOSIS — K14.8 TONGUE LESION: Primary | ICD-10-CM

## 2024-06-06 DIAGNOSIS — F41.1 GENERALIZED ANXIETY DISORDER: ICD-10-CM

## 2024-06-06 DIAGNOSIS — H90.A31 MIXED CONDUCTIVE AND SENSORINEURAL HEARING LOSS OF RIGHT EAR WITH RESTRICTED HEARING OF LEFT EAR: Chronic | ICD-10-CM

## 2024-06-06 DIAGNOSIS — H90.3 SENSORINEURAL HEARING LOSS (SNHL) OF BOTH EARS: Chronic | ICD-10-CM

## 2024-06-06 DIAGNOSIS — Z90.89 H/O MASTOIDECTOMY: ICD-10-CM

## 2024-06-06 DIAGNOSIS — E04.1 THYROID NODULE: Chronic | ICD-10-CM

## 2024-06-06 PROCEDURE — 99213 OFFICE O/P EST LOW 20 MIN: CPT | Mod: PBBFAC,PN | Performed by: OTOLARYNGOLOGY

## 2024-06-06 PROCEDURE — 99214 OFFICE O/P EST MOD 30 MIN: CPT | Mod: S$PBB,,, | Performed by: OTOLARYNGOLOGY

## 2024-06-06 PROCEDURE — 99999 PR PBB SHADOW E&M-EST. PATIENT-LVL III: CPT | Mod: PBBFAC,,, | Performed by: OTOLARYNGOLOGY

## 2024-06-06 RX ORDER — ESCITALOPRAM OXALATE 20 MG/1
20 TABLET ORAL NIGHTLY
Qty: 90 TABLET | Refills: 3 | Status: SHIPPED | OUTPATIENT
Start: 2024-06-06

## 2024-06-06 NOTE — Clinical Note
Dr. Rhodes, I reviewed this patient's recent ultrasound, but there was no comment in the report on TiRADs scale to assess for whether either of these nodules meet criteria for FNA.  Given her longstanding history of hypothyroidism, hopefully they do not need more workup, but I just wanted to clarify this and let the patient know.   Thanks, Doreen Arizmendi MD found down

## 2024-06-06 NOTE — TELEPHONE ENCOUNTER
Called and spoke with pt in regards of her message. Pt called to get a med refill on her Lexapro. Please advise patient message. Also informed pt that her BP med was refilled on yesterday and was sent to [facility]. Pt verbalized understanding of message.

## 2024-06-06 NOTE — PATIENT INSTRUCTIONS
I will clarify the Ultrasound findings of the thyroid nodules with radiology, to ensure there are not worrisome features to the nodules mentioned.   Repeat ultrasound as needed based on these results.     Needs annual audiogram, and can discuss hearing aids with audiologist at that time.

## 2024-06-06 NOTE — TELEPHONE ENCOUNTER
----- Message from Reji Mckeon sent at 6/6/2024 12:44 PM CDT -----  Contact: pt  Type: Requesting to speak with nurse and refill         Who Called: PT  Regarding: EScitalopram oxalate (LEXAPRO) 20 MG tablet 90 tablet 3 6/5/2023 - No  Sig - Route: Take 1 tablet (20 mg total) by mouth every evening. - Oral  Sent to pharmacy as: EScitalopram oxalate (LEXAPRO) 20 MG tablet      Would the patient rather a call back or a response via [facility]? Call back  Best Call Back Number:  [phone]  Additional Information: [facility] - [address] [facility]   Phone: [phone]  Fax: [phone]

## 2024-06-06 NOTE — TELEPHONE ENCOUNTER
----- Message from Ellen Leonard sent at 6/6/2024 12:51 PM CDT -----  Type:  Needs Medical Advice    Who Called: Pt   Symptoms (please be specific): checking status of prescription for BP and pain for head  Would the patient rather a call back or a response via [facility]? Call back   Best Call Back Number: [phone]  Additional Information: Please be advised, pt states that she was checking status of prescriptions that were supposed to be sent over yesterday to [facility], pt couldn't recall names of medications and said if she could have a call back

## 2024-06-06 NOTE — PROGRESS NOTES
Chief Complaint   Patient presents with    Follow-up     Improvement No Problems        HPI:  Patient is a 78 y.o. female who has previously seen Page Barajas NP for hearing loss, chronic rhinitis.  She has history of mastoidectomy at the age of 23 on the right ear and chronic hearing loss in that ear since that time.  She was noted by the nurse practitioner to have an area on the right lateral and posterior tongue with chronic irritation.  It had been seen by her dentist and was thought to be due to repetitive trauma with the teeth nearby.  The area has improved overall since initially evaluated, but there are 2 small areas at the right lateral posterior tongue that have persistent lesion and she is here to have these evaluated.  She does not report any pain in the area, and she does feel that the area actually has begun to improve over the last few weeks.  She has not utilized any treatment with oral paste or mouth rinses.      Interval HPI 6/6/2024:    Patient reports that she healed well from the tongue biopsy and has no further issues in that area.    She recently had a carotid ultrasound that noted 2 nodules in the thyroid.  Follow-up ultrasound report below.    Patient has been on levothyroxine for many years for hypothyroidism.  No previous thyroid surgery.  No family history of thyroid cancer, but she does have female relatives with thyroid nodules.     She has not had any symptoms of heat or cold intolerance, palpitations, weight loss or gain, significant fatigue, or changes in hair, skin, nails.   Patient also has history of right mastoidectomy at a young age.  She has had poor hearing in that ear since that time.  She has not ever worn hearing aids.  She does not report any significant issues with hearing on the left, but does feel that the right ear has worsened over the last few years.  Her most recent audiogram was in 2023      US THYROID-  findings discussed with the patient today     CLINICAL  HISTORY:  Nontoxic single thyroid nodule     TECHNIQUE:  Ultrasound of the thyroid and cervical lymph nodes was performed.     FINDINGS:  The right and left lobes of the thyroid measure 5.3 x 3.4 x 2.6 cm and 4.6 x 3 x 1.9 cm respectively.  There is a 1.9 cm nodule within the right lobe and there is a 1.3 cm nodule within the left lobe.  There are no enlarged cervical lymph nodes.     Impression:  Bilateral thyroid nodules.          Active Ambulatory Problems     Diagnosis Date Noted    Primary hypertension     Impaired fasting glucose     Osteopenia     Bilateral carotid artery stenosis     Tension type headache 06/10/2016    Acquired hypothyroidism 11/16/2017    Mixed hyperlipidemia 03/01/2018    Anxiety disorder 03/01/2018    Aortic atherosclerosis 01/10/2020    Spastic colon 09/08/2022     Resolved Ambulatory Problems     Diagnosis Date Noted    UTI (lower urinary tract infection) 03/07/2013    Repetitive strain injury of lower back 05/27/2014    Screening for colon cancer 08/25/2017    Postmenopausal state 09/22/2020    Statin intolerance 03/08/2021    COVID-19 virus infection 05/26/2023    Dehydration 05/26/2023    Hypokalemia 05/26/2023    Generalized weakness 05/26/2023    Urinary retention 05/26/2023     Past Medical History:   Diagnosis Date    ALLERGIC RHINITIS     Chronic diarrhea     GERD (gastroesophageal reflux disease)     HEARING LOSS     Hypertension     Tuberculosis        Review of Systems  General: negative for chills, fever or weight loss  Psychological: negative for mood changes or depression  Ophthalmic: negative for blurry vision, photophobia or eye pain  ENT: see HPI  Respiratory: no cough, shortness of breath, or wheezing  Cardiovascular: no chest pain or dyspnea on exertion  Gastrointestinal: no abdominal pain, change in bowel habits, or black/ bloody stools  Musculoskeletal: negative for gait disturbance or muscular weakness  Neurological: no syncope or seizures; no  ataxia  Dermatological: negative for pruritis, rash and jaundice  Hematologic/lymphatic: no easy bruising, no new adenopathy    Physical Exam     Vitals:    06/06/24 1259   BP: (!) 148/65   Pulse: (!) 57         Constitutional:   She is oriented to person, place, and time. Vital signs are normal. She appears well-developed and well-nourished. She appears alert. She is cooperative. Normal speech.      Head:  Normocephalic and atraumatic. Salivary glands normal.  Facial strength is normal.      Ears:    Left Ear: Tympanic membrane is not retracted.  No middle ear effusion.   Right TM abnormal, no middle ear ossicles or landmarks visible.    Retracted down onto middle ear space    Nose:  Mucosal edema present. No rhinorrhea, septal deviation or polyps. Turbinates abnormal and turbinate hypertrophy (3+, boggy, congested mucosa).  Right sinus exhibits no maxillary sinus tenderness and no frontal sinus tenderness. Left sinus exhibits no maxillary sinus tenderness and no frontal sinus tenderness.     Mouth/Throat  Oropharynx clear and moist without lesions or asymmetry, normal uvula midline, lips, teeth, and gums normal and oropharynx normal. No oral lesions, trismus or mucous membrane lesions. No oropharyngeal exudate, posterior oropharyngeal edema or posterior oropharyngeal erythema. Mirror exam not performed due to patient tolerance.  Mirror exam not performed due to patient tolerance.      Neck:  Neck normal without thyromegaly masses, asymmetry, normal tracheal structure, crepitus, and tenderness, trachea normal, phonation normal, full range of motion with neck supple and no adenopathy. No JVD present. Carotid bruit is not present. No thyroid mass and no thyromegaly present.     She has no cervical adenopathy.     Cardiovascular:    Normal rate, regular rhythm and rate and rhythm, heart sounds, and pulses normal.              Pulmonary/Chest:   Effort and breath sounds normal.     Psychiatric:   She has a normal mood  and affect. Her speech is normal and behavior is normal.     Neurological:   She is alert and oriented to person, place, and time. She has neurological normal, alert and oriented. No cranial nerve deficit.     Skin:   No abrasions, lacerations, lesions, or rashes.     Audiogram from 2023: Interpreted by me and reviewed with the patient today.   Profound mixed hearing loss right.  Mild to moderate sensorineural hearing loss high frequencies left.  Tympanograms type A left, type B on right          Assessment/Plan:      ICD-10-CM ICD-9-CM    1. Tongue lesion  K14.8 529.8       2. Mixed conductive and sensorineural hearing loss of right ear with restricted hearing of left ear  H90.A31 389.22       3. Sensorineural hearing loss (SNHL) of both ears  H90.3 389.18       4. H/O mastoidectomy  Z90.89 V45.79       5. Thyroid nodule  E04.1 241.0            Patient will have annual audiogram and discuss any possibility of hearing aids with our audiologist.     I will review the findings of the recent ultrasound with the radiologist to assess for any worrisome findings on TiRADs scale, to determine if biopsy is needed.     She will follow-up with me in 1 year for hearing test, and I will contact her if there are any findings on thyroid ultrasound that indicate need for further workup.    Doreen Arizmendi MD  [facility]

## 2024-06-06 NOTE — TELEPHONE ENCOUNTER
No care due was identified.  [facility] Embedded Care Due Messages. Reference number: [id number].   6/06/2024 1:22:53 PM CDT

## 2024-08-05 DIAGNOSIS — E78.2 MIXED HYPERLIPIDEMIA: ICD-10-CM

## 2024-08-05 DIAGNOSIS — F41.1 GENERALIZED ANXIETY DISORDER: ICD-10-CM

## 2024-08-06 RX ORDER — DIAZEPAM 5 MG/1
5 TABLET ORAL DAILY PRN
Qty: 30 TABLET | Refills: 0 | Status: SHIPPED | OUTPATIENT
Start: 2024-08-06

## 2024-08-06 RX ORDER — PITAVASTATIN CALCIUM 1.04 MG/1
1 TABLET, FILM COATED ORAL NIGHTLY
Qty: 90 TABLET | Refills: 3 | Status: SHIPPED | OUTPATIENT
Start: 2024-08-06 | End: 2025-08-06

## 2024-10-01 ENCOUNTER — TELEPHONE (OUTPATIENT)
Dept: GASTROENTEROLOGY | Facility: CLINIC | Age: 79
End: 2024-10-01
Payer: MEDICARE

## 2024-10-01 DIAGNOSIS — K52.9 CHRONIC DIARRHEA: ICD-10-CM

## 2024-10-01 RX ORDER — DIPHENOXYLATE HYDROCHLORIDE AND ATROPINE SULFATE 2.5; .025 MG/1; MG/1
1 TABLET ORAL 4 TIMES DAILY PRN
Qty: 120 TABLET | Refills: 3 | Status: SHIPPED | OUTPATIENT
Start: 2024-10-01

## 2024-10-01 NOTE — TELEPHONE ENCOUNTER
----- Message from Jennifer sent at 10/1/2024 10:14 AM CDT -----  Regarding: refill  Contact: [phone]  Pt requesting a refill   Medication name diphenoxylate-atropine 2.5-0.025 mg (LOMOTIL) 2.5-0.025 mg per tablet  Pharmacy   [facility] [address]  [address] [address]  Phone: [phone] Fax: [phone]

## 2024-10-02 ENCOUNTER — OFFICE VISIT (OUTPATIENT)
Dept: FAMILY MEDICINE | Facility: CLINIC | Age: 79
End: 2024-10-02
Payer: MEDICARE

## 2024-10-02 VITALS
DIASTOLIC BLOOD PRESSURE: 66 MMHG | HEIGHT: 65 IN | BODY MASS INDEX: 25.54 KG/M2 | HEART RATE: 68 BPM | SYSTOLIC BLOOD PRESSURE: 122 MMHG | OXYGEN SATURATION: 97 % | TEMPERATURE: 98 F | WEIGHT: 153.31 LBS

## 2024-10-02 DIAGNOSIS — K58.0 IRRITABLE BOWEL SYNDROME WITH DIARRHEA: ICD-10-CM

## 2024-10-02 DIAGNOSIS — I10 PRIMARY HYPERTENSION: ICD-10-CM

## 2024-10-02 DIAGNOSIS — I70.0 AORTIC ATHEROSCLEROSIS: ICD-10-CM

## 2024-10-02 DIAGNOSIS — I65.23 BILATERAL CAROTID ARTERY STENOSIS: ICD-10-CM

## 2024-10-02 DIAGNOSIS — E03.9 ACQUIRED HYPOTHYROIDISM: ICD-10-CM

## 2024-10-02 DIAGNOSIS — F41.1 GENERALIZED ANXIETY DISORDER: ICD-10-CM

## 2024-10-02 DIAGNOSIS — R73.01 IMPAIRED FASTING GLUCOSE: ICD-10-CM

## 2024-10-02 DIAGNOSIS — G44.229 CHRONIC TENSION-TYPE HEADACHE, NOT INTRACTABLE: Primary | ICD-10-CM

## 2024-10-02 DIAGNOSIS — M85.89 OSTEOPENIA OF MULTIPLE SITES: ICD-10-CM

## 2024-10-02 DIAGNOSIS — E78.2 MIXED HYPERLIPIDEMIA: ICD-10-CM

## 2024-10-02 PROCEDURE — 99999 PR PBB SHADOW E&M-EST. PATIENT-LVL IV: CPT | Mod: PBBFAC,,, | Performed by: STUDENT IN AN ORGANIZED HEALTH CARE EDUCATION/TRAINING PROGRAM

## 2024-10-02 PROCEDURE — 99214 OFFICE O/P EST MOD 30 MIN: CPT | Mod: PBBFAC,PN | Performed by: STUDENT IN AN ORGANIZED HEALTH CARE EDUCATION/TRAINING PROGRAM

## 2024-10-02 PROCEDURE — 99215 OFFICE O/P EST HI 40 MIN: CPT | Mod: S$PBB,,, | Performed by: STUDENT IN AN ORGANIZED HEALTH CARE EDUCATION/TRAINING PROGRAM

## 2024-10-02 NOTE — PROGRESS NOTES
Subjective:       Patient ID: Elizabeth Gleason is a 79 y.o. female.    Chief Complaint: Follow-up (Pt here for a 6 month follow up )      Review of Systems   All other systems reviewed and are negative.       A1C:  Recent Labs   Lab 09/14/23 0922 03/18/24  0914 09/30/24  0854   Hemoglobin A1C 5.3 5.5 5.4     CBC:  Recent Labs   Lab 09/14/23 0922 03/18/24  0914 09/30/24  0854   WBC 8.35 7.92 7.95   RBC 4.27 4.17 4.35   Hemoglobin 12.8 12.5 13.1   Hematocrit 38.7 37.8 39.6   Platelets 365 360 359   MCV 91 91 91   MCH 30.0 30.0 30.1   MCHC 33.1 33.1 33.1     CMP:  Recent Labs   Lab 09/14/23 0922 03/18/24 0914 09/30/24  0854   Glucose 114 H 113 H 121 H   Calcium 8.9 9.1 10.1   Albumin 4.3 4.2 4.1   Total Protein 7.4 7.4 7.7   Sodium 141 146 H 146 H   Potassium 3.7 4.2 4.4   CO2 27 30 H 28   Chloride 104 107 106   BUN 16 19 H 16   Creatinine 0.63 0.71 0.8   Alkaline Phosphatase 71 76 84   ALT 20 17 14   AST 26 26 18   Total Bilirubin 0.4 0.5 0.6     LIPIDS:  Recent Labs   Lab 09/14/23 0921 09/14/23 0922 03/18/24  0914 09/30/24  0854   TSH  --    < > 2.290 2.156   HDL 36 L  --  41 42   Cholesterol 188  --  224 H 220 H   Triglycerides 285 H  --  275 H 284 H   LDL Cholesterol 95.0  --  128.0 121.2   HDL/Cholesterol Ratio 19.1 L  --  18.3 L 19.1 L   Non-HDL Cholesterol 152  --  183 178   Total Cholesterol/HDL Ratio 5.2 H  --  5.5 H 5.2 H    < > = values in this interval not displayed.     TSH:  Recent Labs   Lab 09/14/23 0922 03/18/24  0914 09/30/24  0854   TSH 1.650 2.290 2.156        Objective:      Vitals:    10/02/24 1437   BP: 122/66   Pulse: 68   Temp: 97.7 °F (36.5 °C)      Physical Exam  Vitals reviewed.   Constitutional:       Appearance: Normal appearance. She is normal weight.   HENT:      Head: Normocephalic and atraumatic.   Eyes:      Conjunctiva/sclera: Conjunctivae normal.   Cardiovascular:      Rate and Rhythm: Normal rate and regular rhythm.      Heart sounds: Normal heart sounds.   Pulmonary:       Effort: Pulmonary effort is normal.      Breath sounds: Normal breath sounds.   Abdominal:      Palpations: Abdomen is soft.      Tenderness: There is no abdominal tenderness.   Musculoskeletal:         General: Normal range of motion.      Cervical back: Normal range of motion.      Right lower leg: No edema.      Left lower leg: No edema.   Neurological:      Mental Status: She is alert. Mental status is at baseline.   Psychiatric:         Mood and Affect: Mood normal.         Behavior: Behavior normal.          Assessment:       1. Chronic tension-type headache, not intractable    2. Generalized anxiety disorder    3. Primary hypertension    4. Mixed hyperlipidemia    5. Bilateral carotid artery stenosis    6. Aortic atherosclerosis    7. Impaired fasting glucose    8. Acquired hypothyroidism    9. Irritable bowel syndrome with diarrhea    10. Osteopenia of multiple sites        Plan:     Problem List Items Addressed This Visit          Neuro    Tension type headache - Primary    Overview     lexapro 20   This has helped a lot   Not really having issues any longer            Psychiatric    Anxiety disorder    Overview     lexapro 20   Valium PRN; less than weekly  1 RX lasts minimum 6 months;  review confirms   Taking more than usually dies 2/2 son with health issues, stroke and CHF, things are improving now and he is back at work but was difficult for a time               Cardiac/Vascular    Primary hypertension    Overview     Well controlled  Losartan/HCTZ 100/12.5  Asymptomatic            Mixed hyperlipidemia    Overview     Controlled  pitavastatin 1mg QHS   Tolerates   Has hx of statin intolerance in past; follows with cards   Had been having some muscle pains so was holding med         Bilateral carotid artery stenosis    Overview     Follows with cards  Statin and asa         Aortic atherosclerosis    Overview     Asa and statin             Endocrine    Impaired fasting glucose    Overview      Diet controlled  elevated fasting glucose          Acquired hypothyroidism    Overview     Synthroid 88  Asymptomatic   TSH WNL            GI    Spastic colon    Overview     Lomotil PRN   Follows with ANTOINETTE; Yuriy; annually             Orthopedic    Osteopenia    Overview     Ca/Vit D   strength training           Labs reviewed in detail  Problem list reviewed in detail   Continue healthy lifestyle efforts  Continue current meds as prescribed otherwise; refills per request  Keep routine specialist f/u   RTC in 6 months  with labs prior and/or PRN          Selena Marcstova [facility]   10/2/24     I spent a total of 41 minutes on the day of the visit.This includes face to face time and non-face to face time preparing to see the patient (eg, review of tests), obtaining and/or reviewing separately obtained history, documenting clinical information in the electronic or other health record, independently interpreting results and communicating results to the patient/family/caregiver, or care coordinator.

## 2024-10-04 ENCOUNTER — TELEPHONE (OUTPATIENT)
Dept: OPTOMETRY | Facility: CLINIC | Age: 79
End: 2024-10-04
Payer: MEDICARE

## 2024-10-04 NOTE — TELEPHONE ENCOUNTER
----- Message from Madhu sent at 10/4/2024 12:05 PM CDT -----  Contact: [phone]  Returning a Missed Call     Caller: Elizabeth Gleason        Returning call to: Ya Carroll     Caller can be reached @:  [phone]     Nature of the call: Scheduled for an appt but would still like to speak with you.

## 2024-10-04 NOTE — TELEPHONE ENCOUNTER
----- Message from Raquel sent at 10/4/2024 10:56 AM CDT -----  Regarding: question  Type:  Needs Medical Advice    Who Called: pt  Would the patient rather a call back or a response via [facility]? Call   Best Call Back Number: [phone]  Additional Information: pt would like a call back in regards to a few questions before setting up an appt

## 2024-10-15 DIAGNOSIS — E03.9 ACQUIRED HYPOTHYROIDISM: ICD-10-CM

## 2024-10-15 RX ORDER — LEVOTHYROXINE SODIUM 88 UG/1
88 TABLET ORAL DAILY
Qty: 90 TABLET | Refills: 3 | Status: SHIPPED | OUTPATIENT
Start: 2024-10-15

## 2024-10-15 NOTE — TELEPHONE ENCOUNTER
----- Message from Dionna sent at 10/15/2024  1:59 PM CDT -----  Type:  RX Refill Request    Who Called:  Elizabeth Gleason  Refill or New Rx: Refill  RX Name and Strength:   levothyroxine (SYNTHROID) 88 MCG tablet [[id number]   How is the patient currently taking it? (ex. 1XDay): Take 1 tablet (88 mcg total) by mouth once daily. - Oral  Is this a 30 day or 90 day RX: 90  Preferred Pharmacy with phone number: [facility] - [address] [facility]   Phone: [phone]  Fax: [phone]  Local or Mail Order: Mail  Ordering Provider: Briana  Would the patient rather a call back or a response via [facility]?  call  Best Call Back Number:   Additional Information:

## 2024-10-15 NOTE — TELEPHONE ENCOUNTER
No care due was identified.  [facility] Embedded Care Due Messages. Reference number: [id number].   10/15/2024 2:24:05 PM CDT

## 2024-10-24 ENCOUNTER — TELEPHONE (OUTPATIENT)
Dept: OPTOMETRY | Facility: CLINIC | Age: 79
End: 2024-10-24
Payer: MEDICARE

## 2024-10-28 ENCOUNTER — TELEPHONE (OUTPATIENT)
Dept: OPTOMETRY | Facility: CLINIC | Age: 79
End: 2024-10-28
Payer: MEDICARE

## 2025-01-02 ENCOUNTER — TELEPHONE (OUTPATIENT)
Dept: FAMILY MEDICINE | Facility: CLINIC | Age: 80
End: 2025-01-02
Payer: MEDICARE

## 2025-01-02 NOTE — TELEPHONE ENCOUNTER
----- Message from Becki sent at 1/2/2025  3:04 PM CST -----  Type:  Sooner Apoointment Request    Caller is requesting a sooner appointment.  Caller declined first available appointment listed below.  Caller will not accept being placed on the waitlist and is requesting a message be sent to doctor.  Name of Caller:Pt   When is the first available appointment?02/11  Symptoms:back pain   Would the patient rather a call back or a response via [facility]? Call back   Best Call Back Number:[phone]  Additional Information: middle back pain ... If she walk to much it start hurting or if she do anything to much

## 2025-01-07 ENCOUNTER — OFFICE VISIT (OUTPATIENT)
Dept: FAMILY MEDICINE | Facility: CLINIC | Age: 80
End: 2025-01-07
Payer: MEDICARE

## 2025-01-07 VITALS
DIASTOLIC BLOOD PRESSURE: 72 MMHG | TEMPERATURE: 98 F | OXYGEN SATURATION: 98 % | HEIGHT: 65 IN | WEIGHT: 156.19 LBS | HEART RATE: 67 BPM | BODY MASS INDEX: 26.02 KG/M2 | SYSTOLIC BLOOD PRESSURE: 172 MMHG

## 2025-01-07 DIAGNOSIS — M54.6 ACUTE MIDLINE THORACIC BACK PAIN: Primary | ICD-10-CM

## 2025-01-07 DIAGNOSIS — I10 PRIMARY HYPERTENSION: ICD-10-CM

## 2025-01-07 DIAGNOSIS — M79.672 LEFT FOOT PAIN: ICD-10-CM

## 2025-01-07 PROCEDURE — 99214 OFFICE O/P EST MOD 30 MIN: CPT | Mod: S$PBB,,, | Performed by: NURSE PRACTITIONER

## 2025-01-07 PROCEDURE — 96372 THER/PROPH/DIAG INJ SC/IM: CPT | Mod: PBBFAC,PN

## 2025-01-07 PROCEDURE — 99999 PR PBB SHADOW E&M-EST. PATIENT-LVL V: CPT | Mod: PBBFAC,,, | Performed by: NURSE PRACTITIONER

## 2025-01-07 PROCEDURE — 99999PBSHW PR PBB SHADOW TECHNICAL ONLY FILED TO HB: Mod: PBBFAC,,,

## 2025-01-07 PROCEDURE — 99215 OFFICE O/P EST HI 40 MIN: CPT | Mod: PBBFAC,PN | Performed by: NURSE PRACTITIONER

## 2025-01-07 RX ORDER — KETOROLAC TROMETHAMINE 30 MG/ML
30 INJECTION, SOLUTION INTRAMUSCULAR; INTRAVENOUS
Status: COMPLETED | OUTPATIENT
Start: 2025-01-07 | End: 2025-01-07

## 2025-01-07 RX ORDER — MELOXICAM 7.5 MG/1
TABLET ORAL
Qty: 30 TABLET | Refills: 0 | Status: SHIPPED | OUTPATIENT
Start: 2025-01-07 | End: 2025-02-13

## 2025-01-07 RX ORDER — KETOROLAC TROMETHAMINE 30 MG/ML
30 INJECTION, SOLUTION INTRAMUSCULAR; INTRAVENOUS
Status: SHIPPED | OUTPATIENT
Start: 2025-01-07

## 2025-01-07 RX ADMIN — KETOROLAC TROMETHAMINE 30 MG: 30 INJECTION, SOLUTION INTRAMUSCULAR; INTRAVENOUS at 02:01

## 2025-01-07 NOTE — PROGRESS NOTES
Patient ID: Elizabeth Gleason is a 79 y.o. female.     Chief Complaint: Back Pain (Middle back pain more w/movements)      HPI:  Mrs Gleason is here for intermittent but chronic left foot and mid back pain. She reports both have been going on for many years but in the past 6 months seem to be worsening. She has never had imaging done on either. Activity makes both worse and resting makes both better. She has tried OTC Advill 400mg and  Tylenol 325 mg without relief. Heating pad makes it better. She denies weakness, falls, pain in legs, urinary complaints, urinary or bowel incontinence. Labs done 9/24 were normal in regards to kidney function. She takes medications as prescribed. She reports back pain is a 6 today and foot pain is a 10. Of note her blood pressure is elevated on today's visit. She tool her medication this morning. She checks her BP at home intermittently and reports its ok when she is not in pain but it is elevated when she is in pain. She denies Chest pain, SOB, palpitations, headaches or leg swelling.         Active Problem List with Overview Notes    Diagnosis Date Noted    Spastic colon 09/08/2022     Lomotil PRN   Follows with GI; Yuriy; annually       Aortic atherosclerosis 01/10/2020     Asa and statin       Mixed hyperlipidemia 03/01/2018     Controlled  pitavastatin 1mg QHS   Tolerates   Has hx of statin intolerance in past; follows with cards   Had been having some muscle pains so was holding med      Anxiety disorder 03/01/2018     lexapro 20   Valium PRN; less than weekly  1 RX lasts minimum 6 months;  review confirms   Taking more than usually dies 2/2 son with health issues, stroke and CHF, things are improving now and he is back at work but was difficult for a time         Acquired hypothyroidism 11/16/2017     Synthroid 88  Asymptomatic   TSH WNL      Tension type headache 06/10/2016     lexapro 20   This has helped a lot   Not really having issues any longer       Primary hypertension      Well controlled  Losartan/HCTZ 100/12.5  Asymptomatic         Impaired fasting glucose      Diet controlled  elevated fasting glucose       Osteopenia      Ca/Vit D   strength training       Bilateral carotid artery stenosis      Follows with cards  Statin and asa          Review of Systems  Review of Systems   Constitutional: Negative.    HENT: Negative.     Eyes: Negative.    Respiratory: Negative.     Cardiovascular: Negative.    Gastrointestinal: Negative.    Genitourinary: Negative.    Musculoskeletal:  Positive for back pain and joint pain. Negative for falls, myalgias and neck pain.   Skin: Negative.    Neurological: Negative.    Endo/Heme/Allergies: Negative.    Psychiatric/Behavioral: Negative.         Currently Medications  Current Outpatient Medications on File Prior to Visit   Medication Sig Dispense Refill    aspirin (ECOTRIN) 81 MG EC tablet Take 81 mg by mouth once daily.      azelastine (ASTELIN) 137 mcg (0.1 %) nasal spray 1 spray (137 mcg total) by Nasal route 2 (two) times daily. 30 mL 11    blood sugar diagnostic Strp To check BG 1 times daily, to use with insurance preferred meter 100 each 2    blood-glucose meter kit To check BG 1 times daily, to use with insurance preferred meter 1 each 0    diazePAM (VALIUM) 5 MG tablet Take 1 tablet (5 mg total) by mouth daily as needed for Anxiety. 30 tablet 0    diphenoxylate-atropine 2.5-0.025 mg (LOMOTIL) 2.5-0.025 mg per tablet Take 1 tablet by mouth 4 (four) times daily as needed for Diarrhea. 120 tablet 3    EScitalopram oxalate (LEXAPRO) 20 MG tablet Take 1 tablet (20 mg total) by mouth every evening. 90 tablet 3    fluticasone propionate (FLONASE) 50 mcg/actuation nasal spray 2 sprays (100 mcg total) by Each Nostril route once daily. 9.9 mL 3    lancets Misc To check BG 1 times daily, to use with insurance preferred meter 100 each 2    levothyroxine (SYNTHROID) 88 MCG tablet Take 1 tablet (88 mcg total) by mouth once daily.  90 tablet 3    losartan-hydrochlorothiazide 100-12.5 mg (HYZAAR) 100-12.5 mg Tab Take 1 tablet by mouth once daily. 90 tablet 3    multivit-min-iron-FA-lutein 8 mg iron-400 mcg-300 mcg Tab Take 1 tablet by mouth once daily.      neomycin-polymyxin-hydrocortisone (CORTISPORIN) 3.5-10,000-1 mg/mL-unit/mL-% otic suspension Place 3 drops into the right ear 4 (four) times daily. 10 mL 2    omega-3 fatty acids/fish oil (FISH OIL-OMEGA-3 FATTY ACIDS) 300-1,000 mg capsule Take 1 capsule by mouth once daily.      pitavastatin calcium (LIVALO) 1 mg Tab tablet Take 1 tablet (1 mg total) by mouth every evening. 90 tablet 3    [DISCONTINUED] ibuprofen (ADVIL,MOTRIN) 200 MG tablet Take 200 mg by mouth as needed for Pain.      [DISCONTINUED] meloxicam (MOBIC) 7.5 MG tablet TAKE 1 TABLET(7.5 MG) BY MOUTH EVERY DAY 30 tablet 0    [DISCONTINUED] phenazopyridine (PYRIDIUM) 200 MG tablet Take 1 tablet (200 mg total) by mouth 3 (three) times daily as needed for Pain (bladder). 30 tablet 2     No current facility-administered medications on file prior to visit.       Allergies  Review of patient's allergies indicates:   Allergen Reactions    Bempedoic acid      Patient developed myopathy    Levaquin [levofloxacin]      Heart raced and felt faint    Sulfa (sulfonamide antibiotics) Nausea And Vomiting    Tramadol Nausea And Vomiting        Health Maintenance  Health Maintenance Due   Topic    Mammogram         PMH:  Past Medical History:   Diagnosis Date    ALLERGIC RHINITIS     Chronic diarrhea     GERD (gastroesophageal reflux disease)     HEARING LOSS     Hypertension     Impaired fasting glucose     Osteopenia     Tuberculosis     childhood TB      Past Surgical History:   Procedure Laterality Date    COLONOSCOPY N/A 8/25/2017    Procedure: COLONOSCOPY;  Surgeon: Keny Lui MD;  Location: [facility] ([address] [place] [address]);  Service: Endoscopy;  Laterality: N/A;    left 5th toe surgery      rt ear surgery      rt lung lobectomy and a rib removed   "    secondary to TB    TUBAL LIGATION        Social History     Socioeconomic History    Marital status:    Occupational History    Occupation: retired teller   Tobacco Use    Smoking status: Never    Smokeless tobacco: Never   Substance and Sexual Activity    Alcohol use: Yes     Alcohol/week: 1.0 standard drink of alcohol     Types: 1 Glasses of wine per week     Comment: maybe 1--2 monthly    Drug use: No    Sexual activity: Not Currently     Comment:    Social History Narrative    She exercises regularly.      Family History   Problem Relation Name Age of Onset    Heart disease Mother          cabg    Kidney disease Mother          was on dialysis    Stroke Father      Cancer Brother          lung cancer    No Known Problems Son      Breast cancer Cousin Pat first cousin         paternal FIRST cousin    Ovarian cancer Neg Hx      Esophageal cancer Neg Hx      Colon cancer Neg Hx            Physical  Exam  Vitals:    01/07/25 1333   BP: (!) 172/72   BP Location: Left arm   Patient Position: Sitting   Pulse: 67   Temp: 97.5 °F (36.4 °C)   TempSrc: Temporal   SpO2: 98%   Weight: 70.8 kg (156 lb 3.1 oz)   Height: 5' 5" (1.651 m)      Body mass index is 25.99 kg/m².  Wt Readings from Last 3 Encounters:   01/07/25 70.8 kg (156 lb 3.1 oz)   10/02/24 69.6 kg (153 lb 5.3 oz)   06/06/24 72.7 kg (160 lb 6.2 oz)       Physical Exam  Vitals and nursing note reviewed.   Constitutional:       Appearance: Normal appearance.   HENT:      Head: Normocephalic.   Eyes:      Pupils: Pupils are equal, round, and reactive to light.   Cardiovascular:      Rate and Rhythm: Normal rate and regular rhythm.      Pulses: Normal pulses.      Heart sounds: Normal heart sounds.   Pulmonary:      Effort: Pulmonary effort is normal.      Breath sounds: Normal breath sounds.   Abdominal:      General: Bowel sounds are normal.      Palpations: Abdomen is soft.   Musculoskeletal:         General: Normal range of motion.      Cervical " back: Normal.      Thoracic back: Tenderness present. No bony tenderness. Normal range of motion. No scoliosis.      Lumbar back: Normal.      Right ankle: Normal.      Left ankle: Normal.      Right foot: Normal.      Left foot: Normal range of motion. Tenderness present. No swelling, deformity, foot drop, bony tenderness or crepitus. Normal pulse.   Skin:     General: Skin is warm and dry.      Capillary Refill: Capillary refill takes less than 2 seconds.   Neurological:      General: No focal deficit present.      Mental Status: She is alert and oriented to person, place, and time. Mental status is at baseline.   Psychiatric:         Mood and Affect: Mood normal.         Behavior: Behavior normal.         Labs:    A1C:  Recent Labs   Lab 09/14/23 0922 03/18/24  0914 09/30/24  0854   Hemoglobin A1C 5.3 5.5 5.4     CBC:  Recent Labs   Lab 09/14/23 0922 03/18/24  0914 09/30/24  0854   WBC 8.35 7.92 7.95   RBC 4.27 4.17 4.35   Hemoglobin 12.8 12.5 13.1   Hematocrit 38.7 37.8 39.6   Platelets 365 360 359   MCV 91 91 91   MCH 30.0 30.0 30.1   MCHC 33.1 33.1 33.1     CMP:  Recent Labs   Lab 09/14/23 0922 03/18/24  0914 09/30/24  0854   Glucose 114 H 113 H 121 H   Calcium 8.9 9.1 10.1   Albumin 4.3 4.2 4.1   Total Protein 7.4 7.4 7.7   Sodium 141 146 H 146 H   Potassium 3.7 4.2 4.4   CO2 27 30 H 28   Chloride 104 107 106   BUN 16 19 H 16   Creatinine 0.63 0.71 0.8   Alkaline Phosphatase 71 76 84   ALT 20 17 14   AST 26 26 18   Total Bilirubin 0.4 0.5 0.6     LIPIDS:  Recent Labs   Lab 09/14/23  0921 09/14/23 0922 03/18/24  0914 09/30/24  0854   TSH  --    < > 2.290 2.156   HDL 36 L  --  41 42   Cholesterol 188  --  224 H 220 H   Triglycerides 285 H  --  275 H 284 H   LDL Cholesterol 95.0  --  128.0 121.2   HDL/Cholesterol Ratio 19.1 L  --  18.3 L 19.1 L   Non-HDL Cholesterol 152  --  183 178   Total Cholesterol/HDL Ratio 5.2 H  --  5.5 H 5.2 H    < > = values in this interval not displayed.     TSH:  Recent Labs    Lab 09/14/23  0922 03/18/24  0914 09/30/24  0854   TSH 1.650 2.290 2.156              Assessment:      1. Acute midline thoracic back pain    2. Left foot pain    3. Primary hypertension          Plan:  Acute midline thoracic back pain   RICE/ heat prn  Stretching exercises  Avoid heavy lifting     RX: Meloxicam 7.5 mg BID x 7 days then QD    Toradol 30 mg IM given in clinic today     Left foot pain    Primary hypertension  Take all meds as prescribed  Monitor BP daily and record  F/U in 4 weeks for recheck or sooner if BP remains > 150/80       Orders Placed This Encounter   Procedures    X-Ray Foot Complete Left     Standing Status:   Future     Standing Expiration Date:   1/7/2026     Order Specific Question:   May the Radiologist modify the order per protocol to meet the clinical needs of the patient?     Answer:   Yes     Order Specific Question:   Release to patient     Answer:   Immediate    X-Ray Thoracic Spine AP Lateral     Standing Status:   Future     Standing Expiration Date:   1/7/2026     Order Specific Question:   May the Radiologist modify the order per protocol to meet the clinical needs of the patient?     Answer:   Yes     Order Specific Question:   Release to patient     Answer:   Immediate          Follow up in about 4 weeks (around 2/4/2025).       Verona Menjivar, TRISHA MarcU.S[facility]  1/7/25

## 2025-01-09 ENCOUNTER — TELEPHONE (OUTPATIENT)
Dept: FAMILY MEDICINE | Facility: CLINIC | Age: 80
End: 2025-01-09
Payer: MEDICARE

## 2025-01-09 NOTE — TELEPHONE ENCOUNTER
Called and spoke with pt in regards to her call back request. Pt is wanting to know the test results for her x-ray. Pt was informed that the provider will be notified and will give her a call back as soon as she is available. Pt verbalized understanding.

## 2025-01-09 NOTE — TELEPHONE ENCOUNTER
----- Message from Becki sent at 1/9/2025  4:22 PM CST -----  Type:  Patient  Call    Who Called:Pt   Would the patient rather a call back or a response via [facility]? Call back   Best Call Back Number: [phone]  Additional Information: checking to see if office tried to call her about her results... pt missed a call

## 2025-01-10 ENCOUNTER — TELEPHONE (OUTPATIENT)
Dept: FAMILY MEDICINE | Facility: CLINIC | Age: 80
End: 2025-01-10
Payer: MEDICARE

## 2025-01-10 NOTE — TELEPHONE ENCOUNTER
Called and spoke with pt in regards to her call back request. Pt is requesting for provider to give her a call back to discuss test results. Pt was informed that the provider will be notified of her call back request. Pt verbalized understanding. Provider was notified to give pt a call back.

## 2025-01-10 NOTE — TELEPHONE ENCOUNTER
----- Message from Junior sent at 1/10/2025  9:46 AM CST -----  Type:  Patient Returning Call    Who Called:pt   Who Left Message for Patient:julien   Does the patient know what this is regarding?:returning a call   Would the patient rather a call back or a response via [facility]? Call   Best Call Back Number: [phone]  Additional Information:

## 2025-01-11 ENCOUNTER — TELEPHONE (OUTPATIENT)
Dept: OPTOMETRY | Facility: CLINIC | Age: 80
End: 2025-01-11
Payer: MEDICARE

## 2025-01-13 ENCOUNTER — TELEPHONE (OUTPATIENT)
Dept: GASTROENTEROLOGY | Facility: CLINIC | Age: 80
End: 2025-01-13
Payer: MEDICARE

## 2025-01-13 NOTE — TELEPHONE ENCOUNTER
, this patient is requesting you to refill her medication, and she is also trying to get a yearly appt, but there is no availability at the moment.

## 2025-01-13 NOTE — TELEPHONE ENCOUNTER
----- Message from Sameer sent at 1/13/2025  8:41 AM CST -----  Name of Caller:     Nature of Call:Requesting an appt    Best Call Back Number:[phone]     Additional Information:YESENIA VELÁSQUEZ calling regarding Appointment Access for a 1 year follow up appt. Please call back to assist

## 2025-01-14 ENCOUNTER — TELEPHONE (OUTPATIENT)
Facility: CLINIC | Age: 80
End: 2025-01-14
Payer: MEDICARE

## 2025-01-14 DIAGNOSIS — K52.9 CHRONIC DIARRHEA: ICD-10-CM

## 2025-01-14 RX ORDER — DIPHENOXYLATE HYDROCHLORIDE AND ATROPINE SULFATE 2.5; .025 MG/1; MG/1
1 TABLET ORAL 4 TIMES DAILY PRN
Qty: 120 TABLET | Refills: 3 | Status: SHIPPED | OUTPATIENT
Start: 2025-01-14

## 2025-01-14 NOTE — TELEPHONE ENCOUNTER
----- Message from Desi sent at 1/14/2025 11:18 AM CST -----  Type:   Appointment Request      Name of Caller:pt  When is the first available appointment?3.6  Symptoms:Annual  Best Call Back Number: [phone]  Additional Information: annual for after 3/12

## 2025-01-14 NOTE — TELEPHONE ENCOUNTER
MA Spoke to patient, patient aware medication refill is available, patient will call in a month to check for availability.

## 2025-01-16 ENCOUNTER — TELEPHONE (OUTPATIENT)
Dept: OBSTETRICS AND GYNECOLOGY | Facility: CLINIC | Age: 80
End: 2025-01-16
Payer: MEDICARE

## 2025-01-16 DIAGNOSIS — Z12.31 SCREENING MAMMOGRAM FOR BREAST CANCER: Primary | ICD-10-CM

## 2025-01-16 NOTE — TELEPHONE ENCOUNTER
----- Message from [employer] sent at 1/16/2025  9:50 AM CST -----  .Type:  Mammogram    Caller is requesting to schedule their annual mammogram appointment.  Order is not listed in EPIC.  Please enter order and contact patient to schedule.  Name of Caller:pt  Where would they like the mammogram performed?[facility]  Would the patient rather a call back or a response via [facility]? Call back  Best Call Back Number:[phone]  Additional Information:

## 2025-01-16 NOTE — TELEPHONE ENCOUNTER
I called the patient and got her scheduled for her mammogram and added the order. She was given appointment date and time and verbalized understanding.

## 2025-02-07 ENCOUNTER — OFFICE VISIT (OUTPATIENT)
Dept: FAMILY MEDICINE | Facility: CLINIC | Age: 80
End: 2025-02-07
Payer: MEDICARE

## 2025-02-07 VITALS
TEMPERATURE: 98 F | HEIGHT: 65 IN | WEIGHT: 153.88 LBS | OXYGEN SATURATION: 98 % | SYSTOLIC BLOOD PRESSURE: 154 MMHG | HEART RATE: 71 BPM | BODY MASS INDEX: 25.64 KG/M2 | DIASTOLIC BLOOD PRESSURE: 64 MMHG

## 2025-02-07 DIAGNOSIS — I10 PRIMARY HYPERTENSION: ICD-10-CM

## 2025-02-07 DIAGNOSIS — M54.9 DORSALGIA: Primary | ICD-10-CM

## 2025-02-07 DIAGNOSIS — F41.1 GENERALIZED ANXIETY DISORDER: ICD-10-CM

## 2025-02-07 PROCEDURE — 99999 PR PBB SHADOW E&M-EST. PATIENT-LVL IV: CPT | Mod: PBBFAC,,, | Performed by: NURSE PRACTITIONER

## 2025-02-07 PROCEDURE — 99214 OFFICE O/P EST MOD 30 MIN: CPT | Mod: PBBFAC,PN | Performed by: NURSE PRACTITIONER

## 2025-02-07 PROCEDURE — 99214 OFFICE O/P EST MOD 30 MIN: CPT | Mod: S$PBB,,, | Performed by: NURSE PRACTITIONER

## 2025-02-07 RX ORDER — DIAZEPAM 5 MG/1
5 TABLET ORAL DAILY PRN
Qty: 30 TABLET | Refills: 0 | Status: SHIPPED | OUTPATIENT
Start: 2025-02-07

## 2025-02-07 RX ORDER — NEOMYCIN SULFATE, POLYMYXIN B SULFATE AND HYDROCORTISONE 10; 3.5; 1 MG/ML; MG/ML; [USP'U]/ML
3 SUSPENSION/ DROPS AURICULAR (OTIC) 4 TIMES DAILY
Qty: 10 ML | Refills: 5 | Status: SHIPPED | OUTPATIENT
Start: 2025-02-07

## 2025-02-07 RX ORDER — MELOXICAM 7.5 MG/1
7.5 TABLET ORAL DAILY
Qty: 90 TABLET | Refills: 1 | Status: SHIPPED | OUTPATIENT
Start: 2025-02-07

## 2025-02-07 NOTE — PROGRESS NOTES
" Patient ID: Elizabeth Gleason is a 79 y.o. female.     Chief Complaint: Follow-up (1 month f/u)      HPI:  Mrs Gleason is here for intermittent but chronic left foot and mid back pain follow up. She was seen one month ago, imaging was done which showed normal thoracic spine and spur in the left foot. She reports significant relief for a few days from injection and Meloxicam every morning has given her moderate relief. She has an appt with podiatry to discuss foot pain which is not as relived as the back pain. She denies weakness, falls, pain in legs, urinary complaints, urinary or bowel incontinence.  Her blood pressure was also elebvted on prior visit and is better today but still elevated. She reports home BP have been "good" ranging in 120-130s/70s.  She denies Chest pain, SOB, palpitations, headaches or leg swelling.         Review of Systems  Review of Systems   Constitutional: Negative.    HENT: Negative.     Eyes: Negative.    Respiratory: Negative.     Cardiovascular: Negative.    Gastrointestinal: Negative.    Genitourinary: Negative.    Musculoskeletal: Negative.    Skin: Negative.    Neurological: Negative.    Endo/Heme/Allergies: Negative.    Psychiatric/Behavioral: Negative.         Currently Medications  Current Outpatient Medications on File Prior to Visit   Medication Sig Dispense Refill    aspirin (ECOTRIN) 81 MG EC tablet Take 81 mg by mouth once daily.      azelastine (ASTELIN) 137 mcg (0.1 %) nasal spray 1 spray (137 mcg total) by Nasal route 2 (two) times daily. 30 mL 11    blood sugar diagnostic Strp To check BG 1 times daily, to use with insurance preferred meter 100 each 2    blood-glucose meter kit To check BG 1 times daily, to use with insurance preferred meter 1 each 0    diazePAM (VALIUM) 5 MG tablet Take 1 tablet (5 mg total) by mouth daily as needed for Anxiety. 30 tablet 0    diphenoxylate-atropine 2.5-0.025 mg (LOMOTIL) 2.5-0.025 mg per tablet Take 1 tablet by mouth 4 (four) " times daily as needed for Diarrhea. 120 tablet 3    EScitalopram oxalate (LEXAPRO) 20 MG tablet Take 1 tablet (20 mg total) by mouth every evening. 90 tablet 3    fluticasone propionate (FLONASE) 50 mcg/actuation nasal spray 2 sprays (100 mcg total) by Each Nostril route once daily. 9.9 mL 3    lancets Misc To check BG 1 times daily, to use with insurance preferred meter 100 each 2    levothyroxine (SYNTHROID) 88 MCG tablet Take 1 tablet (88 mcg total) by mouth once daily. 90 tablet 3    losartan-hydrochlorothiazide 100-12.5 mg (HYZAAR) 100-12.5 mg Tab Take 1 tablet by mouth once daily. 90 tablet 3    multivit-min-iron-FA-lutein 8 mg iron-400 mcg-300 mcg Tab Take 1 tablet by mouth once daily.      neomycin-polymyxin-hydrocortisone (CORTISPORIN) 3.5-10,000-1 mg/mL-unit/mL-% otic suspension Place 3 drops into the right ear 4 (four) times daily. 10 mL 2    omega-3 fatty acids/fish oil (FISH OIL-OMEGA-3 FATTY ACIDS) 300-1,000 mg capsule Take 1 capsule by mouth once daily.      pitavastatin calcium (LIVALO) 1 mg Tab tablet Take 1 tablet (1 mg total) by mouth every evening. 90 tablet 3    [DISCONTINUED] meloxicam (MOBIC) 7.5 MG tablet Take 1 tablet (7.5 mg total) by mouth 2 (two) times a day for 7 days, THEN 1 tablet (7.5 mg total) once daily. 30 tablet 0     Current Facility-Administered Medications on File Prior to Visit   Medication Dose Route Frequency Provider Last Rate Last Admin    ketorolac injection 30 mg  30 mg Intramuscular 1 time in Clinic/HOD            Allergies  Review of patient's allergies indicates:   Allergen Reactions    Bempedoic acid      Patient developed myopathy    Levaquin [levofloxacin]      Heart raced and felt faint    Sulfa (sulfonamide antibiotics) Nausea And Vomiting    Tramadol Nausea And Vomiting        Health Maintenance  Health Maintenance Topics with due status: Not Due       Topic Last Completion Date    Colonoscopy 08/25/2017    Hemoglobin A1c (Prediabetes) 09/30/2024    Lipid Panel  "09/30/2024    Aspirin/Antiplatelet Therapy 02/07/2025          PMH:  Past Medical History:   Diagnosis Date    ALLERGIC RHINITIS     Chronic diarrhea     GERD (gastroesophageal reflux disease)     HEARING LOSS     Hypertension     Impaired fasting glucose     Osteopenia     Tuberculosis     childhood TB      Past Surgical History:   Procedure Laterality Date    COLONOSCOPY N/A 8/25/2017    Procedure: COLONOSCOPY;  Surgeon: Keny Lui MD;  Location: [address]);  Service: Endoscopy;  Laterality: N/A;    left 5th toe surgery      rt ear surgery      rt lung lobectomy and a rib removed      secondary to TB    TUBAL LIGATION           Physical  Exam  Vitals:    02/07/25 1417   BP: (!) 154/64   BP Location: Left arm   Patient Position: Sitting   Pulse: 71   Temp: 97.7 °F (36.5 °C)   TempSrc: Temporal   SpO2: 98%   Weight: 69.8 kg (153 lb 14.1 oz)   Height: 5' 5" (1.651 m)      Body mass index is 25.61 kg/m².  Wt Readings from Last 3 Encounters:   02/07/25 69.8 kg (153 lb 14.1 oz)   01/07/25 70.8 kg (156 lb 3.1 oz)   10/02/24 69.6 kg (153 lb 5.3 oz)         Physical Exam  Vitals and nursing note reviewed.   Constitutional:       Appearance: Normal appearance.   HENT:      Head: Normocephalic.   Eyes:      Pupils: Pupils are equal, round, and reactive to light.   Cardiovascular:      Rate and Rhythm: Normal rate and regular rhythm.      Pulses: Normal pulses.      Heart sounds: Normal heart sounds.   Pulmonary:      Effort: Pulmonary effort is normal.      Breath sounds: Normal breath sounds.   Abdominal:      General: Bowel sounds are normal.      Palpations: Abdomen is soft.   Musculoskeletal:         General: Normal range of motion.   Skin:     General: Skin is warm and dry.      Capillary Refill: Capillary refill takes less than 2 seconds.   Neurological:      Mental Status: She is alert and oriented to person, place, and time.   Psychiatric:         Mood and Affect: Mood normal.         Behavior: Behavior " normal.             Assessment/Plan:    1. Dorsalgia       RICE/ heat prn  Stretching exercises  Avoid heavy lifting      -     meloxicam (MOBIC) 7.5 MG tablet; Take 1 tablet (7.5 mg total) by mouth once daily.  Dispense: 90 tablet; Refill: 1    2. Generalized anxiety disorder   Continue current POC    3. Hypertension   Continue current medications  Monitor BP three times weekly, goal is SBP 130s/60-80s      Orders Placed This Encounter    meloxicam (MOBIC) 7.5 MG tablet    diazePAM (VALIUM) 5 MG tablet    neomycin-polymyxin-hydrocortisone (CORTISPORIN) 3.5-10,000-1 mg/mL-unit/mL-% otic suspension          Follow up in about 2 months (around 4/7/2025). Have labs done prior to appt     Verona Menjivar NP  [facility] [place]   2/7/2025

## 2025-02-10 ENCOUNTER — TELEPHONE (OUTPATIENT)
Dept: GASTROENTEROLOGY | Facility: CLINIC | Age: 80
End: 2025-02-10
Payer: MEDICARE

## 2025-02-12 DIAGNOSIS — N32.89 BLADDER SPASMS: ICD-10-CM

## 2025-02-12 RX ORDER — PHENAZOPYRIDINE HYDROCHLORIDE 200 MG/1
TABLET, FILM COATED ORAL
Qty: 30 TABLET | Refills: 2 | Status: SHIPPED | OUTPATIENT
Start: 2025-02-12

## 2025-02-19 ENCOUNTER — OFFICE VISIT (OUTPATIENT)
Dept: PODIATRY | Facility: CLINIC | Age: 80
End: 2025-02-19
Payer: MEDICARE

## 2025-02-19 ENCOUNTER — TELEPHONE (OUTPATIENT)
Dept: PODIATRY | Facility: CLINIC | Age: 80
End: 2025-02-19

## 2025-02-19 VITALS — WEIGHT: 152.75 LBS | BODY MASS INDEX: 25.42 KG/M2

## 2025-02-19 DIAGNOSIS — M76.72 PERONEAL TENDONITIS OF LEFT LOWER EXTREMITY: Primary | ICD-10-CM

## 2025-02-19 DIAGNOSIS — M79.671 RIGHT FOOT PAIN: Primary | ICD-10-CM

## 2025-02-19 PROCEDURE — 99213 OFFICE O/P EST LOW 20 MIN: CPT | Mod: PBBFAC,PN | Performed by: PODIATRIST

## 2025-02-19 RX ORDER — METHYLPREDNISOLONE 4 MG/1
TABLET ORAL
Qty: 21 EACH | Refills: 0 | Status: SHIPPED | OUTPATIENT
Start: 2025-02-19 | End: 2025-02-19 | Stop reason: SDUPTHER

## 2025-02-19 RX ORDER — METHYLPREDNISOLONE 4 MG/1
TABLET ORAL
Qty: 21 EACH | Refills: 0 | Status: SHIPPED | OUTPATIENT
Start: 2025-02-19

## 2025-02-19 NOTE — PROGRESS NOTES
[facility] - PODIATRY  [address]  [address]  [place] [address]  Dept: [phone]  Dept Fax: [phone]    Gera Richter Jr., DPM     Assessment:   MDM    Coding  1. Peroneal tendonitis of left lower extremity  X-Ray Foot Complete Left    methylPREDNISolone (MEDROL DOSEPACK) 4 mg tablet          Plan:     Procedures  1. Peroneal tendonitis of left lower extremity  -     X-Ray Foot Complete Left; Future; Expected date: 02/19/2025  -     methylPREDNISolone (MEDROL DOSEPACK) 4 mg tablet; use as directed  Dispense: 21 each; Refill: 0      Elizabeth was seen today for foot pain.    Diagnoses and all orders for this visit:    Peroneal tendonitis of left lower extremity  -     X-Ray Foot Complete Left; Future  -     methylPREDNISolone (MEDROL DOSEPACK) 4 mg tablet; use as directed        -pt seen, evaluated, and managed  -dx discussed in detail. All questions/concerns addressed  -all tx options discussed. All alternatives, risks, benefits of all txs discussed  -We discussed conservative care options possible including but not limited to shoe wear and/or padding, bracing/strapping, at home ROM, formal PT, medical therapy, injection therapy  - The utilization of NSAIDs can be considered but their benefit has to be tempered against the risk of GI/ concerns  - A steroid injection can be undertaken.  We did discuss the potential mechanism of action of this shot.  Understanding that multiple injections at the same anatomic site do have deleterious effects on the soft tissue.  Generic risks include: steroid flare (advised to ice if necessary), skin hypo-pgimentation (which can be permanent and unsightly), elevation of blood sugar, subcutaneous atrophy (can be permanent) and infection.   -XR/imaging reviewed by me: agree with read  -labs reviewed by me: okf or medrol dose pack  -implemented icing/stretching regimen  - CAM boot  dispensed    -rxs dispensed: medrol dose  pack  -referrals:  none  -WB: wbat in [facility]      Follow up in about 4 weeks (around 3/19/2025).    Subjective:      Patient ID: Elizabeth Gleason is a 79 y.o. female.    Chief Complaint:   Chief Complaint   Patient presents with    Foot Pain       CC - foot pain: patient presents to the podiatry clinic  with complaint of  left foot pain. Onset of the symptoms was several weeks ago. Precipitating event: unk. Current symptoms include: ability to bear weight, but with some pain, stiffness, swelling and worsening symptoms after a period of activity. Aggravating factors: walking and certain shoegear. Symptoms have gradually worsened. Patient has had no prior foot problems. Evaluation to date: none. Treatment to date: avoidance of offending activity. Patients rates pain 7/10 on pain scale.        HPI    Last Podiatry Enc: Visit date not found  Last Enc w/ Me: Visit date not found    Outside reports reviewed: historical medical records.  Family hx: as below  Past Medical History:   Diagnosis Date    ALLERGIC RHINITIS     Chronic diarrhea     GERD (gastroesophageal reflux disease)     HEARING LOSS     Hypertension     Impaired fasting glucose     Osteopenia     Tuberculosis     childhood TB     Past Surgical History:   Procedure Laterality Date    COLONOSCOPY N/A 8/25/2017    Procedure: COLONOSCOPY;  Surgeon: Keny Lui MD;  Location: [address]);  Service: Endoscopy;  Laterality: N/A;    left 5th toe surgery      rt ear surgery      rt lung lobectomy and a rib removed      secondary to TB    TUBAL LIGATION       Family History   Problem Relation Name Age of Onset    Heart disease Mother          cabg    Kidney disease Mother          was on dialysis    Stroke Father      Cancer Brother          lung cancer    No Known Problems Son      Breast cancer Cousin Pat first cousin         paternal FIRST cousin    Ovarian cancer Neg Hx      Esophageal cancer Neg Hx      Colon cancer Neg Hx        Current Medications[1]  Review of patient's allergies indicates:   Allergen Reactions    Bempedoic acid      Patient developed myopathy    Levaquin [levofloxacin]      Heart raced and felt faint    Sulfa (sulfonamide antibiotics) Nausea And Vomiting    Tramadol Nausea And Vomiting     Social History[2]    ROS    REVIEW OF SYSTEMS: Negative as documented below as well as positive findings in bold.       Constitutional  Respiratory  Gastrointestinal  Skin   - Fever - Cough - Heartburn - Rash   - Chills - Spit blood - Nausea - Itching   - Weight Loss - Shortness of breath - Vomiting - Nail pain   - Malaise/Fatigue - Wheezing - Abdominal Pain  Wound/Ulcer   - Weight Gain   - Blood in Stool  Poor wound healing       - Diarrhea          Cardiovascular  Genitourinary  Neurological  HEENT   - Chest Pain - Dysuria - Burning Sensation of feet - Headache   - Palpitations - Hematuria - Tingling / Paresthesia - Congestion   - Pain at night in legs - Flank Pain - Dizziness - Sore Throat   - Cramping   - Tremor - Blurred Vision   - Leg Swelling   - Sensory Change - Double Vision   - Dizzy when standing   - Speech Change - Eye Redness       - Focal Weakness - Dry Eyes       - Loss of Consciousness          Endocrine  Musculoskeletal  Psychiatric   - Cold intolerance - Muscle Pain - Depression   - Heat intolerance - Neck Pain - Insomnia   - Anemia - Joint Pain - Memory Loss   -  Easy bruising, bleeding - Heel pain - Anxiety      Toe Pain        Leg/Ankle/Foot Pain         Objective:     Wt 69.3 kg (152 lb 12.5 oz)   BMI 25.42 kg/m²   Vitals:    02/19/25 1053   Weight: 69.3 kg (152 lb 12.5 oz)   PainSc:   5       Physical Exam    General Appearance:   Patient appears well developed, well nourished  Patient appears stated age    Psychiatric:   Patient is oriented to time, place, and person.  Patient has appropriate mood and affect    Neck:  Trachea Midline  No visible masses    Respiratory/Ears:  No distress or labored  breathing.  Able to differentiate between normal talking voice and whisper.  Able to follow commands    Eyes:  Visual Acuity intact  Lids and conjunctivae normal. No discoloration noted.    Foot Exam  Physical Exam  Ortho Exam  Ortho/SPM Exam  Physical Exam  Foot/Ankle Musculoskeletal Exam    L LE exam con't:  V:  DP 2/4, PT 2/4   CRT< 3s to all digits tested   Tibial and popliteal lymph nodes are w/o abnormality   Edema: absent, varicosities: absent    N:  Patient displays normal ankle reflexes   SILT in SP/DP/T/Magan/Saph distributions    Ortho: +Motor EHL/FHL/TA/GA   Observed enlarged bony prominence at the 2nd TMTJ   No major deformity present   There is mod decreased ROM at the 2nd TMT joints: pain is present, crepitus is present  There is moderate pain with palpation of 2nd TMTJ and PB insertion  Compartments soft/compressible. No pain on passive stretch of big toe. No calf  Pain.    Derm:  skin intact, skin warm and dry, skin without ulcers or lesions, skin without induration, nails normal, no erythema and no ecchymosis    Imaging / Labs:      X-Ray Foot Complete Left  Result Date: 2/19/2025  EXAMINATION: XR FOOT COMPLETE 3 VIEW LEFT CLINICAL HISTORY: .  Pain in left foot TECHNIQUE: AP, lateral and oblique views of the left foot were performed. FINDINGS: There is osseous spur at the plantar fascia attachment to the calcaneus.  There is no acute fracture, dislocation, or bony erosion.  There is a chronic deformity of the 5th toe.     As above. Electronically signed by: Brandan Rhodes MD Date:    02/19/2025 Time:    11:01        Note: This was dictated using a computer transcription program. Although proofread, it may contain computer transcription errors and phonetic errors. Other human proofreading errors may also exist. Corrections may be performed at a later time. Please contact us for any clarification if needed.    Gera Richter DPM  [facility]           [1]  Current Outpatient  Medications   Medication Sig Dispense Refill    aspirin (ECOTRIN) 81 MG EC tablet Take 81 mg by mouth once daily.      blood sugar diagnostic Strp To check BG 1 times daily, to use with insurance preferred meter 100 each 2    diazePAM (VALIUM) 5 MG tablet Take 1 tablet (5 mg total) by mouth daily as needed for Anxiety. 30 tablet 0    diphenoxylate-atropine 2.5-0.025 mg (LOMOTIL) 2.5-0.025 mg per tablet Take 1 tablet by mouth 4 (four) times daily as needed for Diarrhea. 120 tablet 3    EScitalopram oxalate (LEXAPRO) 20 MG tablet Take 1 tablet (20 mg total) by mouth every evening. 90 tablet 3    fluticasone propionate (FLONASE) 50 mcg/actuation nasal spray 2 sprays (100 mcg total) by Each Nostril route once daily. 9.9 mL 3    lancets Misc To check BG 1 times daily, to use with insurance preferred meter 100 each 2    levothyroxine (SYNTHROID) 88 MCG tablet Take 1 tablet (88 mcg total) by mouth once daily. 90 tablet 3    losartan-hydrochlorothiazide 100-12.5 mg (HYZAAR) 100-12.5 mg Tab Take 1 tablet by mouth once daily. 90 tablet 3    meloxicam (MOBIC) 7.5 MG tablet Take 1 tablet (7.5 mg total) by mouth once daily. 90 tablet 1    multivit-min-iron-FA-lutein 8 mg iron-400 mcg-300 mcg Tab Take 1 tablet by mouth once daily.      neomycin-polymyxin-hydrocortisone (CORTISPORIN) 3.5-10,000-1 mg/mL-unit/mL-% otic suspension Place 3 drops into the right ear 4 (four) times daily. 10 mL 5    omega-3 fatty acids/fish oil (FISH OIL-OMEGA-3 FATTY ACIDS) 300-1,000 mg capsule Take 1 capsule by mouth once daily.      phenazopyridine (PYRIDIUM) 200 MG tablet TAKE ONE TABLET BY MOUTH THREE TIMES A DAY AS NEEDED FOR BLADDER PAIN 30 tablet 2    pitavastatin calcium (LIVALO) 1 mg Tab tablet Take 1 tablet (1 mg total) by mouth every evening. 90 tablet 3    azelastine (ASTELIN) 137 mcg (0.1 %) nasal spray 1 spray (137 mcg total) by Nasal route 2 (two) times daily. 30 mL 11    blood-glucose meter kit To check BG 1 times daily,  to use with insurance preferred meter 1 each 0    methylPREDNISolone (MEDROL DOSEPACK) 4 mg tablet use as directed 21 each 0     Current Facility-Administered Medications   Medication Dose Route Frequency Provider Last Rate Last Admin    ketorolac injection 30 mg  30 mg Intramuscular 1 time in Clinic/HOD        [2]  Social History  Socioeconomic History    Marital status:    Occupational History    Occupation: retired teller   Tobacco Use    Smoking status: Never    Smokeless tobacco: Never   Substance and Sexual Activity    Alcohol use: Yes     Alcohol/week: 1.0 standard drink of alcohol     Types: 1 Glasses of wine per week     Comment: maybe 1--2 monthly    Drug use: No    Sexual activity: Not Currently     Comment:    Social History Narrative    She exercises regularly.

## 2025-02-19 NOTE — PATIENT INSTRUCTIONS
Peroneal Tendon Injuries  What Are the Peroneal Tendons?         A tendon is a band of tissue that connects a muscle to a bone. The two peroneal tendons in the foot run side by side behind the outer ankle bone. One peroneal tendon attaches to the outer part of the midfoot, while the other tendon runs under the foot and attaches near the inside of the arch. The main function of the peroneal tendons is to stabilize the foot and ankle and protect them from sprains.    Causes & Symptoms of Peroneal Tendon Injuries  Peroneal tendon injuries may be acute (occurring suddenly) or chronic (developing over a period of time). They most commonly occur in individuals who participate in sports that involve repetitive ankle motion. In addition, people with higher arches are at risk for developing peroneal tendon injuries. Basic types of peroneal tendon injuries are tendonitis, tears and subluxation.    Tendonitis is an inflammation of one or both tendons. The inflammation is caused by activities involving repetitive use of the tendon, overuse of the tendon or trauma (such as an ankle sprain). Symptoms of tendonitis include:    Pain  Swelling  Warm to the touch     Acute tears are caused by repetitive activity or trauma. Immediate symptoms of acute tears include:    Pain  Swelling  Weakness or instability of the foot and ankle     As time goes on, these tears may lead to a change in the shape of the foot in which the arch may become higher.    Degenerative tears (tendonosis) are usually due to overuse and occur over long periods of time, often years. In degenerative tears, the tendon is like taffy that has been overstretched until it becomes thin and eventually frays. Having high arches also puts you at risk for developing a degenerative tear. The symptoms of degenerative tears may include:    Sporadic pain (occurring from time to time) on the outside of the ankle  Weakness or instability in the ankle  An increase in the height of  the arch     Subluxation means one or both tendons have slipped out of their normal position. In some cases, subluxation is due to a condition in which a person is born with a variation in the shape of the bone or muscle. In other cases, subluxation occurs following trauma, such as an ankle sprain. Damage or injury to the tissues that stabilize the tendons (retinaculum) can lead to chronic tendon subluxation. The symptoms of subluxation may include:    A snapping feeling of the tendon around the ankle bone  Sporadic pain behind the outside ankle bone  Ankle instability or weakness     Early treatment of a subluxation is critical since a tendon that continues to sublux (move out of position) is more likely to tear or rupture. Therefore, if you feel the characteristic snapping, see a foot and ankle surgeon immediately.    Diagnosis  Because peroneal tendon injuries are sometimes misdiagnosed and may worsen without proper treatment, prompt evaluation by a foot and ankle surgeon is advised. To diagnose a peroneal tendon injury, the surgeon will examine the foot and look for pain, instability, swelling, warmth and weakness on the outer side of the ankle. In addition, an x-ray or other advanced imaging studies may be needed to fully evaluate the injury. The foot and ankle surgeon will also look for signs of an ankle sprain and other related injuries that sometimes accompany a peroneal tendon injury. Proper diagnosis is important because prolonged discomfort after a simple sprain may be a sign of additional problems.    Nonsurgical Treatment  Treatment depends on the type of peroneal tendon injury. Options include:    Immobilization. A cast or splint may be used to keep the foot and ankle from moving and allow the injury to heal.  Medications. Oral or injected anti-inflammatory drugs may help relieve pain and inflammation.  Physical therapy. Ice, heat or ultrasound therapy may be used to reduce swelling and pain. As  symptoms improve, exercises can be added to strengthen the muscles and improve range of motion and balance.  Bracing. The surgeon may provide a brace to use for a short while or during activities requiring repetitive ankle motion. Bracing may also be an option when a patient is not a candidate for surgery.     When Is Surgery Needed?  In some cases, surgery may be needed to repair the tendon or tendons and perhaps the supporting structures of the foot. The foot and ankle surgeon will determine the most appropriate procedure for the patients condition and lifestyle. After surgery, physical therapy is an important part of rehabilitation.      What is Tendonitis of the Foot?  When you use a set of muscles too much, youre likely to strain the tendons (soft tissues) that connect those muscles to your bones. At first, pain or swelling may come and go quickly. But if you do too much too soon, your muscles may overtire again. The strain may cause a tendons outer covering to swell or small fibers in a tendon to pull apart. If you keep pushing your muscles, damage to the tendons adds up, and tendonitis develops. Over time, pain and swelling may limit your activities. But with your doctors help, tendonitis can be controlled. Both your symptoms and your risk of future problems including tendon rupture can be reduced.       The back of your foot  The Achilles tendon connects the calf muscle to the heel bone. If tendonitis occurs here, you may feel pain when your foot touches down or when your heel lifts off the ground.   The front of your foot  The anterior tibial tendon helps control the front of your foot when it meets the ground. If this tendon is strained, you may feel pain when you go down stairs or walk or run on hills.     The inside of your foot  The posterior tibial tendon runs along the inside of the ankle and foot. If this tendon is strained, your foot may hurt when it moves forward to push off the ground. Or you  may feel pain when your heel shifts from side to side.   The outside of your foot  The peroneal tendon wraps across the bottom of your foot, from the outside to the inside. Tendonitis here may cause pain when you stand or push off the ground and when walking on uneven surfaces.   Date Last Reviewed: 9/21/2015  © 6211-9253 [employer]. [address] [place] [address]. All rights reserved. This information is not intended as a substitute for professional medical care. Always follow your healthcare professional's instructions.        Treating Tendonitis of the Foot  Your healthcare provider's first concern is to reduce your symptoms. Using ice and heat, taking medicines, and limiting activity help control pain and swelling. Follow all of your healthcare provider's instructions. Returning to activity too soon may cause your symptoms to come back.    Ice and heat  Ice helps prevent swelling and reduce pain. Place ice on the painful area for 10 to 15 minutes. Repeat the icing several times a day. If ?you have had the problem for a while, using heat may help. Apply a heating pad or hot towels to the tendon for 20 to 30 minutes 2 or 3 times a day.  Medicines  Your healthcare provider may tell you to take ibuprofen or other anti-inflammatory medicines. These reduce pain and swelling. Take them as directed. Dont wait until you feel pain. In more severe cases, cortisone may be injected to relieve pain.  Limiting activities  Rest allows the tissues in your foot to heal. Stay off your feet for a few days, then slowly work back into activity. If you do high-impact activities, such as running or aerobics, try other activities that place less strain on your foot. Cycling and swimming are good choices.  Date Last Reviewed: 9/21/2015  © 1965-8597 [employer]. [address] [place] [address]. All rights reserved. This information is not intended as a substitute for professional medical  care. Always follow your healthcare professional's instructions.

## 2025-02-20 ENCOUNTER — TELEPHONE (OUTPATIENT)
Dept: PODIATRY | Facility: CLINIC | Age: 80
End: 2025-02-20
Payer: MEDICARE

## 2025-02-20 NOTE — TELEPHONE ENCOUNTER
----- Message from Desi sent at 2/20/2025  3:50 PM CST -----  Type:  Needs Medical AdviceWho Called: ptWould the patient rather a call back or a response via [facility]? callBest Call Back Number: [phone] Information: pt wanting to speak regarding a later time for 3.19

## 2025-02-20 NOTE — TELEPHONE ENCOUNTER
Called and spoke to patient. Rescheduled appt with patient to following week at a later time. Patient understood time, date, and location.

## 2025-02-21 DIAGNOSIS — Z00.00 ENCOUNTER FOR MEDICARE ANNUAL WELLNESS EXAM: ICD-10-CM

## 2025-03-14 ENCOUNTER — TELEPHONE (OUTPATIENT)
Dept: GASTROENTEROLOGY | Facility: CLINIC | Age: 80
End: 2025-03-14
Payer: MEDICARE

## 2025-03-17 ENCOUNTER — OFFICE VISIT (OUTPATIENT)
Dept: GASTROENTEROLOGY | Facility: CLINIC | Age: 80
End: 2025-03-17
Payer: MEDICARE

## 2025-03-17 VITALS
DIASTOLIC BLOOD PRESSURE: 73 MMHG | HEART RATE: 62 BPM | BODY MASS INDEX: 26.77 KG/M2 | WEIGHT: 160.69 LBS | HEIGHT: 65 IN | SYSTOLIC BLOOD PRESSURE: 185 MMHG

## 2025-03-17 DIAGNOSIS — K52.9 CHRONIC DIARRHEA: Primary | ICD-10-CM

## 2025-03-17 PROCEDURE — 99214 OFFICE O/P EST MOD 30 MIN: CPT | Mod: S$PBB,,, | Performed by: INTERNAL MEDICINE

## 2025-03-17 PROCEDURE — 99999 PR PBB SHADOW E&M-EST. PATIENT-LVL III: CPT | Mod: PBBFAC,,, | Performed by: INTERNAL MEDICINE

## 2025-03-17 PROCEDURE — 99213 OFFICE O/P EST LOW 20 MIN: CPT | Mod: PBBFAC | Performed by: INTERNAL MEDICINE

## 2025-03-17 NOTE — PROGRESS NOTES
Reason for visit: Diarrhea     HPI:  Ms. Gleason is a 79-year-old lady last seen in March 2024 for chronic diarrhea. She has HTN, HLP and hypothyroidism. She had been complaining of loose bowel movements with urge fecal incontinence, which has not responded to probiotics or Bentyl. In the past, she has undergone stool testing for C. diff, routine cultures, Giardia, negative ova, parasites and no evidence of white cells.  She has also been tested for pancreatic insufficiency with stool for fecal elastase and was negative.  Prior colonoscopy for screening from 2017 was unremarkable.  She had previously seen the surgeon, Dr. Lui, for fecal urgency and digital rectal exam revealed good tone and defecography was normal as well.  Previously we had discussed about repeating colonoscopy with biopsies to evaluate for microscopic colitis, however she did not want to pursue it. No dysphagia or odynophagia. No heartburn or acid regurgitation. Has occasional choking with liquids. No trouble with solids.     Her symptoms are well controlled on Lomotil 1 tablet 4 times a day (9 am -12 noon-3 pm-6 pm).  She has also been taking Metamucil 1 teaspoon once a week. She has noted that her stools are more formed and she moves her bowels once or twice a day now. She is tolerating Lomotil well with no side effects. Denies any dysphagia, odynophagia, nausea, vomiting, heartburn or acid regurgitation. No abdominal pains, changes in bowel pattern, blood/ mucus in stool or unintentional weight loss. No melena or maroon stools. No recent changes in diet or medications. No family history of IBD, Celiac disease or GI malignancy.  No alcohol or tobacco use. No recent antibiotic use, travels or sick contacts. No prior history of C.diff.     Continues to do well. No worsening of diarrhea. Just started meloxicam once daily for arthritis a month ago.     Past medical, surgical, social and family history reviewed in epic     Medication allergies  reviewed in epic     Review of systems:     Constitutional:  No fever, no chills, no weight loss, appetite is normal  Eyes:  No visual changes or red eyes  ENT:  No odynophagia or hoarseness of voice  Cardiovascular:  No angina or palpitation  Respiratory:  No shortness breath or wheezing  Genitourinary:  No dysuria or frequency  Musculoskeletal:  No myalgias; back and left foot arthralgias  Skin:  No pruritus or eczema  Neurologic:  No headache or seizures  Psychiatric:  No anxiety depression  Gastrointestinal:  See [facility]     Physical exam:  Vitals see epic, awake, alert, oriented x3 in no acute distress     Neck:  Supple, no carotid bruit, no cervical adenopathy  Abdomen:  Flat, soft, nontender, nondistended, no masses palpable, no hepatosplenomegaly detected, bowel sounds are normal, no ascites clinically detectable  Eyes:  Conjunctivae anicteric, not injected  ENT:  Oral mucosa moist  Cardiovascular:  S1, S2 normal, no murmurs, no gallops, no abdominal bruits heard  Respiratory:  Bilateral air entry equal, no rhonchi, no crackles, normal effort  Skin:  No palmar erythema or spider angiomata  Neurologic:  No asterixis or tremors  Psychiatric:  Affect appropriate, proper judgment, proper insight, oriented to place and time  Lower extremities:  No pedal edema; left foot in boots     Prior labs, imaging and colonoscopy (2017) reports reviewed.        Impression: Chronic diarrhea well controlled on Lomotil 3 times daily with Metamucil 1 tbsp once a week.      Recommendations:       Continue metamucil 1 teaspoon daily and Lomotil 1 qid for diarrhea (medication needs to be faxed to [facility]; will do 90 day supply the next time)  2.  Follow up in 1 year or sooner with any issues.

## 2025-03-18 ENCOUNTER — OFFICE VISIT (OUTPATIENT)
Facility: CLINIC | Age: 80
End: 2025-03-18
Payer: MEDICARE

## 2025-03-18 VITALS
DIASTOLIC BLOOD PRESSURE: 72 MMHG | BODY MASS INDEX: 26.63 KG/M2 | WEIGHT: 159.81 LBS | HEIGHT: 65 IN | HEART RATE: 63 BPM | SYSTOLIC BLOOD PRESSURE: 144 MMHG

## 2025-03-18 DIAGNOSIS — Z01.419 ENCNTR FOR GYN EXAM (GENERAL) (ROUTINE) W/O ABN FINDINGS: Primary | ICD-10-CM

## 2025-03-18 PROCEDURE — 99999 PR PBB SHADOW E&M-EST. PATIENT-LVL IV: CPT | Mod: PBBFAC,,, | Performed by: STUDENT IN AN ORGANIZED HEALTH CARE EDUCATION/TRAINING PROGRAM

## 2025-03-18 PROCEDURE — G0101 CA SCREEN;PELVIC/BREAST EXAM: HCPCS | Mod: S$PBB,,, | Performed by: STUDENT IN AN ORGANIZED HEALTH CARE EDUCATION/TRAINING PROGRAM

## 2025-03-18 PROCEDURE — 99214 OFFICE O/P EST MOD 30 MIN: CPT | Mod: PBBFAC,PN | Performed by: STUDENT IN AN ORGANIZED HEALTH CARE EDUCATION/TRAINING PROGRAM

## 2025-03-18 PROCEDURE — G0101 CA SCREEN;PELVIC/BREAST EXAM: HCPCS | Mod: PBBFAC,PN | Performed by: STUDENT IN AN ORGANIZED HEALTH CARE EDUCATION/TRAINING PROGRAM

## 2025-03-18 NOTE — PROGRESS NOTES
Subjective:    Patient ID: Elizabeth Gleason is a 79 y.o. y.o. female.     Chief Complaint: Annual Well Woman Exam     History of Present Illness:  Elizabeth presents today for Annual Well Woman exam. She describes her menses as  absent .She denies pelvic pain.  She denies breast tenderness, masses, nipple discharge. She denies difficulty with urination or bowel movements. She denies menopausal symptoms such as hotflashes, vaginal dryness, and night sweats. She denies bloating, early satiety, or weight changes. She is not currently sexually active.     Menstrual History:   No LMP recorded. Patient is postmenopausal..     OB History          1    Para   1    Term   1            AB        Living   1         SAB        IAB        Ectopic        Multiple        Live Births   1                 The following portions of the patient's history were reviewed and updated as appropriate: allergies, current medications, past family history, past medical history, past social history, past surgical history, and problem list.    ROS:   CONSTITUTIONAL: Negative for fever, chills, diaphoresis, weakness, fatigue, weight loss, weight gain  ENT: negative for sore throat, nasal congestion, nasal discharge, epistaxis, tinnitus, hearing loss  EYES: negative for blurry vision, decreased vision, loss of vision, eye pain, diplopia, photophobia, discharge  SKIN: Negative for rash, itching, hives  RESPIRATORY: negative for cough, hemoptysis, shortness of breath, pleuritic chest pain, wheezing  CARDIOVASCULAR: negative for chest pain, dyspnea on exertion, orthopnea, paroxysmal nocturnal dyspnea, edema, palpitations  BREAST: negative for breast  tenderness, breast mass, nipple discharge, or skin changes  GASTROINTESTINAL: negative for abdominal pain, flank pain, nausea, vomiting, diarrhea, constipation, black stool, blood in stool  GENITOURINARY: negative for abnormal vaginal bleeding, amenorrhea, decreased libido, dysuria,  genital sores, hematuria, incontinence, menorrhagia, pelvic pain, urinary frequency, vaginal discharge  HEMATOLOGIC/LYMPHATIC: negative for swollen lymph nodes, bleeding, bruising  MUSCULOSKELETAL: negative for back pain, joint pain, joint stiffness, joint swelling, muscle pain, muscle weakness  NEUROLOGICAL: negative for dizzy/vertigo, headache, focal weakness, numbness/tingling, speech problems, loss of consciousness, confusion, memory loss  BEHAVORIAL/PSYCH: negative for anxiety, depression, psychosis  ENDOCRINE: negative for polydipsia/polyuria, palpitations, skin changes, temperature intolerance, unexpected weight changes  ALLERGIC/IMMUNOLOGIC: negative for urticaria, hay fever, angioedema      Objective:    Vital Signs:  Vitals:    03/18/25 1303   BP: (!) 144/72   Pulse: 63       Physical Exam:  General:  alert, cooperative, no distress   Skin:  Skin color, texture, turgor normal. No rashes or lesions   HEENT:  conjunctivae/corneas clear. PERRL.   Neck: supple, trachea midline, no adenopathy or thyromegally   Respiratory:  Normal effort   Breasts:  no discharge, erythema, tenderness, or palpable masses; no axillary lymphadenopathy   Abdomen:  soft, nontender, no palpable masses   Pelvis: External genitalia: normal general appearance  Urinary system: urethral meatus normal, bladder nontender  Vaginal: atrophic mucosa  Cervix: normal appearance  Uterus: normal size, shape, position  Adnexa: normal size, nontender bilaterally   Extremities: Normal ROM; no edema, no cyanosis   Neurologial: Normal strength and tone. No focal numbness or weakness.   Psychiatric: normal mood, speech, dress, and thought processes       Assessment:       Healthy female exam.     1. Encntr for gyn exam (general) (routine) w/o abn findings          Plan:      Problem List Items Addressed This Visit    None  Visit Diagnoses         Encntr for gyn exam (general) (routine) w/o abn findings    -  Primary            COUNSELING:  Elizabeth was  counseled on [facility]  recommendations for yearly pelvic exams in addition to recommendations for monthly self breast exams; to see her PCP for other health maintenance.

## 2025-03-24 ENCOUNTER — RESULTS FOLLOW-UP (OUTPATIENT)
Dept: OBSTETRICS AND GYNECOLOGY | Facility: CLINIC | Age: 80
End: 2025-03-24

## 2025-03-26 ENCOUNTER — OFFICE VISIT (OUTPATIENT)
Dept: PODIATRY | Facility: CLINIC | Age: 80
End: 2025-03-26
Payer: MEDICARE

## 2025-03-26 DIAGNOSIS — M76.72 PERONEAL TENDONITIS OF LEFT LOWER EXTREMITY: Primary | ICD-10-CM

## 2025-03-26 PROCEDURE — 99213 OFFICE O/P EST LOW 20 MIN: CPT | Mod: PBBFAC,PN | Performed by: PODIATRIST

## 2025-03-26 PROCEDURE — 99999 PR PBB SHADOW E&M-EST. PATIENT-LVL III: CPT | Mod: PBBFAC,,, | Performed by: PODIATRIST

## 2025-03-26 PROCEDURE — 99213 OFFICE O/P EST LOW 20 MIN: CPT | Mod: S$PBB,,, | Performed by: PODIATRIST

## 2025-03-26 RX ORDER — DICLOFENAC SODIUM 10 MG/G
2 GEL TOPICAL 4 TIMES DAILY
Qty: 100 G | Refills: 2 | Status: SHIPPED | OUTPATIENT
Start: 2025-03-26 | End: 2025-04-05

## 2025-03-26 NOTE — PATIENT INSTRUCTIONS
Peroneal Tendon Injuries  What Are the Peroneal Tendons?         A tendon is a band of tissue that connects a muscle to a bone. The two peroneal tendons in the foot run side by side behind the outer ankle bone. One peroneal tendon attaches to the outer part of the midfoot, while the other tendon runs under the foot and attaches near the inside of the arch. The main function of the peroneal tendons is to stabilize the foot and ankle and protect them from sprains.    Causes & Symptoms of Peroneal Tendon Injuries  Peroneal tendon injuries may be acute (occurring suddenly) or chronic (developing over a period of time). They most commonly occur in individuals who participate in sports that involve repetitive ankle motion. In addition, people with higher arches are at risk for developing peroneal tendon injuries. Basic types of peroneal tendon injuries are tendonitis, tears and subluxation.    Tendonitis is an inflammation of one or both tendons. The inflammation is caused by activities involving repetitive use of the tendon, overuse of the tendon or trauma (such as an ankle sprain). Symptoms of tendonitis include:    Pain  Swelling  Warm to the touch     Acute tears are caused by repetitive activity or trauma. Immediate symptoms of acute tears include:    Pain  Swelling  Weakness or instability of the foot and ankle     As time goes on, these tears may lead to a change in the shape of the foot in which the arch may become higher.    Degenerative tears (tendonosis) are usually due to overuse and occur over long periods of time, often years. In degenerative tears, the tendon is like taffy that has been overstretched until it becomes thin and eventually frays. Having high arches also puts you at risk for developing a degenerative tear. The symptoms of degenerative tears may include:    Sporadic pain (occurring from time to time) on the outside of the ankle  Weakness or instability in the ankle  An increase in the height of  the arch     Subluxation means one or both tendons have slipped out of their normal position. In some cases, subluxation is due to a condition in which a person is born with a variation in the shape of the bone or muscle. In other cases, subluxation occurs following trauma, such as an ankle sprain. Damage or injury to the tissues that stabilize the tendons (retinaculum) can lead to chronic tendon subluxation. The symptoms of subluxation may include:    A snapping feeling of the tendon around the ankle bone  Sporadic pain behind the outside ankle bone  Ankle instability or weakness     Early treatment of a subluxation is critical since a tendon that continues to sublux (move out of position) is more likely to tear or rupture. Therefore, if you feel the characteristic snapping, see a foot and ankle surgeon immediately.    Diagnosis  Because peroneal tendon injuries are sometimes misdiagnosed and may worsen without proper treatment, prompt evaluation by a foot and ankle surgeon is advised. To diagnose a peroneal tendon injury, the surgeon will examine the foot and look for pain, instability, swelling, warmth and weakness on the outer side of the ankle. In addition, an x-ray or other advanced imaging studies may be needed to fully evaluate the injury. The foot and ankle surgeon will also look for signs of an ankle sprain and other related injuries that sometimes accompany a peroneal tendon injury. Proper diagnosis is important because prolonged discomfort after a simple sprain may be a sign of additional problems.    Nonsurgical Treatment  Treatment depends on the type of peroneal tendon injury. Options include:    Immobilization. A cast or splint may be used to keep the foot and ankle from moving and allow the injury to heal.  Medications. Oral or injected anti-inflammatory drugs may help relieve pain and inflammation.  Physical therapy. Ice, heat or ultrasound therapy may be used to reduce swelling and pain. As  symptoms improve, exercises can be added to strengthen the muscles and improve range of motion and balance.  Bracing. The surgeon may provide a brace to use for a short while or during activities requiring repetitive ankle motion. Bracing may also be an option when a patient is not a candidate for surgery.     When Is Surgery Needed?  In some cases, surgery may be needed to repair the tendon or tendons and perhaps the supporting structures of the foot. The foot and ankle surgeon will determine the most appropriate procedure for the patients condition and lifestyle. After surgery, physical therapy is an important part of rehabilitation.      What is Tendonitis of the Foot?  When you use a set of muscles too much, youre likely to strain the tendons (soft tissues) that connect those muscles to your bones. At first, pain or swelling may come and go quickly. But if you do too much too soon, your muscles may overtire again. The strain may cause a tendons outer covering to swell or small fibers in a tendon to pull apart. If you keep pushing your muscles, damage to the tendons adds up, and tendonitis develops. Over time, pain and swelling may limit your activities. But with your doctors help, tendonitis can be controlled. Both your symptoms and your risk of future problems including tendon rupture can be reduced.       The back of your foot  The Achilles tendon connects the calf muscle to the heel bone. If tendonitis occurs here, you may feel pain when your foot touches down or when your heel lifts off the ground.   The front of your foot  The anterior tibial tendon helps control the front of your foot when it meets the ground. If this tendon is strained, you may feel pain when you go down stairs or walk or run on hills.     The inside of your foot  The posterior tibial tendon runs along the inside of the ankle and foot. If this tendon is strained, your foot may hurt when it moves forward to push off the ground. Or you  may feel pain when your heel shifts from side to side.   The outside of your foot  The peroneal tendon wraps across the bottom of your foot, from the outside to the inside. Tendonitis here may cause pain when you stand or push off the ground and when walking on uneven surfaces.   Date Last Reviewed: 9/21/2015  © 2073-0708 [employer]. [address] [place] [address]. All rights reserved. This information is not intended as a substitute for professional medical care. Always follow your healthcare professional's instructions.        Treating Tendonitis of the Foot  Your healthcare provider's first concern is to reduce your symptoms. Using ice and heat, taking medicines, and limiting activity help control pain and swelling. Follow all of your healthcare provider's instructions. Returning to activity too soon may cause your symptoms to come back.    Ice and heat  Ice helps prevent swelling and reduce pain. Place ice on the painful area for 10 to 15 minutes. Repeat the icing several times a day. If ?you have had the problem for a while, using heat may help. Apply a heating pad or hot towels to the tendon for 20 to 30 minutes 2 or 3 times a day.  Medicines  Your healthcare provider may tell you to take ibuprofen or other anti-inflammatory medicines. These reduce pain and swelling. Take them as directed. Dont wait until you feel pain. In more severe cases, cortisone may be injected to relieve pain.  Limiting activities  Rest allows the tissues in your foot to heal. Stay off your feet for a few days, then slowly work back into activity. If you do high-impact activities, such as running or aerobics, try other activities that place less strain on your foot. Cycling and swimming are good choices.  Date Last Reviewed: 9/21/2015  © 4953-0398 [employer]. [address] [place] [address]. All rights reserved. This information is not intended as a substitute for professional medical  care. Always follow your healthcare professional's instructions.

## 2025-03-26 NOTE — PROGRESS NOTES
[facility] - PODIATRY  [address]  [address]  [place] [address]  Dept: [phone]  Dept Fax: [phone]    Gera Richter Jr., DPM     Assessment:   MDM    Coding  1. Peroneal tendonitis of left lower extremity  Ambulatory Referral/Consult to Physical Therapy    diclofenac sodium (VOLTAREN) 1 % Gel          Plan:     Procedures  1. Peroneal tendonitis of left lower extremity  -     Ambulatory Referral/Consult to Physical Therapy; Future; Expected date: 04/02/2025  -     diclofenac sodium (VOLTAREN) 1 % Gel; Apply 2 g topically 4 (four) times daily. for 10 days  Dispense: 100 g; Refill: 2      Elizabeth was seen today for follow-up.    Diagnoses and all orders for this visit:    Peroneal tendonitis of left lower extremity  -     Ambulatory Referral/Consult to Physical Therapy; Future  -     diclofenac sodium (VOLTAREN) 1 % Gel; Apply 2 g topically 4 (four) times daily. for 10 days        -pt seen, evaluated, and managed  -dx discussed in detail. All questions/concerns addressed  -all tx options discussed. All alternatives, risks, benefits of all txs discussed  -We discussed conservative care options possible including but not limited to shoe wear and/or padding, bracing/strapping, at home ROM, formal PT, medical therapy, injection therapy  - The utilization of NSAIDs can be considered but their benefit has to be tempered against the risk of GI/ concerns  - A steroid injection can be undertaken.  We did discuss the potential mechanism of action of this shot.  Understanding that multiple injections at the same anatomic site do have deleterious effects on the soft tissue.  Generic risks include: steroid flare (advised to ice if necessary), skin hypo-pgimentation (which can be permanent and unsightly), elevation of blood sugar, subcutaneous atrophy (can be permanent) and infection.   -XR/imaging reviewed by me: agree with read  -labs reviewed by me: michael or medrol  dose pack  -implemented icing/stretching regimen  -ASO dispensed    -rxs dispensed: vgel  -referrals:  PT  -WB: wbat in ASO RLE x 4 wks then wean from ASO      Follow up if symptoms worsen or fail to improve.    Subjective:      Patient ID: Elizabeth Gleason is a 79 y.o. female.    Chief Complaint:   Chief Complaint   Patient presents with    Follow-up       CC - foot pain: patient presents to the podiatry clinic  with complaint of  left foot pain. Onset of the symptoms was several weeks ago. Precipitating event: unk. Current symptoms include: ability to bear weight, but with some pain, stiffness, swelling and worsening symptoms after a period of activity. Aggravating factors: walking and certain shoegear. Symptoms have gradually worsened. Patient has had no prior foot problems. Evaluation to date: none. Treatment to date: avoidance of offending activity. Patients rates pain 7/10 on pain scale.        3/26/25:  Hx as above. F/u for PL/pb tendonitis. Been in [facility] since last visit. Pain improving.        Last Podiatry Enc: 2/19/2025  Last Enc w/ Me: Visit date not found    Outside reports reviewed: historical medical records.  Family hx: as below  Past Medical History:   Diagnosis Date    ALLERGIC RHINITIS     Chronic diarrhea     GERD (gastroesophageal reflux disease)     HEARING LOSS     Hypertension     Impaired fasting glucose     Osteopenia     Tuberculosis     childhood TB     Past Surgical History:   Procedure Laterality Date    COLONOSCOPY N/A 8/25/2017    Procedure: COLONOSCOPY;  Surgeon: Keny Lui MD;  Location: [address];  Service: Endoscopy;  Laterality: N/A;    left 5th toe surgery      rt ear surgery      rt lung lobectomy and a rib removed      secondary to TB    TUBAL LIGATION       Family History   Problem Relation Name Age of Onset    Heart disease Mother          cabg    Kidney disease Mother          was on dialysis    Stroke Father      Cancer Brother          lung cancer    No  Known Problems Son      Breast cancer Cousin Pat first cousin         paternal FIRST cousin    Ovarian cancer Neg Hx      Esophageal cancer Neg Hx      Colon cancer Neg Hx       Current Medications[1]  Review of patient's allergies indicates:   Allergen Reactions    Bempedoic acid      Patient developed myopathy    Levaquin [levofloxacin]      Heart raced and felt faint    Sulfa (sulfonamide antibiotics) Nausea And Vomiting    Tramadol Nausea And Vomiting     Social History[2]    ROS    REVIEW OF SYSTEMS: Negative as documented below as well as positive findings in bold.       Constitutional  Respiratory  Gastrointestinal  Skin   - Fever - Cough - Heartburn - Rash   - Chills - Spit blood - Nausea - Itching   - Weight Loss - Shortness of breath - Vomiting - Nail pain   - Malaise/Fatigue - Wheezing - Abdominal Pain  Wound/Ulcer   - Weight Gain   - Blood in Stool  Poor wound healing       - Diarrhea          Cardiovascular  Genitourinary  Neurological  HEENT   - Chest Pain - Dysuria - Burning Sensation of feet - Headache   - Palpitations - Hematuria - Tingling / Paresthesia - Congestion   - Pain at night in legs - Flank Pain - Dizziness - Sore Throat   - Cramping   - Tremor - Blurred Vision   - Leg Swelling   - Sensory Change - Double Vision   - Dizzy when standing   - Speech Change - Eye Redness       - Focal Weakness - Dry Eyes       - Loss of Consciousness          Endocrine  Musculoskeletal  Psychiatric   - Cold intolerance - Muscle Pain - Depression   - Heat intolerance - Neck Pain - Insomnia   - Anemia - Joint Pain - Memory Loss   -  Easy bruising, bleeding - Heel pain - Anxiety      Toe Pain        Leg/Ankle/Foot Pain         Objective:     There were no vitals taken for this visit.  Vitals:    03/26/25 1119   PainSc:   3   PainLoc: Foot         Physical Exam    General Appearance:   Patient appears well developed, well nourished  Patient appears stated age    Psychiatric:   Patient is oriented to time, place,  and person.  Patient has appropriate mood and affect    Neck:  Trachea Midline  No visible masses    Respiratory/Ears:  No distress or labored breathing.  Able to differentiate between normal talking voice and whisper.  Able to follow commands    Eyes:  Visual Acuity intact  Lids and conjunctivae normal. No discoloration noted.    Foot Exam  Physical Exam  Ortho Exam  Ortho/SPM Exam  Physical Exam  Foot/Ankle Musculoskeletal Exam    L LE exam con't:  V:  DP 2/4, PT 2/4   CRT< 3s to all digits tested   Tibial and popliteal lymph nodes are w/o abnormality   Edema: absent, varicosities: absent    N:  Patient displays normal ankle reflexes   SILT in SP/DP/T/Magan/Saph distributions    Ortho: +Motor EHL/FHL/TA/GA   Observed enlarged bony prominence at the 2nd TMTJ   No major deformity present   There is mod decreased ROM at the 2nd TMT joints: pain is present, crepitus is present  There is moderate pain with palpation of 2nd TMTJ and PB insertion  Compartments soft/compressible. No pain on passive stretch of big toe. No calf  Pain.    Derm:  skin intact, skin warm and dry, skin without ulcers or lesions, skin without induration, nails normal, no erythema and no ecchymosis    Imaging / Labs:      Mammo Digital Screening Bilat w/ Edwin  Result Date: 3/21/2025  Facility: [address] [address] [phone] Name: Elizabeth Gleason MRN: [id number] Result: Mammo Digital Screening Bilat w/ Edwin History: Patient is 79 y.o. and is seen for a screening mammogram. Films Compared: Compared to: 03/12/2024 Mammo Digital Screening Bilat w/ Edwin and 01/25/2023 Mammo Digital Screening Bilat w/ Edwin Findings: This procedure was performed using tomosynthesis. Computer-aided detection was utilized in the interpretation of this examination. The breasts are heterogeneously dense, which may obscure small masses. There is no evidence of suspicious masses, microcalcifications or architectural  distortion.      No mammographic evidence of malignancy. BI-RADS Category 1: Negative Recommendation: Routine screening mammogram in 1 year is recommended. Your estimated lifetime risk of breast cancer (to age 85) based on Tyrer-Cuzick risk assessment model is 1.81%.  According to the American Cancer Society, patients with a lifetime breast cancer risk of 20% or higher might benefit from supplemental screening tests, such as screening breast MRI. Electronically signed by, Brandan Aggarwal MD         Note: This was dictated using a computer transcription program. Although proofread, it may contain computer transcription errors and phonetic errors. Other human proofreading errors may also exist. Corrections may be performed at a later time. Please contact us for any clarification if needed.    Robert Marroquin, DPM [facility]         [1]   Current Outpatient Medications   Medication Sig Dispense Refill    aspirin (ECOTRIN) 81 MG EC tablet Take 81 mg by mouth once daily.      azelastine (ASTELIN) 137 mcg (0.1 %) nasal spray 1 spray (137 mcg total) by Nasal route 2 (two) times daily. 30 mL 11    blood sugar diagnostic Strp To check BG 1 times daily, to use with insurance preferred meter 100 each 2    blood-glucose meter kit To check BG 1 times daily, to use with insurance preferred meter 1 each 0    diazePAM (VALIUM) 5 MG tablet Take 1 tablet (5 mg total) by mouth daily as needed for Anxiety. 30 tablet 0    diclofenac sodium (VOLTAREN) 1 % Gel Apply 2 g topically 4 (four) times daily. for 10 days 100 g 2    diphenoxylate-atropine 2.5-0.025 mg (LOMOTIL) 2.5-0.025 mg per tablet Take 1 tablet by mouth 4 (four) times daily as needed for Diarrhea. 120 tablet 3    EScitalopram oxalate (LEXAPRO) 20 MG tablet Take 1 tablet (20 mg total) by mouth every evening. 90 tablet 3    fluticasone propionate (FLONASE) 50 mcg/actuation nasal spray 2 sprays (100 mcg total) by Each Nostril route once daily. 9.9  mL 3    lancets Misc To check BG 1 times daily, to use with insurance preferred meter 100 each 2    levothyroxine (SYNTHROID) 88 MCG tablet Take 1 tablet (88 mcg total) by mouth once daily. 90 tablet 3    losartan-hydrochlorothiazide 100-12.5 mg (HYZAAR) 100-12.5 mg Tab Take 1 tablet by mouth once daily. 90 tablet 3    meloxicam (MOBIC) 7.5 MG tablet Take 1 tablet (7.5 mg total) by mouth once daily. 90 tablet 1    methylPREDNISolone (MEDROL DOSEPACK) 4 mg tablet use as directed 21 each 0    multivit-min-iron-FA-lutein 8 mg iron-400 mcg-300 mcg Tab Take 1 tablet by mouth once daily.      neomycin-polymyxin-hydrocortisone (CORTISPORIN) 3.5-10,000-1 mg/mL-unit/mL-% otic suspension Place 3 drops into the right ear 4 (four) times daily. 10 mL 5    omega-3 fatty acids/fish oil (FISH OIL-OMEGA-3 FATTY ACIDS) 300-1,000 mg capsule Take 1 capsule by mouth once daily.      phenazopyridine (PYRIDIUM) 200 MG tablet TAKE ONE TABLET BY MOUTH THREE TIMES A DAY AS NEEDED FOR BLADDER PAIN 30 tablet 2    pitavastatin calcium (LIVALO) 1 mg Tab tablet Take 1 tablet (1 mg total) by mouth every evening. 90 tablet 3     Current Facility-Administered Medications   Medication Dose Route Frequency Provider Last Rate Last Admin    ketorolac injection 30 mg  30 mg Intramuscular 1 time in Clinic/HOD        [2]   Social History  Socioeconomic History    Marital status:    Occupational History    Occupation: retired teller   Tobacco Use    Smoking status: Never    Smokeless tobacco: Never   Substance and Sexual Activity    Alcohol use: Yes     Alcohol/week: 1.0 standard drink of alcohol     Types: 1 Glasses of wine per week     Comment: Rarely    Drug use: Never    Sexual activity: Not Currently     Comment:    Social History Narrative    She exercises regularly.

## 2025-03-28 ENCOUNTER — RESULTS FOLLOW-UP (OUTPATIENT)
Dept: FAMILY MEDICINE | Facility: CLINIC | Age: 80
End: 2025-03-28

## 2025-04-02 ENCOUNTER — OFFICE VISIT (OUTPATIENT)
Dept: FAMILY MEDICINE | Facility: CLINIC | Age: 80
End: 2025-04-02
Payer: MEDICARE

## 2025-04-02 VITALS
DIASTOLIC BLOOD PRESSURE: 74 MMHG | HEIGHT: 65 IN | WEIGHT: 153.56 LBS | HEART RATE: 69 BPM | SYSTOLIC BLOOD PRESSURE: 152 MMHG | BODY MASS INDEX: 25.58 KG/M2 | TEMPERATURE: 98 F | OXYGEN SATURATION: 98 %

## 2025-04-02 DIAGNOSIS — F41.1 GENERALIZED ANXIETY DISORDER: ICD-10-CM

## 2025-04-02 DIAGNOSIS — R73.01 IMPAIRED FASTING GLUCOSE: ICD-10-CM

## 2025-04-02 DIAGNOSIS — E03.9 ACQUIRED HYPOTHYROIDISM: ICD-10-CM

## 2025-04-02 DIAGNOSIS — I65.23 BILATERAL CAROTID ARTERY STENOSIS: ICD-10-CM

## 2025-04-02 DIAGNOSIS — K58.0 IRRITABLE BOWEL SYNDROME WITH DIARRHEA: ICD-10-CM

## 2025-04-02 DIAGNOSIS — I10 PRIMARY HYPERTENSION: ICD-10-CM

## 2025-04-02 DIAGNOSIS — G44.229 CHRONIC TENSION-TYPE HEADACHE, NOT INTRACTABLE: Primary | ICD-10-CM

## 2025-04-02 DIAGNOSIS — E78.2 MIXED HYPERLIPIDEMIA: ICD-10-CM

## 2025-04-02 DIAGNOSIS — I70.0 AORTIC ATHEROSCLEROSIS: ICD-10-CM

## 2025-04-02 DIAGNOSIS — M85.89 OSTEOPENIA OF MULTIPLE SITES: ICD-10-CM

## 2025-04-02 PROCEDURE — 99999 PR PBB SHADOW E&M-EST. PATIENT-LVL IV: CPT | Mod: PBBFAC,,, | Performed by: STUDENT IN AN ORGANIZED HEALTH CARE EDUCATION/TRAINING PROGRAM

## 2025-04-02 PROCEDURE — G2211 COMPLEX E/M VISIT ADD ON: HCPCS | Mod: S$PBB,,, | Performed by: STUDENT IN AN ORGANIZED HEALTH CARE EDUCATION/TRAINING PROGRAM

## 2025-04-02 PROCEDURE — 99215 OFFICE O/P EST HI 40 MIN: CPT | Mod: S$PBB,,, | Performed by: STUDENT IN AN ORGANIZED HEALTH CARE EDUCATION/TRAINING PROGRAM

## 2025-04-02 PROCEDURE — 99214 OFFICE O/P EST MOD 30 MIN: CPT | Mod: PBBFAC,PN | Performed by: STUDENT IN AN ORGANIZED HEALTH CARE EDUCATION/TRAINING PROGRAM

## 2025-04-02 RX ORDER — VALSARTAN AND HYDROCHLOROTHIAZIDE 320; 12.5 MG/1; MG/1
1 TABLET, FILM COATED ORAL DAILY
Qty: 90 TABLET | Refills: 3 | Status: SHIPPED | OUTPATIENT
Start: 2025-04-02 | End: 2026-04-02

## 2025-04-02 RX ORDER — ESCITALOPRAM OXALATE 20 MG/1
20 TABLET ORAL NIGHTLY
Qty: 90 TABLET | Refills: 3 | Status: SHIPPED | OUTPATIENT
Start: 2025-04-02

## 2025-04-03 NOTE — PROGRESS NOTES
Subjective:       Patient ID: Elizabeth Gleason is a 79 y.o. female.    Chief Complaint: Follow-up (Pt here for a 6 month follow up )      Review of Systems   All other systems reviewed and are negative.       A1C:  Recent Labs   Lab 03/18/24 0914 09/30/24 0854 03/28/25 0922   Hemoglobin A1C 5.5 5.4  --    Hemoglobin A1c  --   --  5.5     CBC:  Recent Labs   Lab 03/18/24 0914 09/30/24 0854 03/28/25 0922   WBC 7.92 7.95 8.01   RBC 4.17 4.35 4.15   Hemoglobin 12.5 13.1  --    HGB  --   --  12.6   Hematocrit 37.8 39.6  --    HCT  --   --  37.9   Platelet Count  --   --  377   Platelets 360 359  --    MCV 91 91 91   MCH 30.0 30.1 30.4   MCHC 33.1 33.1 33.2     CMP:  Recent Labs   Lab 03/18/24 0914 09/30/24 0854 03/28/25 0922   Glucose 113 H 121 H  --    Calcium 9.1 10.1 9.2   Albumin 4.2 4.1 3.8   Total Protein 7.4 7.7  --    Sodium 146 H 146 H 142   Potassium 4.2 4.4 3.8   CO2 30 H 28 25   Chloride 107 106 107   BUN 19 H 16 15   Creatinine 0.71 0.8 0.7   Alkaline Phosphatase 76 84  --    ALP  --   --  80   ALT 17 14 13   AST 26 18 17   Total Bilirubin 0.5 0.6  --    Bilirubin Total  --   --  0.4     LIPIDS:  Recent Labs   Lab 03/18/24 0914 09/30/24 0854 03/28/25 0922   TSH 2.290 2.156 1.367   HDL 41 42  --    HDL Cholesterol  --   --  41   Cholesterol Total  --   --  209 H   Cholesterol 224 H 220 H  --    Triglycerides 275 H 284 H  --    Triglyceride  --   --  222 H   LDL Cholesterol 128.0 121.2  --    HDL/Cholesterol Ratio 18.3 L 19.1 L 19.6 L   Non-HDL Cholesterol 183 178  --    Non HDL Cholesterol  --   --  168   Total Cholesterol/HDL Ratio 5.5 H 5.2 H  --    Cholesterol/HDL Ratio  --   --  5.1 H     TSH:  Recent Labs   Lab 03/18/24 0914 09/30/24  0854 03/28/25  0922   TSH 2.290 2.156 1.367        Objective:      Vitals:    04/02/25 1413   BP: (!) 152/74   Pulse: 69   Temp: 98.1 °F (36.7 °C)      Physical Exam  Vitals reviewed.   Constitutional:       Appearance: Normal appearance. She is normal  weight.   HENT:      Head: Normocephalic and atraumatic.   Eyes:      Conjunctiva/sclera: Conjunctivae normal.   Cardiovascular:      Rate and Rhythm: Normal rate and regular rhythm.      Heart sounds: Normal heart sounds.   Pulmonary:      Effort: Pulmonary effort is normal.      Breath sounds: Normal breath sounds.   Abdominal:      Palpations: Abdomen is soft.      Tenderness: There is no abdominal tenderness.   Musculoskeletal:         General: Normal range of motion.      Cervical back: Normal range of motion.      Right lower leg: No edema.      Left lower leg: No edema.   Neurological:      Mental Status: She is alert. Mental status is at baseline.   Psychiatric:         Mood and Affect: Mood normal.         Behavior: Behavior normal.          Assessment:       1. Chronic tension-type headache, not intractable    2. Generalized anxiety disorder    3. Primary hypertension    4. Mixed hyperlipidemia    5. Bilateral carotid artery stenosis    6. Aortic atherosclerosis    7. Impaired fasting glucose    8. Acquired hypothyroidism    9. Irritable bowel syndrome with diarrhea    10. Osteopenia of multiple sites        Plan:     Problem List Items Addressed This Visit          Neuro    Tension type headache - Primary    Overview   lexapro 20   This has helped a lot   Not really having issues any longer            Psychiatric    Anxiety disorder    Overview   lexapro 20   Valium PRN; less than weekly  1 RX lasts minimum 6 months;  review confirms              Relevant Medications    EScitalopram oxalate (LEXAPRO) 20 MG tablet       Cardiac/Vascular    Primary hypertension    Overview   elevated  Losartan/HCTZ 100/12.5  Asymptomatic   Switch to valsartan/HCTZ   Continue to monitor pressures at home            Mixed hyperlipidemia    Overview   Controlled  pitavastatin 1mg QHS   Tolerates   Has hx of statin intolerance in past; follows with cards   Had been having some muscle pains so was holding med          Bilateral carotid artery stenosis    Overview   Follows with cards  Statin and asa         Aortic atherosclerosis    Overview   Asa and statin             Endocrine    Impaired fasting glucose    Overview   Diet controlled  elevated fasting glucose          Acquired hypothyroidism    Overview   Synthroid 88  Asymptomatic   TSH WNL            GI    Spastic colon    Overview   Lomotil PRN   Follows with ANTO[facility]ETTE; Yuriy; annually             Orthopedic    Osteopenia    Overview   Ca/Vit D   strength training           Labs reviewed in detail  Problem list reviewed in detail   Continue healthy lifestyle efforts  Continue current meds as prescribed otherwise; refills per request  Keep routine specialist f/u   RTC in 6 months  with labs prior and/or PRN          Sarah A. Champagne [facility]   4/2/25     I spent a total of 41 minutes on the day of the visit.This includes face to face time and non-face to face time preparing to see the patient (eg, review of tests), obtaining and/or reviewing separately obtained history, documenting clinical information in the electronic or other health record, independently interpreting results and communicating results to the patient/family/caregiver, or care coordinator.

## 2025-04-07 ENCOUNTER — OFFICE VISIT (OUTPATIENT)
Dept: OPTOMETRY | Facility: CLINIC | Age: 80
End: 2025-04-07
Payer: MEDICARE

## 2025-04-07 DIAGNOSIS — H25.13 SENILE NUCLEAR CATARACT, BILATERAL: Primary | ICD-10-CM

## 2025-04-07 DIAGNOSIS — H52.4 MYOPIA OF BOTH EYES WITH REGULAR ASTIGMATISM AND PRESBYOPIA: ICD-10-CM

## 2025-04-07 DIAGNOSIS — H52.223 MYOPIA OF BOTH EYES WITH REGULAR ASTIGMATISM AND PRESBYOPIA: ICD-10-CM

## 2025-04-07 DIAGNOSIS — H52.13 MYOPIA OF BOTH EYES WITH REGULAR ASTIGMATISM AND PRESBYOPIA: ICD-10-CM

## 2025-04-07 PROCEDURE — 92015 DETERMINE REFRACTIVE STATE: CPT | Mod: ,,, | Performed by: OPTOMETRIST

## 2025-04-07 PROCEDURE — 99213 OFFICE O/P EST LOW 20 MIN: CPT | Mod: PBBFAC,PN | Performed by: OPTOMETRIST

## 2025-04-07 PROCEDURE — 99204 OFFICE O/P NEW MOD 45 MIN: CPT | Mod: S$PBB,,, | Performed by: OPTOMETRIST

## 2025-04-07 PROCEDURE — 99999 PR PBB SHADOW E&M-EST. PATIENT-LVL III: CPT | Mod: PBBFAC,,, | Performed by: OPTOMETRIST

## 2025-04-07 NOTE — PROGRESS NOTES
HPI    MALENA: 1 year  Chief complaint (CC): 80 yo F presents today for comprehensive eye exam.   Pt reports  Glasses? -  Contacts? -  H/o eye surgery, injections or laser: -  H/o eye injury: -  Known eye conditions? -  Family h/o eye conditions? -  Eye gtts? -      (-) Flashes (+) Floaters, occasionally (-) Mucous   (+) Tearing, mainly in the mornings when she wake up OU (-) Itching (-)   Burning   (-) Headaches (-) Eye Pain/discomfort (-) Irritation   (-)  Redness (-) Double vision (-) Blurry vision    Diabetic? -  A1c? -     Last edited by Helena Dougherty MA on 4/7/2025  1:13 PM.            Assessment /Plan     For exam results, see Encounter Report.    Senile nuclear cataract, bilateral    Myopia of both eyes with regular astigmatism and presbyopia      MONITOR. ED PT ON ALL EXAM FINDINGS  RX FINAL SPECS   MILD NS OU; UV PROTECTION; MONITOR; DISCUSSED   RTC 1 YR//PRN FOR REE/DFE

## 2025-04-08 ENCOUNTER — TELEPHONE (OUTPATIENT)
Dept: PODIATRY | Facility: CLINIC | Age: 80
End: 2025-04-08
Payer: MEDICARE

## 2025-04-08 NOTE — TELEPHONE ENCOUNTER
Called and spoke to patient. Informed she needs to try PT before taking any other course of action. Patient understood and will schedule appt with us if symptoms do not improve following PT.

## 2025-04-16 PROBLEM — M25.672 DECREASED RANGE OF MOTION OF LEFT ANKLE: Status: ACTIVE | Noted: 2025-04-16

## 2025-04-16 PROBLEM — Z74.09 IMPAIRED FUNCTIONAL MOBILITY AND ACTIVITY TOLERANCE: Status: ACTIVE | Noted: 2025-04-16

## 2025-06-17 ENCOUNTER — TELEPHONE (OUTPATIENT)
Dept: FAMILY MEDICINE | Facility: CLINIC | Age: 80
End: 2025-06-17
Payer: MEDICARE

## 2025-06-17 DIAGNOSIS — K52.9 CHRONIC DIARRHEA: ICD-10-CM

## 2025-06-17 RX ORDER — DIPHENOXYLATE HYDROCHLORIDE AND ATROPINE SULFATE 2.5; .025 MG/1; MG/1
1 TABLET ORAL 4 TIMES DAILY PRN
Qty: 360 TABLET | Refills: 0 | Status: SHIPPED | OUTPATIENT
Start: 2025-06-17

## 2025-06-19 ENCOUNTER — OFFICE VISIT (OUTPATIENT)
Dept: URGENT CARE | Facility: CLINIC | Age: 80
End: 2025-06-19
Payer: MEDICARE

## 2025-06-19 VITALS
WEIGHT: 152 LBS | OXYGEN SATURATION: 98 % | TEMPERATURE: 98 F | HEIGHT: 65 IN | DIASTOLIC BLOOD PRESSURE: 74 MMHG | RESPIRATION RATE: 16 BRPM | HEART RATE: 73 BPM | BODY MASS INDEX: 25.33 KG/M2 | SYSTOLIC BLOOD PRESSURE: 160 MMHG

## 2025-06-19 DIAGNOSIS — R03.0 ELEVATED BLOOD PRESSURE READING: ICD-10-CM
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
## 2025-06-19 NOTE — PATIENT INSTRUCTIONS
Your wound is healing nicely it does not appear to be infected no indication for further antibiotics.  Your bruise we will absorb naturally by the body sometimes this can take several weeks to completely resolve.  You can rotate ice and heat.  Ice for 15 minutes at a time 4 to 5 times a day this can help with swelling.  Heat no longer than 20 minutes this can help the bruise reabsorb.  Tylenol with food as needed for pain relief.  As we discussed do not take Advil or ibuprofen with your meloxicam medication as they interact with each other.  Continue washing wound with soap and water such as antibacterial dial soap.  Do not use any topical alcohol, hydrogen peroxide or Neosporin.  Your blood pressure slightly elevated today continue following with primary care and cardiologist for this and if it remains elevated at home notify them.  Otherwise follow up with urgent care as needed

## 2025-06-19 NOTE — PROGRESS NOTES
"Subjective:      Patient ID: Elizabeth Gleason is a 79 y.o. female.    Vitals:  height is 5' 5" (1.651 m) and weight is 68.9 kg (152 lb). Her oral temperature is 98.2 °F (36.8 °C). Her blood pressure is 160/74 (abnormal) and her pulse is 73. Her respiration is 16 and oxygen saturation is 98%.     Chief Complaint: Wound Check    79 y.o. female who presents today for a wound check follow-up. On 6/10, she was bitten by a dog. She presented to the ER and was treated with Augmentin. Reports finishing full course of antibiotics. Reports mild tenderness. Has been taking Tylenol and Advil. Denies fever/chills and wound bleeding, discharge, and warmth.  Patient states that her blood pressure gets elevated appointment she is following with primary care and Cardiology for her blood pressure.  She checks it at home and has been running in the 140s top number.    Wound Check  She was originally treated 5 to 10 days ago. Previous treatment included wound cleansing or irrigation. There has been no drainage from the wound. There is no redness present. There is no swelling present. The pain has not changed. She has no difficulty moving the affected extremity or digit.       Constitution: Negative for chills and fever.   Cardiovascular:  Negative for chest pain.   Respiratory:  Negative for shortness of breath.    Gastrointestinal:  Negative for nausea and diarrhea.   Musculoskeletal:  Negative for muscle cramps and muscle ache.   Skin:  Positive for puncture wound and bruising. Negative for rash and erythema.   Neurological:  Negative for numbness.      Past Medical History:   Diagnosis Date    ALLERGIC RHINITIS     Chronic diarrhea     GERD (gastroesophageal reflux disease)     HEARING LOSS     Hypertension     Impaired fasting glucose     Osteopenia     Tuberculosis     childhood TB       Past Surgical History:   Procedure Laterality Date    COLONOSCOPY N/A 8/25/2017    Procedure: COLONOSCOPY;  Surgeon: Keny Lui MD;  " [facility]  Service: Endoscopy;  Laterality: N/A;    left 5th toe surgery      rt ear surgery      rt lung lobectomy and a rib removed      secondary to TB    TUBAL LIGATION         Family History   Problem Relation Name Age of Onset    Heart disease Mother          cabg    Kidney disease Mother          was on dialysis    Stroke Father      Cancer Brother          lung cancer    No Known Problems Son      Breast cancer Cousin Pat first cousin         paternal FIRST cousin    Ovarian cancer Neg Hx      Esophageal cancer Neg Hx      Colon cancer Neg Hx         Social History     Socioeconomic History    Marital status:    Occupational History    Occupation: retired teller   Tobacco Use    Smoking status: Never    Smokeless tobacco: Never   Substance and Sexual Activity    Alcohol use: Yes     Alcohol/week: 1.0 standard drink of alcohol     Types: 1 Glasses of wine per week     Comment: Rarely    Drug use: Never    Sexual activity: Not Currently     Comment:    Social History Narrative    She exercises regularly.       Current Medications[1]    Review of patient's allergies indicates:   Allergen Reactions    Bempedoic acid      Patient developed myopathy    Levaquin [levofloxacin]      Heart raced and felt faint    Sulfa (sulfonamide antibiotics) Nausea And Vomiting    Tramadol Nausea And Vomiting       Objective:     Physical Exam   Constitutional:  Non-toxic appearance. She does not appear ill. No distress.   Cardiovascular: Normal rate, regular rhythm, normal heart sounds and normal pulses.   No murmur heard.Exam reveals no gallop and no friction rub.   Pulmonary/Chest: Effort normal and breath sounds normal. No stridor. No respiratory distress. She has no wheezes. She has no rhonchi. She has no rales.   Abdominal: Normal appearance.   Neurological: no focal deficit. She is alert.   Skin: Skin is warm, dry and not diaphoretic. bruising No erythema        Psychiatric: Her behavior  is normal. Mood, judgment and thought content normal.   Nursing note and vitals reviewed.      Assessment:     1. Dog bite, subsequent encounter    2. Elevated blood pressure reading        Plan:       Dog bite, subsequent encounter    Elevated blood pressure reading           I have reviewed the patient chart and pertinent past imaging/labs.  Midwife student ayla michael    Patient has a pretty good understanding about her blood pressure she states he has been running in the 140s at home.  She had a primary care appointment in April of 2025 she has been taking her medications.  Also has a follow up with Cardiology coming up.  Advised that if refill remains high to reach out to her primary care she has good understanding  Patient Instructions   Your wound is healing nicely it does not appear to be infected no indication for further antibiotics.  Your bruise we will absorb naturally by the body sometimes this can take several weeks to completely resolve.  You can rotate ice and heat.  Ice for 15 minutes at a time 4 to 5 times a day this can help with swelling.  Heat no longer than 20 minutes this can help the bruise reabsorb.  Tylenol with food as needed for pain relief.  As we discussed do not take Advil or ibuprofen with your meloxicam medication as they interact with each other.  Continue washing wound with soap and water such as antibacterial dial soap.  Do not use any topical alcohol, hydrogen peroxide or Neosporin.  Your blood pressure slightly elevated today continue following with primary care and cardiologist for this and if it remains elevated at home notify them.  Otherwise follow up with urgent care as needed           [1]   Current Outpatient Medications   Medication Sig Dispense Refill    amoxicillin-clavulanate 875-125mg (AUGMENTIN) 875-125 mg per tablet Take 1 tablet by mouth 2 (two) times daily. 14 tablet 0    aspirin (ECOTRIN) 81 MG EC tablet Take 81 mg by mouth once daily.      blood sugar  diagnostic Strp To check BG 1 times daily, to use with insurance preferred meter 100 each 2    diazePAM (VALIUM) 5 MG tablet Take 1 tablet (5 mg total) by mouth daily as needed for Anxiety. 30 tablet 0    diphenoxylate-atropine 2.5-0.025 mg (LOMOTIL) 2.5-0.025 mg per tablet Take 1 tablet by mouth 4 (four) times daily as needed for Diarrhea. 360 tablet 0    EScitalopram oxalate (LEXAPRO) 20 MG tablet Take 1 tablet (20 mg total) by mouth every evening. 90 tablet 3    fluticasone propionate (FLONASE) 50 mcg/actuation nasal spray 2 sprays (100 mcg total) by Each Nostril route once daily. 9.9 mL 3    lancets Misc To check BG 1 times daily, to use with insurance preferred meter 100 each 2    levothyroxine (SYNTHROID) 88 MCG tablet Take 1 tablet (88 mcg total) by mouth once daily. 90 tablet 3    meloxicam (MOBIC) 7.5 MG tablet Take 1 tablet (7.5 mg total) by mouth once daily. 90 tablet 1    methylPREDNISolone (MEDROL DOSEPACK) 4 mg tablet use as directed 21 each 0    multivit-min-iron-FA-lutein 8 mg iron-400 mcg-300 mcg Tab Take 1 tablet by mouth once daily.      neomycin-polymyxin-hydrocortisone (CORTISPORIN) 3.5-10,000-1 mg/mL-unit/mL-% otic suspension Place 3 drops into the right ear 4 (four) times daily. 10 mL 5    omega-3 fatty acids/fish oil (FISH OIL-OMEGA-3 FATTY ACIDS) 300-1,000 mg capsule Take 1 capsule by mouth once daily.      phenazopyridine (PYRIDIUM) 200 MG tablet TAKE ONE TABLET BY MOUTH THREE TIMES A DAY AS NEEDED FOR BLADDER PAIN 30 tablet 2    pitavastatin calcium (LIVALO) 1 mg Tab tablet Take 1 tablet (1 mg total) by mouth every evening. 90 tablet 3    valsartan-hydrochlorothiazide (DIOVAN-HCT) 320-12.5 mg per tablet Take 1 tablet by mouth once daily. 90 tablet 3    azelastine (ASTELIN) 137 mcg (0.1 %) nasal spray 1 spray (137 mcg total) by Nasal route 2 (two) times daily. 30 mL 11    blood-glucose meter kit To check BG 1 times daily, to use with insurance preferred meter 1 each 0    diclofenac sodium  (VOLTAREN) 1 % Gel Apply 2 g topically 4 (four) times daily. for 10 days 100 g 2     Current Facility-Administered Medications   Medication Dose Route Frequency Provider Last Rate Last Admin    ketorolac injection 30 mg  30 mg Intramuscular 1 time in Clinic/HOD

## 2025-06-19 NOTE — PROGRESS NOTES
"Subjective:      Patient ID: Elizabeth Gleason is a 79 y.o. female.    Vitals:  height is 5' 5" (1.651 m) and weight is 68.9 kg (152 lb). Her oral temperature is 98.2 °F (36.8 °C). Her blood pressure is 160/74 (abnormal) and her pulse is 73. Her respiration is 16 and oxygen saturation is 98%.     Chief Complaint: Wound Check    This is a 79 y.o. female who presents today with a chief complaint of dog bite  Patient presents with:  Wound Check from a dog bite on the 6/10. Pt was originally seen on 6/10 in the ED for the initial dog bite. Pt states she had no stitches. Pt states the area is tender when she sits. Pt said she was given antibiotics and stopped taking the medicine on Tuesday.         Wound Check  She was originally treated 5 to 10 days ago. Previous treatment included wound cleansing or irrigation. There has been no drainage from the wound. There is no redness present. There is no swelling present. The pain has not changed. She has no difficulty moving the affected extremity or digit.   ROS   Objective:     Physical Exam    Assessment:     No diagnosis found.    Plan:       There are no diagnoses linked to this encounter.       I have reviewed the patient chart and pertinent past imaging/labs.  Midwife student ayla rodriguez          "

## 2025-07-02 DIAGNOSIS — M54.9 DORSALGIA: ICD-10-CM

## 2025-07-02 DIAGNOSIS — F41.1 GENERALIZED ANXIETY DISORDER: ICD-10-CM

## 2025-07-02 RX ORDER — MELOXICAM 7.5 MG/1
7.5 TABLET ORAL DAILY
Qty: 90 TABLET | Refills: 1 | Status: SHIPPED | OUTPATIENT
Start: 2025-07-02

## 2025-07-02 RX ORDER — DIAZEPAM 5 MG/1
5 TABLET ORAL DAILY PRN
Qty: 30 TABLET | Refills: 0 | Status: SHIPPED | OUTPATIENT
Start: 2025-07-02

## 2025-07-02 NOTE — TELEPHONE ENCOUNTER
No care due was identified.  [facility] Embedded Care Due Messages. Reference number: [id number].   7/02/2025 2:53:13 PM CDT

## 2025-07-02 NOTE — TELEPHONE ENCOUNTER
Copied from CRM #[id number]. Topic: Medications - Medication Refill  >> Jul 2, 2025 11:54 AM Clare wrote:  Call the clinic reply in [facility]: Y       Please refill the medication(s) listed below. Please call the patient when the prescription(s) is ready for  at this phone number .[phone]    Medication #1:diazePAM (VALIUM) 5 MG tablet        Medication #2:meloxicam (MOBIC) 7.5 MG tablet    Preferred Pharmacy.  [facility] [address]  [address] [address]  Phone: [phone] Fax: [phone]

## 2025-07-03 ENCOUNTER — OFFICE VISIT (OUTPATIENT)
Dept: CARDIOLOGY | Facility: CLINIC | Age: 80
End: 2025-07-03
Payer: MEDICARE

## 2025-07-03 VITALS
HEIGHT: 65 IN | OXYGEN SATURATION: 98 % | DIASTOLIC BLOOD PRESSURE: 69 MMHG | BODY MASS INDEX: 25.23 KG/M2 | SYSTOLIC BLOOD PRESSURE: 149 MMHG | WEIGHT: 151.44 LBS | HEART RATE: 60 BPM

## 2025-07-03 DIAGNOSIS — Z82.49 FAMILY HISTORY OF PREMATURE CAD: ICD-10-CM

## 2025-07-03 DIAGNOSIS — R07.9 CHEST PAIN, UNSPECIFIED TYPE: Primary | ICD-10-CM

## 2025-07-03 DIAGNOSIS — E78.2 MIXED HYPERLIPIDEMIA: ICD-10-CM

## 2025-07-03 DIAGNOSIS — E78.5 HYPERLIPIDEMIA, UNSPECIFIED HYPERLIPIDEMIA TYPE: ICD-10-CM

## 2025-07-03 LAB
OHS QRS DURATION: 84 MS
OHS QTC CALCULATION: 444 MS

## 2025-07-03 PROCEDURE — 99999 PR PBB SHADOW E&M-EST. PATIENT-LVL IV: CPT | Mod: PBBFAC,,, | Performed by: INTERNAL MEDICINE

## 2025-07-03 PROCEDURE — 93010 ELECTROCARDIOGRAM REPORT: CPT | Mod: S$PBB,,, | Performed by: INTERNAL MEDICINE

## 2025-07-03 PROCEDURE — 93005 ELECTROCARDIOGRAM TRACING: CPT | Mod: PBBFAC,PN | Performed by: INTERNAL MEDICINE

## 2025-07-03 PROCEDURE — 99214 OFFICE O/P EST MOD 30 MIN: CPT | Mod: PBBFAC,PN | Performed by: INTERNAL MEDICINE

## 2025-07-03 RX ORDER — PITAVASTATIN CALCIUM 1.04 MG/1
1 TABLET, FILM COATED ORAL NIGHTLY
Qty: 90 TABLET | Refills: 3 | Status: SHIPPED | OUTPATIENT
Start: 2025-07-03 | End: 2026-07-03

## 2025-07-03 RX ORDER — NAPROXEN SODIUM 220 MG/1
81 TABLET, FILM COATED ORAL DAILY
COMMUNITY

## 2025-07-03 RX ORDER — EZETIMIBE 10 MG/1
10 TABLET ORAL DAILY
Qty: 90 TABLET | Refills: 3 | Status: SHIPPED | OUTPATIENT
Start: 2025-07-03 | End: 2026-07-03

## 2025-07-03 NOTE — PROGRESS NOTES
Subjective:   @Patient ID:  Elizabeth Gleason is a 79 y.o. female who presents for evaluation of No chief complaint on file.      HPI:         79-year-old female here to establish care with a cardiologist with concerns of premature CAD in family members.      Active medical problems include      - premature CAD history and family member son having MI at age 58 father had stroke at age 63  - on aspirin therapy  - childhood tuberculosis that is now treated  - hyperlipidemia  triglycerides 222 on pravastatin, side effects at higher dose and with Lipitor, Crestor.  Starting Zetia today  - minimal carotid atherosclerosis  - A1c 5.5  - echocardiogram in 2023 with 60% EF RVSP 25 normal diastolic function         Good exercise capacity no pathological murmurs.  Euvolemic on examination.  EKG with normal    Results for orders placed during the hospital encounter of 09/25/23    Echo Saline Bubble? No    Interpretation Summary    Left Ventricle: The left ventricle is normal in size. Normal wall thickness. Normal wall motion. There is normal systolic function. Ejection fraction by visual approximation is 60%. There is normal diastolic function.    Right Ventricle: Systolic function is normal.    Pulmonary Artery: The estimated pulmonary artery systolic pressure is 25 mmHg.    IVC/SVC: Normal venous pressure at 3 mmHg.      No results found for this or any previous visit.      No results found for this or any previous visit.      Please document below the medical necessity for continuous telemetry monitoring or discontinue the current order if appropriate.    Current rhythm from flowsheet:               Patient Active Problem List    Diagnosis Date Noted    Decreased range of motion of left ankle 04/16/2025    Impaired functional mobility and activity tolerance 04/16/2025    Spastic colon 09/08/2022     Lomotil PRN   Follows with ANTO[facility]TE; Yuriy; annually       Aortic atherosclerosis 01/10/2020     Asa and statin        Mixed hyperlipidemia 03/01/2018     Controlled  pitavastatin 1mg QHS   Tolerates   Has hx of statin intolerance in past; follows with cards   Had been having some muscle pains so was holding med      Anxiety disorder 03/01/2018     lexapro 20   Valium PRN; less than weekly  1 RX lasts minimum 6 months;  review confirms           Acquired hypothyroidism 11/16/2017     Synthroid 88  Asymptomatic   TSH WNL      Tension type headache 06/10/2016     lexapro 20   This has helped a lot   Not really having issues any longer      Primary hypertension      elevated  Losartan/HCTZ 100/12.5  Asymptomatic   Switch to valsartan/HCTZ   Continue to monitor pressures at home         Impaired fasting glucose      Diet controlled  elevated fasting glucose       Osteopenia      Ca/Vit D   strength training       Bilateral carotid artery stenosis      Follows with cards  Statin and asa                      LAST HbA1c  Lab Results   Component Value Date    HGBA1C 5.5 03/28/2025       Lipid panel  Lab Results   Component Value Date    CHOL 209 (H) 03/28/2025    CHOL 220 (H) 09/30/2024    CHOL 224 (H) 03/18/2024     Lab Results   Component Value Date    HDL 41 03/28/2025    HDL 42 09/30/2024    HDL 41 03/18/2024     Lab Results   Component Value Date    LDLCALC 123.6 03/28/2025    LDLCALC 121.2 09/30/2024    LDLCALC 128.0 03/18/2024     Lab Results   Component Value Date    TRIG 222 (H) 03/28/2025    TRIG 284 (H) 09/30/2024    TRIG 275 (H) 03/18/2024     Lab Results   Component Value Date    CHOLHDL 19.6 (L) 03/28/2025    CHOLHDL 19.1 (L) 09/30/2024    CHOLHDL 18.3 (L) 03/18/2024            Review of Systems   Constitutional: Negative for chills and fever.   HENT:  Negative for hearing loss and nosebleeds.    Eyes:  Negative for blurred vision.   Cardiovascular:         As in HPI    Respiratory:  Negative for cough, hemoptysis and shortness of breath.    Endocrine: Negative for cold intolerance and polyuria.   Hematologic/Lymphatic:  Negative for bleeding problem.   Skin:  Negative for itching.   Musculoskeletal:  Negative for falls.   Gastrointestinal:  Negative for abdominal pain and hematochezia.   Genitourinary:  Negative for hematuria.   Neurological:  Negative for dizziness and loss of balance.   Psychiatric/Behavioral:  Negative for altered mental status and depression.        Objective:   Physical Exam  Constitutional:       Appearance: She is well-developed.   HENT:      Head: Normocephalic and atraumatic.   Eyes:      Conjunctiva/sclera: Conjunctivae normal.   Neck:      Vascular: No carotid bruit or JVD.   Cardiovascular:      Rate and Rhythm: Normal rate and regular rhythm.      Pulses:           Carotid pulses are 2+ on the right side and 2+ on the left side.       Radial pulses are 2+ on the right side and 2+ on the left side.      Heart sounds: Normal heart sounds. No murmur heard.     No friction rub. No gallop.   Pulmonary:      Effort: Pulmonary effort is normal. No respiratory distress.      Breath sounds: Normal breath sounds. No stridor. No wheezing.   Abdominal:      General: Abdomen is flat.      Palpations: Abdomen is soft.   Musculoskeletal:      Cervical back: Neck supple.      Right lower leg: No edema.      Left lower leg: No edema.   Skin:     General: Skin is warm and dry.   Neurological:      Mental Status: She is alert and oriented to person, place, and time.   Psychiatric:         Behavior: Behavior normal.         Assessment:     1. Chest pain, unspecified type    2. Mixed hyperlipidemia    3. Hyperlipidemia, unspecified hyperlipidemia type    4. Family history of premature CAD        Plan:     - discussed  overall heart health with the patient.  Continue aspirin.  -  Goal LDL less than 70 start Zetia.  Continue pitavastatin  - no exertional symptoms.  No pathological murmurs.  Recent echocardiogram reviewed.  -  Follow up on annual basis.  Lipid panel in 6 months with PCP.      Pertinent cardiac images and EKG  reviewed independently.    Continue with current medical plan and lifestyle changes.  Return sooner for concerns or questions. If symptoms persist go to the ED  I have reviewed all pertinent data including patient's medical history in detail and updated the computerized patient record.     (Portions of this note were dictated using voice recognition software and may contain dictation related errors in spelling/grammar/syntax not found on text review)    Orders Placed This Encounter   Procedures    Lipid Panel     Standing Status:   Future     Expected Date:   7/3/2025     Expiration Date:   10/1/2026    IN OFFICE EKG 12-LEAD (to [facility])       Follow up as scheduled.     She expressed verbal understanding and agreed with the plan    Patient's Medications   New Prescriptions    EZETIMIBE (ZETIA) 10 MG TABLET    Take 1 tablet (10 mg total) by mouth once daily.   Previous Medications    AMOXICILLIN-CLAVULANATE 875-125MG (AUGMENTIN) 875-125 MG PER TABLET    Take 1 tablet by mouth 2 (two) times daily.    ASPIRIN (ECOTRIN) 81 MG EC TABLET    Take 81 mg by mouth once daily.    ASPIRIN 81 MG CHEW    Take 81 mg by mouth once daily.    AZELASTINE (ASTELIN) 137 MCG (0.1 %) NASAL SPRAY    1 spray (137 mcg total) by Nasal route 2 (two) times daily.    BLOOD SUGAR DIAGNOSTIC STRP    To check BG 1 times daily, to use with insurance preferred meter    BLOOD-GLUCOSE METER KIT    To check BG 1 times daily, to use with insurance preferred meter    DIAZEPAM (VALIUM) 5 MG TABLET    Take 1 tablet (5 mg total) by mouth daily as needed for Anxiety.    DICLOFENAC SODIUM (VOLTAREN) 1 % GEL    Apply 2 g topically 4 (four) times daily. for 10 days    DIPHENOXYLATE-ATROPINE 2.5-0.025 MG (LOMOTIL) 2.5-0.025 MG PER TABLET    Take 1 tablet by mouth 4 (four) times daily as needed for Diarrhea.    ESCITALOPRAM OXALATE (LEXAPRO) 20 MG TABLET    Take 1 tablet (20 mg total) by mouth every evening.    FLUTICASONE PROPIONATE (FLONASE) 50 MCG/ACTUATION NASAL  SPRAY    2 sprays (100 mcg total) by Each Nostril route once daily.    LANCETS MISC    To check BG 1 times daily, to use with insurance preferred meter    LEVOTHYROXINE (SYNTHROID) 88 MCG TABLET    Take 1 tablet (88 mcg total) by mouth once daily.    MELOXICAM (MOBIC) 7.5 MG TABLET    Take 1 tablet (7.5 mg total) by mouth once daily.    METHYLPREDNISOLONE (MEDROL DOSEPACK) 4 MG TABLET    use as directed    MULTIVIT-MIN-IRON-FA-LUTEIN 8 MG IRON-400 MCG-300 MCG TAB    Take 1 tablet by mouth once daily.    NEOMYCIN-POLYMYXIN-HYDROCORTISONE (CORTISPORIN) 3.5-10,000-1 MG/ML-UNIT/ML-% OTIC SUSPENSION    Place 3 drops into the right ear 4 (four) times daily.    OMEGA-3 FATTY ACIDS/FISH OIL (FISH OIL-OMEGA-3 FATTY ACIDS) 300-1,000 MG CAPSULE    Take 1 capsule by mouth once daily.    PHENAZOPYRIDINE (PYRIDIUM) 200 MG TABLET    TAKE ONE TABLET BY MOUTH THREE TIMES A DAY AS NEEDED FOR BLADDER PAIN    VALSARTAN-HYDROCHLOROTHIAZIDE (DIOVAN-HCT) 320-12.5 MG PER TABLET    Take 1 tablet by mouth once daily.   Modified Medications    Modified Medication Previous Medication    PITAVASTATIN CALCIUM (LIVALO) 1 MG TAB TABLET pitavastatin calcium (LIVALO) 1 mg Tab tablet       Take 1 tablet (1 mg total) by mouth every evening.    Take 1 tablet (1 mg total) by mouth every evening.   Discontinued Medications    No medications on file        Abel Bryant M.D

## 2025-09-05 ENCOUNTER — TELEPHONE (OUTPATIENT)
Dept: FAMILY MEDICINE | Facility: CLINIC | Age: 80
End: 2025-09-05
Payer: MEDICARE

## 2025-09-05 ENCOUNTER — OFFICE VISIT (OUTPATIENT)
Dept: FAMILY MEDICINE | Facility: CLINIC | Age: 80
End: 2025-09-05
Payer: MEDICARE

## 2025-09-05 VITALS
HEART RATE: 78 BPM | DIASTOLIC BLOOD PRESSURE: 72 MMHG | HEIGHT: 65 IN | WEIGHT: 152 LBS | BODY MASS INDEX: 25.33 KG/M2 | SYSTOLIC BLOOD PRESSURE: 132 MMHG | OXYGEN SATURATION: 97 % | TEMPERATURE: 98 F

## 2025-09-05 DIAGNOSIS — M54.41 ACUTE MIDLINE LOW BACK PAIN WITH BILATERAL SCIATICA: Primary | ICD-10-CM

## 2025-09-05 DIAGNOSIS — M54.42 ACUTE MIDLINE LOW BACK PAIN WITH BILATERAL SCIATICA: Primary | ICD-10-CM

## 2025-09-05 PROCEDURE — 99215 OFFICE O/P EST HI 40 MIN: CPT | Mod: PBBFAC,PN | Performed by: STUDENT IN AN ORGANIZED HEALTH CARE EDUCATION/TRAINING PROGRAM

## 2025-09-05 PROCEDURE — 99999 PR PBB SHADOW E&M-EST. PATIENT-LVL V: CPT | Mod: PBBFAC,,, | Performed by: STUDENT IN AN ORGANIZED HEALTH CARE EDUCATION/TRAINING PROGRAM

## 2025-09-05 RX ORDER — TIZANIDINE 4 MG/1
4 TABLET ORAL NIGHTLY PRN
Qty: 60 TABLET | Refills: 1 | Status: SHIPPED | OUTPATIENT
Start: 2025-09-05

## 2025-09-05 RX ORDER — ACETAMINOPHEN 500 MG
1000 TABLET ORAL EVERY 8 HOURS PRN
Qty: 60 TABLET | Refills: 1 | Status: SHIPPED | OUTPATIENT
Start: 2025-09-05

## 2025-09-05 RX ORDER — MELOXICAM 15 MG/1
15 TABLET ORAL DAILY
Qty: 60 TABLET | Refills: 1 | Status: SHIPPED | OUTPATIENT
Start: 2025-09-05